# Patient Record
Sex: FEMALE | Race: BLACK OR AFRICAN AMERICAN | NOT HISPANIC OR LATINO
[De-identification: names, ages, dates, MRNs, and addresses within clinical notes are randomized per-mention and may not be internally consistent; named-entity substitution may affect disease eponyms.]

---

## 2017-01-06 ENCOUNTER — APPOINTMENT (OUTPATIENT)
Dept: GASTROENTEROLOGY | Facility: CLINIC | Age: 57
End: 2017-01-06

## 2017-07-10 ENCOUNTER — APPOINTMENT (OUTPATIENT)
Dept: VASCULAR SURGERY | Facility: CLINIC | Age: 57
End: 2017-07-10

## 2017-07-10 VITALS
HEIGHT: 64 IN | WEIGHT: 281 LBS | DIASTOLIC BLOOD PRESSURE: 87 MMHG | HEART RATE: 75 BPM | TEMPERATURE: 98 F | BODY MASS INDEX: 47.97 KG/M2 | SYSTOLIC BLOOD PRESSURE: 140 MMHG

## 2017-07-10 DIAGNOSIS — I10 ESSENTIAL (PRIMARY) HYPERTENSION: ICD-10-CM

## 2017-07-10 DIAGNOSIS — Z87.891 PERSONAL HISTORY OF NICOTINE DEPENDENCE: ICD-10-CM

## 2017-07-10 DIAGNOSIS — Z87.09 PERSONAL HISTORY OF OTHER DISEASES OF THE RESPIRATORY SYSTEM: ICD-10-CM

## 2017-07-10 DIAGNOSIS — Z86.39 PERSONAL HISTORY OF OTHER ENDOCRINE, NUTRITIONAL AND METABOLIC DISEASE: ICD-10-CM

## 2017-07-10 DIAGNOSIS — I83.93 ASYMPTOMATIC VARICOSE VEINS OF BILATERAL LOWER EXTREMITIES: ICD-10-CM

## 2017-07-10 DIAGNOSIS — Z78.9 OTHER SPECIFIED HEALTH STATUS: ICD-10-CM

## 2017-07-10 RX ORDER — SIMVASTATIN 40 MG/1
40 TABLET, FILM COATED ORAL
Refills: 0 | Status: ACTIVE | COMMUNITY

## 2017-07-10 RX ORDER — MONTELUKAST 10 MG/1
10 TABLET, FILM COATED ORAL
Refills: 0 | Status: ACTIVE | COMMUNITY

## 2017-07-10 RX ORDER — TELMISARTAN AND HYDROCHLOROTHIAZIDE 40; 12.5 MG/1; MG/1
40-12.5 TABLET ORAL
Refills: 0 | Status: ACTIVE | COMMUNITY

## 2017-10-23 ENCOUNTER — APPOINTMENT (OUTPATIENT)
Dept: VASCULAR SURGERY | Facility: CLINIC | Age: 57
End: 2017-10-23
Payer: COMMERCIAL

## 2017-10-23 VITALS
TEMPERATURE: 98 F | DIASTOLIC BLOOD PRESSURE: 84 MMHG | SYSTOLIC BLOOD PRESSURE: 137 MMHG | HEIGHT: 64 IN | BODY MASS INDEX: 47.46 KG/M2 | WEIGHT: 278 LBS

## 2017-10-23 PROCEDURE — 99214 OFFICE O/P EST MOD 30 MIN: CPT

## 2017-11-01 ENCOUNTER — APPOINTMENT (OUTPATIENT)
Dept: VASCULAR SURGERY | Facility: CLINIC | Age: 57
End: 2017-11-01
Payer: COMMERCIAL

## 2017-11-01 PROCEDURE — 99211 OFF/OP EST MAY X REQ PHY/QHP: CPT

## 2017-11-01 PROCEDURE — 93924 LWR XTR VASC STDY BILAT: CPT

## 2018-02-05 ENCOUNTER — APPOINTMENT (OUTPATIENT)
Dept: VASCULAR SURGERY | Facility: CLINIC | Age: 58
End: 2018-02-05

## 2018-03-15 ENCOUNTER — RX RENEWAL (OUTPATIENT)
Age: 58
End: 2018-03-15

## 2018-06-28 ENCOUNTER — EMERGENCY (EMERGENCY)
Facility: HOSPITAL | Age: 58
LOS: 1 days | Discharge: HOME | End: 2018-06-28
Attending: EMERGENCY MEDICINE | Admitting: EMERGENCY MEDICINE
Payer: COMMERCIAL

## 2018-06-28 VITALS
TEMPERATURE: 98 F | SYSTOLIC BLOOD PRESSURE: 144 MMHG | OXYGEN SATURATION: 100 % | HEART RATE: 87 BPM | DIASTOLIC BLOOD PRESSURE: 90 MMHG | RESPIRATION RATE: 16 BRPM

## 2018-06-28 LAB
APPEARANCE UR: CLEAR — SIGNIFICANT CHANGE UP
BILIRUB UR-MCNC: NEGATIVE — SIGNIFICANT CHANGE UP
BLOOD UR QL VISUAL: NEGATIVE — SIGNIFICANT CHANGE UP
COLOR SPEC: YELLOW — SIGNIFICANT CHANGE UP
GLUCOSE UR-MCNC: NEGATIVE — SIGNIFICANT CHANGE UP
KETONES UR-MCNC: NEGATIVE — SIGNIFICANT CHANGE UP
LEUKOCYTE ESTERASE UR-ACNC: HIGH
NITRITE UR-MCNC: NEGATIVE — SIGNIFICANT CHANGE UP
NON-SQ EPI CELLS # UR AUTO: 1 — SIGNIFICANT CHANGE UP
PH UR: 6 — SIGNIFICANT CHANGE UP (ref 4.6–8)
PROT UR-MCNC: NEGATIVE MG/DL — SIGNIFICANT CHANGE UP
RBC CASTS # UR COMP ASSIST: SIGNIFICANT CHANGE UP (ref 0–?)
SP GR SPEC: 1.02 — SIGNIFICANT CHANGE UP (ref 1–1.04)
SQUAMOUS # UR AUTO: SIGNIFICANT CHANGE UP
UROBILINOGEN FLD QL: NORMAL MG/DL — SIGNIFICANT CHANGE UP
WBC UR QL: SIGNIFICANT CHANGE UP (ref 0–?)

## 2018-06-28 PROCEDURE — 99284 EMERGENCY DEPT VISIT MOD MDM: CPT

## 2018-06-28 PROCEDURE — 76830 TRANSVAGINAL US NON-OB: CPT | Mod: 26

## 2018-06-28 RX ORDER — DICLOFENAC SODIUM 75 MG/1
1 TABLET, DELAYED RELEASE ORAL
Qty: 30 | Refills: 0
Start: 2018-06-28 | End: 2018-07-07

## 2018-06-28 RX ORDER — IBUPROFEN 200 MG
600 TABLET ORAL ONCE
Qty: 0 | Refills: 0 | Status: COMPLETED | OUTPATIENT
Start: 2018-06-28 | End: 2018-06-28

## 2018-06-28 RX ADMIN — Medication 600 MILLIGRAM(S): at 16:14

## 2018-06-28 NOTE — ED PROVIDER NOTE - PLAN OF CARE
- Follow up with your doctor/OBGYN within 3-5 days.   - Return to the ED for any new or worsening symptoms.   - Take Tylenol (Acetaminophen) 650mg or Motrin (Ibuprofen/Advil) 600mg every 6 hours as needed for pain.   - Return to the ED for any new or worsening symptoms.

## 2018-06-28 NOTE — ED PROVIDER NOTE - ATTENDING CONTRIBUTION TO CARE
Seen and examined, mild distress, c/o chronic low abd/pelvic pain with recent inc. pain and inc. urinary c/o, +dysuria, no fever/chills, no N/V, no freq. Pt. states has GYN but not helping pelvic pains, denies recent trauma or injury, denies recent illness or inciting event. Clear lungs, heart reg, abd soft, obese, mild low abd tenderness, no ba/guarding, no CVAT.

## 2018-06-28 NOTE — ED PROVIDER NOTE - OBJECTIVE STATEMENT
57 yo morbidly obese female h/o HTN, HLD, DM p/w suprapubic and L pelvic pain unchanged x 2 months, endorses occasional vaginal burning. Increased urination. Went to OB last week, had normal exam and US. Had enlarged ovarian cyst requiring surgery in the past. Denies STDs, vaginal discharge, vaginal bleeding, dysuria, hematuria, nausea, vomiting, flank pain.

## 2018-06-28 NOTE — ED PROVIDER NOTE - CARE PLAN
Principal Discharge DX:	Fibroids  Assessment and plan of treatment:	- Follow up with your doctor/OBGYN within 3-5 days.   - Return to the ED for any new or worsening symptoms.   - Take Tylenol (Acetaminophen) 650mg or Motrin (Ibuprofen/Advil) 600mg every 6 hours as needed for pain.   - Return to the ED for any new or worsening symptoms.

## 2018-06-28 NOTE — ED ADULT TRIAGE NOTE - CHIEF COMPLAINT QUOTE
pt c/o pelvic pain and burning x few months but states pain is getting worse. c/o frequent urination. denies dysuria/fever/chills. pt was seen by OBGYN with negative work up. denies abnormal vaginal discharge/odor.

## 2018-06-30 LAB
BACTERIA UR CULT: SIGNIFICANT CHANGE UP
SPECIMEN SOURCE: SIGNIFICANT CHANGE UP

## 2019-11-04 ENCOUNTER — APPOINTMENT (OUTPATIENT)
Dept: VASCULAR SURGERY | Facility: CLINIC | Age: 59
End: 2019-11-04
Payer: COMMERCIAL

## 2019-11-04 VITALS
WEIGHT: 278 LBS | DIASTOLIC BLOOD PRESSURE: 69 MMHG | BODY MASS INDEX: 47.46 KG/M2 | HEART RATE: 71 BPM | HEIGHT: 64 IN | SYSTOLIC BLOOD PRESSURE: 120 MMHG

## 2019-11-04 PROCEDURE — 99213 OFFICE O/P EST LOW 20 MIN: CPT

## 2019-11-04 PROCEDURE — 93970 EXTREMITY STUDY: CPT

## 2019-11-04 NOTE — HISTORY OF PRESENT ILLNESS
[FreeTextEntry1] : 58 yo female with history of dm, htn and venous insufficency with edema and pain bilaterally presents for follow up with recurrent lower extremity tenderness.  pt states that the pain is mainly at night.  pt denies any history of claudications.  pt states that she occasionally wears the compression stockings but sees no difference in her symptoms.  pt unaware of any provoking symptoms.  \par pt states that the lower extremities burn and are tender to touch

## 2019-11-04 NOTE — ASSESSMENT
[FreeTextEntry1] : 58 yo female with history of dm, htn and venous insufficency with edema and pain bilaterally presents for follow up with recurrent lower extremity tenderness\par venous duplex shows no evidence of dvt or venous insufficency \par at this time no intervention at this time \par pt to follow up as needed

## 2019-11-04 NOTE — PHYSICAL EXAM
[2+] : left 2+ [No Rash or Lesion] : No rash or lesion [Calm] : calm [JVD] : no jugular venous distention  [Normal Breath Sounds] : Normal breath sounds [Normal Heart Sounds] : normal heart sounds [Ankle Swelling (On Exam)] : not present [Varicose Veins Of Lower Extremities] : not present [] : not present [Abdomen Masses] : No abdominal masses [Skin Ulcer] : no ulcer [Oriented to Person] : oriented to person [Oriented to Place] : oriented to place [de-identified] : appears well

## 2020-02-18 ENCOUNTER — APPOINTMENT (OUTPATIENT)
Dept: GASTROENTEROLOGY | Facility: CLINIC | Age: 60
End: 2020-02-18
Payer: COMMERCIAL

## 2020-02-18 VITALS
HEIGHT: 64 IN | SYSTOLIC BLOOD PRESSURE: 111 MMHG | HEART RATE: 60 BPM | DIASTOLIC BLOOD PRESSURE: 66 MMHG | WEIGHT: 274 LBS | BODY MASS INDEX: 46.78 KG/M2

## 2020-02-18 DIAGNOSIS — Z80.0 FAMILY HISTORY OF MALIGNANT NEOPLASM OF DIGESTIVE ORGANS: ICD-10-CM

## 2020-02-18 DIAGNOSIS — E78.5 HYPERLIPIDEMIA, UNSPECIFIED: ICD-10-CM

## 2020-02-18 DIAGNOSIS — R19.4 CHANGE IN BOWEL HABIT: ICD-10-CM

## 2020-02-18 DIAGNOSIS — K57.30 DIVERTICULOSIS OF LARGE INTESTINE W/OUT PERFORATION OR ABSCESS W/OUT BLEEDING: ICD-10-CM

## 2020-02-18 DIAGNOSIS — Z83.79 FAMILY HISTORY OF OTHER DISEASES OF THE DIGESTIVE SYSTEM: ICD-10-CM

## 2020-02-18 DIAGNOSIS — R19.7 DIARRHEA, UNSPECIFIED: ICD-10-CM

## 2020-02-18 DIAGNOSIS — E66.01 MORBID (SEVERE) OBESITY DUE TO EXCESS CALORIES: ICD-10-CM

## 2020-02-18 DIAGNOSIS — K44.9 DIAPHRAGMATIC HERNIA W/OUT OBSTRUCTION OR GANGRENE: ICD-10-CM

## 2020-02-18 DIAGNOSIS — Z80.42 FAMILY HISTORY OF MALIGNANT NEOPLASM OF PROSTATE: ICD-10-CM

## 2020-02-18 PROCEDURE — 82274 ASSAY TEST FOR BLOOD FECAL: CPT | Mod: QW

## 2020-02-18 PROCEDURE — 99243 OFF/OP CNSLTJ NEW/EST LOW 30: CPT

## 2020-02-18 RX ORDER — ALBUTEROL SULFATE 90 UG/1
108 (90 BASE) AEROSOL, METERED RESPIRATORY (INHALATION)
Qty: 17 | Refills: 0 | Status: ACTIVE | COMMUNITY
Start: 2019-11-11

## 2020-02-18 RX ORDER — OMEPRAZOLE 40 MG/1
40 CAPSULE, DELAYED RELEASE ORAL
Qty: 30 | Refills: 0 | Status: DISCONTINUED | COMMUNITY
Start: 2018-03-15 | End: 2020-02-18

## 2020-02-18 RX ORDER — DICLOFENAC SODIUM 75 MG/1
75 TABLET, DELAYED RELEASE ORAL
Qty: 120 | Refills: 0 | Status: ACTIVE | COMMUNITY
Start: 2019-12-09

## 2020-02-18 NOTE — CONSULT LETTER
[Dear  ___] : Dear  [unfilled], [Consult Letter:] : I had the pleasure of evaluating your patient, [unfilled]. [Please see my note below.] : Please see my note below. [Consult Closing:] : Thank you very much for allowing me to participate in the care of this patient.  If you have any questions, please do not hesitate to contact me. [Sincerely,] : Sincerely, [FreeTextEntry3] : Aguila Grimaldo M.D.\par

## 2020-02-18 NOTE — PHYSICAL EXAM
[General Appearance - Alert] : alert [General Appearance - In No Acute Distress] : in no acute distress [General Appearance - Well Nourished] : well nourished [General Appearance - Well Developed] : well developed [Outer Ear] : the ears and nose were normal in appearance [Sclera] : the sclera and conjunctiva were normal [Oropharynx] : the oropharynx was normal [Neck Appearance] : the appearance of the neck was normal [Neck Cervical Mass (___cm)] : no neck mass was observed [Jugular Venous Distention Increased] : there was no jugular-venous distention [Thyroid Nodule] : there were no palpable thyroid nodules [Thyroid Diffuse Enlargement] : the thyroid was not enlarged [Heart Sounds] : normal S1 and S2 [Auscultation Breath Sounds / Voice Sounds] : lungs were clear to auscultation bilaterally [Heart Rate And Rhythm] : heart rate was normal and rhythm regular [Heart Sounds Gallop] : no gallops [Murmurs] : no murmurs [Heart Sounds Pericardial Friction Rub] : no pericardial rub [Full Pulse] : the pedal pulses are present [Edema] : there was no peripheral edema [Bowel Sounds] : normal bowel sounds [Abdomen Soft] : soft [Abdomen Tenderness] : non-tender [Normal Sphincter Tone] : normal sphincter tone [Abdomen Mass (___ Cm)] : no abdominal mass palpated [No Rectal Mass] : no rectal mass [Internal Hemorrhoid] : internal hemorrhoids [Cervical Lymph Nodes Enlarged Posterior Bilaterally] : posterior cervical [Cervical Lymph Nodes Enlarged Anterior Bilaterally] : anterior cervical [Supraclavicular Lymph Nodes Enlarged Bilaterally] : supraclavicular [Axillary Lymph Nodes Enlarged Bilaterally] : axillary [Femoral Lymph Nodes Enlarged Bilaterally] : femoral [Inguinal Lymph Nodes Enlarged Bilaterally] : inguinal [No CVA Tenderness] : no ~M costovertebral angle tenderness [No Spinal Tenderness] : no spinal tenderness [Nail Clubbing] : no clubbing  or cyanosis of the fingernails [Musculoskeletal - Swelling] : no joint swelling seen [Skin Turgor] : normal skin turgor [Skin Color & Pigmentation] : normal skin color and pigmentation [Impaired Insight] : insight and judgment were intact [Oriented To Time, Place, And Person] : oriented to person, place, and time [] : no rash [Affect] : the affect was normal [External Hemorrhoid] : no external hemorrhoids [FreeTextEntry1] : FIT negative [Occult Blood Positive] : stool was negative for occult blood

## 2020-02-18 NOTE — HISTORY OF PRESENT ILLNESS
[FreeTextEntry1] : Over the past few months, Lavonne has noted intermittent change in bowels, with intermittent loose stools, fecal urgency, gassiness and lower abdominal discomfort. She has been taking metformin daily for several years. She also reports occasional heartburn and sensation of "ball in the chest", off omeprazole. She tried Pepto-Bismol, but she became constipated. Her brother  of colon cancer at age 61. Last colonoscopy 2016 revealed multiple hyperplastic polyps, left-sided diverticulosis, and hemorrhoids. Last EGD 2016 revealed mild inflammation, hiatal hernia with patulous Schatzki ring.

## 2020-02-18 NOTE — REVIEW OF SYSTEMS
[Recent Weight Loss (___ Lbs)] : recent [unfilled] ~Ulb weight loss [Leg Claudication] : intermittent leg claudication [Diarrhea] : diarrhea [Heartburn] : heartburn [Negative] : Heme/Lymph [As Noted in HPI] : as noted in HPI

## 2020-02-18 NOTE — ASSESSMENT
[FreeTextEntry1] : 1. Recent change in bowels with loose stools, lower abdominal pain, gassiness, urgency; family history of colon cancer (brother); polyps, left-sided diverticulosis, hemorrhoids at last colonoscopy December 2016--possible microscopic colitis, diverticulosis with spasm, IBD, celiac disease, bacterial overgrowth, pancreatic insufficiency, GI neoplasia.\par 2. GERD, episodic dysphagia; mild esophagitis, hiatal hernia with patulous Schatzki ring at EGD December 2016.\par 3. Morbid obesity.\par 4. Type 2 diabetes mellitus, suboptimally controlled.\par 5. Ex-smoker.\par 6. Hypertension.\par 7. Hyperlipidemia.\par 8. DJD of spine.\par 9. Status post myomectomy, ovarian cystectomy.\par \par Plan:\par 1. Extensive labs, including ESR + C-reactive protein + TFTs, + celiac panel,  to be drawn by Dr. Horn at annual physical later this week.\par 2. Dr. Horn to also forward last labs for my review.\par 3. Samples of Xifaxan 550 mg b.i.d. with meals given, for 5-7 days.\par 4. Resume omeprazole 40 mg daily.\par 5. Schedule repeat panendoscopy with biopsies--however, she is not clearable for office anesthesia, given BMI>40. As I do no hospital work, Dr. Jani Ty could perform at the hospital on my behalf. Procedures, rationale, alternatives, material risks, anesthesia plan, PEG prep instructions were reviewed and brochures given. She is aware of the need to hold DM meds on the morning of the tests. \par 6. Other recommendations to follow.

## 2020-03-16 ENCOUNTER — APPOINTMENT (OUTPATIENT)
Dept: GASTROENTEROLOGY | Facility: HOSPITAL | Age: 60
End: 2020-03-16

## 2020-07-20 ENCOUNTER — APPOINTMENT (OUTPATIENT)
Dept: GASTROENTEROLOGY | Facility: HOSPITAL | Age: 60
End: 2020-07-20

## 2020-08-03 ENCOUNTER — EMERGENCY (EMERGENCY)
Facility: HOSPITAL | Age: 60
LOS: 1 days | Discharge: HOME | End: 2020-08-03
Attending: EMERGENCY MEDICINE | Admitting: EMERGENCY MEDICINE
Payer: COMMERCIAL

## 2020-08-03 VITALS
RESPIRATION RATE: 16 BRPM | SYSTOLIC BLOOD PRESSURE: 111 MMHG | DIASTOLIC BLOOD PRESSURE: 63 MMHG | OXYGEN SATURATION: 100 % | HEART RATE: 87 BPM | WEIGHT: 279.99 LBS | HEIGHT: 64 IN | TEMPERATURE: 98 F

## 2020-08-03 DIAGNOSIS — D25.9 LEIOMYOMA OF UTERUS, UNSPECIFIED: Chronic | ICD-10-CM

## 2020-08-03 LAB
ALBUMIN SERPL ELPH-MCNC: 4.6 G/DL — SIGNIFICANT CHANGE UP (ref 3.3–5)
ALP SERPL-CCNC: 96 U/L — SIGNIFICANT CHANGE UP (ref 40–120)
ALT FLD-CCNC: 31 U/L — SIGNIFICANT CHANGE UP (ref 4–33)
ANION GAP SERPL CALC-SCNC: 16 MMO/L — HIGH (ref 7–14)
AST SERPL-CCNC: 20 U/L — SIGNIFICANT CHANGE UP (ref 4–32)
BASOPHILS # BLD AUTO: 0.04 K/UL — SIGNIFICANT CHANGE UP (ref 0–0.2)
BASOPHILS NFR BLD AUTO: 0.5 % — SIGNIFICANT CHANGE UP (ref 0–2)
BILIRUB SERPL-MCNC: 0.9 MG/DL — SIGNIFICANT CHANGE UP (ref 0.2–1.2)
BUN SERPL-MCNC: 31 MG/DL — HIGH (ref 7–23)
CALCIUM SERPL-MCNC: 10 MG/DL — SIGNIFICANT CHANGE UP (ref 8.4–10.5)
CHLORIDE SERPL-SCNC: 99 MMOL/L — SIGNIFICANT CHANGE UP (ref 98–107)
CO2 SERPL-SCNC: 22 MMOL/L — SIGNIFICANT CHANGE UP (ref 22–31)
CREAT SERPL-MCNC: 1.07 MG/DL — SIGNIFICANT CHANGE UP (ref 0.5–1.3)
EOSINOPHIL # BLD AUTO: 0.1 K/UL — SIGNIFICANT CHANGE UP (ref 0–0.5)
EOSINOPHIL NFR BLD AUTO: 1.3 % — SIGNIFICANT CHANGE UP (ref 0–6)
GLUCOSE SERPL-MCNC: 104 MG/DL — HIGH (ref 70–99)
HCT VFR BLD CALC: 39.4 % — SIGNIFICANT CHANGE UP (ref 34.5–45)
HGB BLD-MCNC: 12.1 G/DL — SIGNIFICANT CHANGE UP (ref 11.5–15.5)
IMM GRANULOCYTES NFR BLD AUTO: 0.3 % — SIGNIFICANT CHANGE UP (ref 0–1.5)
LYMPHOCYTES # BLD AUTO: 2.5 K/UL — SIGNIFICANT CHANGE UP (ref 1–3.3)
LYMPHOCYTES # BLD AUTO: 33.4 % — SIGNIFICANT CHANGE UP (ref 13–44)
MCHC RBC-ENTMCNC: 27.1 PG — SIGNIFICANT CHANGE UP (ref 27–34)
MCHC RBC-ENTMCNC: 30.7 % — LOW (ref 32–36)
MCV RBC AUTO: 88.3 FL — SIGNIFICANT CHANGE UP (ref 80–100)
MONOCYTES # BLD AUTO: 0.57 K/UL — SIGNIFICANT CHANGE UP (ref 0–0.9)
MONOCYTES NFR BLD AUTO: 7.6 % — SIGNIFICANT CHANGE UP (ref 2–14)
NEUTROPHILS # BLD AUTO: 4.26 K/UL — SIGNIFICANT CHANGE UP (ref 1.8–7.4)
NEUTROPHILS NFR BLD AUTO: 56.9 % — SIGNIFICANT CHANGE UP (ref 43–77)
NRBC # FLD: 0 K/UL — SIGNIFICANT CHANGE UP (ref 0–0)
PLATELET # BLD AUTO: 254 K/UL — SIGNIFICANT CHANGE UP (ref 150–400)
PMV BLD: 11.1 FL — SIGNIFICANT CHANGE UP (ref 7–13)
POTASSIUM SERPL-MCNC: 4.4 MMOL/L — SIGNIFICANT CHANGE UP (ref 3.5–5.3)
POTASSIUM SERPL-SCNC: 4.4 MMOL/L — SIGNIFICANT CHANGE UP (ref 3.5–5.3)
PROT SERPL-MCNC: 8.1 G/DL — SIGNIFICANT CHANGE UP (ref 6–8.3)
RBC # BLD: 4.46 M/UL — SIGNIFICANT CHANGE UP (ref 3.8–5.2)
RBC # FLD: 14.5 % — SIGNIFICANT CHANGE UP (ref 10.3–14.5)
SODIUM SERPL-SCNC: 137 MMOL/L — SIGNIFICANT CHANGE UP (ref 135–145)
WBC # BLD: 7.49 K/UL — SIGNIFICANT CHANGE UP (ref 3.8–10.5)
WBC # FLD AUTO: 7.49 K/UL — SIGNIFICANT CHANGE UP (ref 3.8–10.5)

## 2020-08-03 PROCEDURE — 99283 EMERGENCY DEPT VISIT LOW MDM: CPT

## 2020-08-03 RX ORDER — KETOROLAC TROMETHAMINE 30 MG/ML
30 SYRINGE (ML) INJECTION ONCE
Refills: 0 | Status: DISCONTINUED | OUTPATIENT
Start: 2020-08-03 | End: 2020-08-03

## 2020-08-03 RX ORDER — IBUPROFEN 200 MG
600 TABLET ORAL ONCE
Refills: 0 | Status: COMPLETED | OUTPATIENT
Start: 2020-08-03 | End: 2020-08-03

## 2020-08-03 RX ADMIN — Medication 600 MILLIGRAM(S): at 15:40

## 2020-08-03 NOTE — ED PROVIDER NOTE - CARE PLAN
Principal Discharge DX:	Vaginal bleeding  Secondary Diagnosis:	Uterine leiomyoma, unspecified location  Secondary Diagnosis:	Pelvic pain in female

## 2020-08-03 NOTE — ED PROVIDER NOTE - ATTENDING CONTRIBUTION TO CARE
I have seen and examined the patient on the patient´s visit date. I have reviewed the note written by [facility], on that visit day. I have supervised and participated as necessary in the performance of procedures indicated for patient management and was available at all phases of the patient´s visit when needed. We discussed the history, physical exam findings, mnagement plan, and  medical decision making. I have made my additons, exceptions, and revisions within the chart and I agree with H and P as documented in its entirety. The data and my interpretation of any data collected from labs, interventions and imaging appear below as well as my independent medical decision making and considerations    The patient is a 60y Female who has a past medical and surgery history of Diabetes HLD (hyperlipidemia)  HTN (hypertension) Asthma Fibroid uterus PTED with vag spotting and crampy pelvic pain as described  Vital Signs Last 24 Hrs  T(C): 36.8 (03 Aug 2020 12:58), Max: 36.8 (03 Aug 2020 12:58)  T(F): 98.3 (03 Aug 2020 12:58), Max: 98.3 (03 Aug 2020 12:58)  HR: 87 (03 Aug 2020 12:58) (87 - 87)  BP: 111/63 (03 Aug 2020 12:58) (111/63 - 111/63)  BP(mean): --  RR: 16 (03 Aug 2020 12:58) (16 - 16)  SpO2: 100% (03 Aug 2020 12:58) (100% - 100%)  PE: as described; my additions and exceptions are noted in the chart  DATA:   Lab Results:                        12.1   7.49  )-----------( 254      ( 03 Aug 2020 14:00 )             39.4   Mean Cell Volume: 88.3 fL (08-03-20 @ 14:00) Auto Neutrophil %: 56.9 % (08-03-20 @ 14:00) Auto Eosinophil %: 1.3 % (08-03-20 @ 14:00)    137  |  99  |  31<H>  ----------------------------<  104<H>  4.4   |  22  |  1.07    Ca    10.0      03 Aug 2020 14:00    TPro  8.1  /  Alb  4.6  /  TBili  0.9  /  DBili  x   /  AST  20  /  ALT  31  /  AlkPhos  96  08-03    MDM: Vaginal bleeding stable H/H probably from fibroids but last endometrial biopsy 2014; needs new one  IMP (IRISK): Low risk for massive hemorrhage > risk is for occult malignancy    Management (Plan):  Need for outpt followup emphasized with patient  toradol given for pain can substitute other NSAID for outpt  F/u with Gyn  RTED PRN

## 2020-08-03 NOTE — ED PROVIDER NOTE - PATIENT PORTAL LINK FT
You can access the FollowMyHealth Patient Portal offered by [facility] by registering at the following website: [email or web address]. By joining [employer]’s FollowMyHealth portal, you will also be able to view your health information using other applications (apps) compatible with our system.

## 2020-08-03 NOTE — ED PROVIDER NOTE - CLINICAL SUMMARY MEDICAL DECISION MAKING FREE TEXT BOX
61 y/o female with pmhx of obesity, DM, HTN, HLD, presents to ED c/o vaginal spotting x few days. LMP 2/2012. States had a negative biopsy in 2014 due to an episode of spotting. States its very light, using about 1-2 panty liners per day. Associated with mild pelvic cramping. pt hemodynamically stable. post menopausal bleeding- plan to check labs, no active bleeding on exam. discussed close OBGYN follow up for imaging and likely biopsy. pt amenable w/ plan.

## 2020-08-03 NOTE — ED PROVIDER NOTE - OBJECTIVE STATEMENT
61 y/o female with pmhx of obesity, DM, HTN, HLD, presents to ED c/o vaginal spotting x few days. LMP 2/2012. States had a negative biopsy in 2014 due to an episode of spotting. States its very light, using about 1-2 panty liners per day. Associated with mild pelvic cramping. No fever, chills, cp, sob, n/v/d, vaginal discharge, dysuria, hematuria, back pain. Not sexually active.

## 2020-08-03 NOTE — ED PROVIDER NOTE - NSFOLLOWUPINSTRUCTIONS_ED_ALL_ED_FT
Follow up with your OBGYN within 48 hours  Take copy of results with you    Worsening, continued or new concerning symptoms return to the emergency department.

## 2020-08-03 NOTE — ED ADULT NURSE REASSESSMENT NOTE - NS ED NURSE REASSESS COMMENT FT1
pt resting in results waiting. Pt alert and oriented x4. c.o  9/10 abdominal pain, escalated to md for pain medication. pt waiting for lab results

## 2020-09-17 ENCOUNTER — TRANSCRIPTION ENCOUNTER (OUTPATIENT)
Age: 60
End: 2020-09-17

## 2020-10-22 ENCOUNTER — TRANSCRIPTION ENCOUNTER (OUTPATIENT)
Age: 60
End: 2020-10-22

## 2020-12-14 ENCOUNTER — TRANSCRIPTION ENCOUNTER (OUTPATIENT)
Age: 60
End: 2020-12-14

## 2021-04-19 ENCOUNTER — EMERGENCY (EMERGENCY)
Facility: HOSPITAL | Age: 61
LOS: 1 days | Discharge: HOME | End: 2021-04-19
Attending: EMERGENCY MEDICINE | Admitting: EMERGENCY MEDICINE
Payer: COMMERCIAL

## 2021-04-19 VITALS
HEART RATE: 116 BPM | DIASTOLIC BLOOD PRESSURE: 95 MMHG | TEMPERATURE: 99 F | HEIGHT: 64 IN | OXYGEN SATURATION: 100 % | SYSTOLIC BLOOD PRESSURE: 138 MMHG | RESPIRATION RATE: 22 BRPM

## 2021-04-19 DIAGNOSIS — D25.9 LEIOMYOMA OF UTERUS, UNSPECIFIED: Chronic | ICD-10-CM

## 2021-04-19 PROCEDURE — 73630 X-RAY EXAM OF FOOT: CPT | Mod: 26,LT

## 2021-04-19 RX ORDER — INDOMETHACIN 50 MG
50 CAPSULE ORAL ONCE
Refills: 0 | Status: COMPLETED | OUTPATIENT
Start: 2021-04-19 | End: 2021-04-19

## 2021-04-19 RX ORDER — COLCHICINE 0.6 MG
1.2 TABLET ORAL ONCE
Refills: 0 | Status: COMPLETED | OUTPATIENT
Start: 2021-04-19 | End: 2021-04-19

## 2021-04-19 RX ORDER — OXYCODONE AND ACETAMINOPHEN 5; 325 MG/1; MG/1
1 TABLET ORAL ONCE
Refills: 0 | Status: DISCONTINUED | OUTPATIENT
Start: 2021-04-19 | End: 2021-04-19

## 2021-04-19 RX ORDER — MORPHINE SULFATE 50 MG/1
4 CAPSULE, EXTENDED RELEASE ORAL ONCE
Refills: 0 | Status: DISCONTINUED | OUTPATIENT
Start: 2021-04-19 | End: 2021-04-19

## 2021-04-19 RX ADMIN — Medication 1.2 MILLIGRAM(S): at 22:00

## 2021-04-19 RX ADMIN — Medication 50 MILLIGRAM(S): at 22:00

## 2021-04-19 RX ADMIN — MORPHINE SULFATE 4 MILLIGRAM(S): 50 CAPSULE, EXTENDED RELEASE ORAL at 23:09

## 2021-04-19 RX ADMIN — OXYCODONE AND ACETAMINOPHEN 1 TABLET(S): 5; 325 TABLET ORAL at 22:00

## 2021-04-19 NOTE — ED ADULT TRIAGE NOTE - CHIEF COMPLAINT QUOTE
pt c/o increased throbbing left great toe pain since yesterday with no relief with otc meds. pt teary in triage grimacing when walking, sob.  PMH: DM, HTN, asthma, high cholesterol, acid reflux pt c/o increased throbbing left great toe pain since yesterday with no relief with otc meds. pt teary in triage grimacing & difficulty walking, sob.  PMH: DM, HTN, asthma, high cholesterol, acid reflux fs- 121

## 2021-04-19 NOTE — ED ADULT NURSE NOTE - OBJECTIVE STATEMENT
patient aaox3. ambulatory. came in with left foot pain. patient appears in acute distress. pain 10/10. pain started yesterday spontaneously. no known hx of gout. hx of asthma, hbp, dm, hld. tried to take pain meds without any relief. denies numbness or tingling to feet. skin color intact. skin intact. warm to touch. medicated as ordered. Will continue to monitor

## 2021-04-19 NOTE — ED ADULT NURSE NOTE - CHIEF COMPLAINT QUOTE
pt c/o increased throbbing left great toe pain since yesterday with no relief with otc meds. pt teary in triage grimacing & difficulty walking, sob.  PMH: DM, HTN, asthma, high cholesterol, acid reflux fs- 121

## 2021-04-20 VITALS
DIASTOLIC BLOOD PRESSURE: 66 MMHG | TEMPERATURE: 98 F | OXYGEN SATURATION: 100 % | RESPIRATION RATE: 18 BRPM | SYSTOLIC BLOOD PRESSURE: 130 MMHG | HEART RATE: 71 BPM

## 2021-04-20 LAB
ALBUMIN SERPL ELPH-MCNC: 4.6 G/DL — SIGNIFICANT CHANGE UP (ref 3.3–5)
ALP SERPL-CCNC: 118 U/L — SIGNIFICANT CHANGE UP (ref 40–120)
ALT FLD-CCNC: 14 U/L — SIGNIFICANT CHANGE UP (ref 4–33)
ANION GAP SERPL CALC-SCNC: 10 MMOL/L — SIGNIFICANT CHANGE UP (ref 7–14)
AST SERPL-CCNC: 15 U/L — SIGNIFICANT CHANGE UP (ref 4–32)
BASOPHILS # BLD AUTO: 0.02 K/UL — SIGNIFICANT CHANGE UP (ref 0–0.2)
BASOPHILS NFR BLD AUTO: 0.2 % — SIGNIFICANT CHANGE UP (ref 0–2)
BILIRUB SERPL-MCNC: 0.5 MG/DL — SIGNIFICANT CHANGE UP (ref 0.2–1.2)
BUN SERPL-MCNC: 20 MG/DL — SIGNIFICANT CHANGE UP (ref 7–23)
CALCIUM SERPL-MCNC: 10 MG/DL — SIGNIFICANT CHANGE UP (ref 8.4–10.5)
CHLORIDE SERPL-SCNC: 102 MMOL/L — SIGNIFICANT CHANGE UP (ref 98–107)
CO2 SERPL-SCNC: 25 MMOL/L — SIGNIFICANT CHANGE UP (ref 22–31)
CREAT SERPL-MCNC: 1.18 MG/DL — SIGNIFICANT CHANGE UP (ref 0.5–1.3)
EOSINOPHIL # BLD AUTO: 0.04 K/UL — SIGNIFICANT CHANGE UP (ref 0–0.5)
EOSINOPHIL NFR BLD AUTO: 0.5 % — SIGNIFICANT CHANGE UP (ref 0–6)
GLUCOSE SERPL-MCNC: 164 MG/DL — HIGH (ref 70–99)
HCT VFR BLD CALC: 37.4 % — SIGNIFICANT CHANGE UP (ref 34.5–45)
HGB BLD-MCNC: 11.5 G/DL — SIGNIFICANT CHANGE UP (ref 11.5–15.5)
IANC: 6.03 K/UL — SIGNIFICANT CHANGE UP (ref 1.5–8.5)
IMM GRANULOCYTES NFR BLD AUTO: 0.1 % — SIGNIFICANT CHANGE UP (ref 0–1.5)
LYMPHOCYTES # BLD AUTO: 1.79 K/UL — SIGNIFICANT CHANGE UP (ref 1–3.3)
LYMPHOCYTES # BLD AUTO: 21.1 % — SIGNIFICANT CHANGE UP (ref 13–44)
MCHC RBC-ENTMCNC: 26.8 PG — LOW (ref 27–34)
MCHC RBC-ENTMCNC: 30.7 GM/DL — LOW (ref 32–36)
MCV RBC AUTO: 87.2 FL — SIGNIFICANT CHANGE UP (ref 80–100)
MONOCYTES # BLD AUTO: 0.59 K/UL — SIGNIFICANT CHANGE UP (ref 0–0.9)
MONOCYTES NFR BLD AUTO: 7 % — SIGNIFICANT CHANGE UP (ref 2–14)
NEUTROPHILS # BLD AUTO: 6.03 K/UL — SIGNIFICANT CHANGE UP (ref 1.8–7.4)
NEUTROPHILS NFR BLD AUTO: 71.1 % — SIGNIFICANT CHANGE UP (ref 43–77)
NRBC # BLD: 0 /100 WBCS — SIGNIFICANT CHANGE UP
NRBC # FLD: 0 K/UL — SIGNIFICANT CHANGE UP
PLATELET # BLD AUTO: 210 K/UL — SIGNIFICANT CHANGE UP (ref 150–400)
POTASSIUM SERPL-MCNC: 4.4 MMOL/L — SIGNIFICANT CHANGE UP (ref 3.5–5.3)
POTASSIUM SERPL-SCNC: 4.4 MMOL/L — SIGNIFICANT CHANGE UP (ref 3.5–5.3)
PROT SERPL-MCNC: 8.2 G/DL — SIGNIFICANT CHANGE UP (ref 6–8.3)
RBC # BLD: 4.29 M/UL — SIGNIFICANT CHANGE UP (ref 3.8–5.2)
RBC # FLD: 13.3 % — SIGNIFICANT CHANGE UP (ref 10.3–14.5)
SARS-COV-2 RNA SPEC QL NAA+PROBE: SIGNIFICANT CHANGE UP
SODIUM SERPL-SCNC: 137 MMOL/L — SIGNIFICANT CHANGE UP (ref 135–145)
URATE SERPL-MCNC: 9.3 MG/DL — HIGH (ref 2.5–7)
WBC # BLD: 8.48 K/UL — SIGNIFICANT CHANGE UP (ref 3.8–10.5)
WBC # FLD AUTO: 8.48 K/UL — SIGNIFICANT CHANGE UP (ref 3.8–10.5)

## 2021-04-20 PROCEDURE — 99236 HOSP IP/OBS SAME DATE HI 85: CPT

## 2021-04-20 RX ORDER — SODIUM CHLORIDE 9 MG/ML
1000 INJECTION, SOLUTION INTRAVENOUS
Refills: 0 | Status: DISCONTINUED | OUTPATIENT
Start: 2021-04-20 | End: 2021-04-23

## 2021-04-20 RX ORDER — OXYCODONE AND ACETAMINOPHEN 5; 325 MG/1; MG/1
1 TABLET ORAL EVERY 4 HOURS
Refills: 0 | Status: DISCONTINUED | OUTPATIENT
Start: 2021-04-20 | End: 2021-04-20

## 2021-04-20 RX ORDER — INSULIN LISPRO 100/ML
VIAL (ML) SUBCUTANEOUS AT BEDTIME
Refills: 0 | Status: DISCONTINUED | OUTPATIENT
Start: 2021-04-20 | End: 2021-04-23

## 2021-04-20 RX ORDER — DEXTROSE 50 % IN WATER 50 %
25 SYRINGE (ML) INTRAVENOUS ONCE
Refills: 0 | Status: DISCONTINUED | OUTPATIENT
Start: 2021-04-20 | End: 2021-04-23

## 2021-04-20 RX ORDER — COLCHICINE 0.6 MG
1 TABLET ORAL
Qty: 6 | Refills: 0
Start: 2021-04-20 | End: 2021-04-22

## 2021-04-20 RX ORDER — LOSARTAN POTASSIUM 100 MG/1
50 TABLET, FILM COATED ORAL DAILY
Refills: 0 | Status: DISCONTINUED | OUTPATIENT
Start: 2021-04-20 | End: 2021-04-23

## 2021-04-20 RX ORDER — DEXTROSE 50 % IN WATER 50 %
12.5 SYRINGE (ML) INTRAVENOUS ONCE
Refills: 0 | Status: DISCONTINUED | OUTPATIENT
Start: 2021-04-20 | End: 2021-04-23

## 2021-04-20 RX ORDER — PANTOPRAZOLE SODIUM 20 MG/1
40 TABLET, DELAYED RELEASE ORAL
Refills: 0 | Status: DISCONTINUED | OUTPATIENT
Start: 2021-04-20 | End: 2021-04-23

## 2021-04-20 RX ORDER — TELMISARTAN 20 MG/1
1 TABLET ORAL
Qty: 15 | Refills: 0
Start: 2021-04-20 | End: 2021-05-04

## 2021-04-20 RX ORDER — OXYCODONE AND ACETAMINOPHEN 5; 325 MG/1; MG/1
1 TABLET ORAL
Qty: 4 | Refills: 0
Start: 2021-04-20 | End: 2021-04-20

## 2021-04-20 RX ORDER — DEXTROSE 50 % IN WATER 50 %
15 SYRINGE (ML) INTRAVENOUS ONCE
Refills: 0 | Status: DISCONTINUED | OUTPATIENT
Start: 2021-04-20 | End: 2021-04-23

## 2021-04-20 RX ORDER — SIMVASTATIN 20 MG/1
40 TABLET, FILM COATED ORAL AT BEDTIME
Refills: 0 | Status: DISCONTINUED | OUTPATIENT
Start: 2021-04-20 | End: 2021-04-23

## 2021-04-20 RX ORDER — MORPHINE SULFATE 50 MG/1
4 CAPSULE, EXTENDED RELEASE ORAL ONCE
Refills: 0 | Status: DISCONTINUED | OUTPATIENT
Start: 2021-04-20 | End: 2021-04-20

## 2021-04-20 RX ORDER — KETOROLAC TROMETHAMINE 30 MG/ML
30 SYRINGE (ML) INJECTION EVERY 6 HOURS
Refills: 0 | Status: COMPLETED | OUTPATIENT
Start: 2021-04-20 | End: 2021-04-25

## 2021-04-20 RX ORDER — GLUCAGON INJECTION, SOLUTION 0.5 MG/.1ML
1 INJECTION, SOLUTION SUBCUTANEOUS ONCE
Refills: 0 | Status: DISCONTINUED | OUTPATIENT
Start: 2021-04-20 | End: 2021-04-23

## 2021-04-20 RX ORDER — INSULIN LISPRO 100/ML
VIAL (ML) SUBCUTANEOUS
Refills: 0 | Status: DISCONTINUED | OUTPATIENT
Start: 2021-04-20 | End: 2021-04-23

## 2021-04-20 RX ORDER — COLCHICINE 0.6 MG
0.6 TABLET ORAL ONCE
Refills: 0 | Status: COMPLETED | OUTPATIENT
Start: 2021-04-20 | End: 2021-04-20

## 2021-04-20 RX ADMIN — PANTOPRAZOLE SODIUM 40 MILLIGRAM(S): 20 TABLET, DELAYED RELEASE ORAL at 08:17

## 2021-04-20 RX ADMIN — MORPHINE SULFATE 4 MILLIGRAM(S): 50 CAPSULE, EXTENDED RELEASE ORAL at 01:40

## 2021-04-20 RX ADMIN — Medication 0.6 MILLIGRAM(S): at 10:14

## 2021-04-20 RX ADMIN — LOSARTAN POTASSIUM 50 MILLIGRAM(S): 100 TABLET, FILM COATED ORAL at 05:37

## 2021-04-20 RX ADMIN — Medication 30 MILLIGRAM(S): at 05:37

## 2021-04-20 NOTE — ED PROVIDER NOTE - ATTENDING CONTRIBUTION TO CARE
ED Attending (Dr Peterson): I have personally performed a face to face bedside history and physical examination of this patient.  I have discussed the case with the PA and  I have personally authored the following components: HPI, ROS, Physical Exam and MDM in addition to reviewing and revising the rest of the Provider Note. L great toe pain likely gout vs. pseudogout. No concern for cellulitis at this time. Atraumatic.  Will eval for fx.

## 2021-04-20 NOTE — ED CDU PROVIDER INITIAL DAY NOTE - ATTENDING CONTRIBUTION TO CARE
Atraumatic toe pain and tenderness, minimal swelling.  No fx on xr.  Gout vs. Pseudogout. Possible Thiazide induced gout. Provide sx tx and reassess.

## 2021-04-20 NOTE — ED PROVIDER NOTE - MUSCULOSKELETAL, MLM
L great toe TTP with mild edema. Minimal warmth. No erythema. L great toe TTP with mild edema. Minimal warmth. No erythema. Very tender.

## 2021-04-20 NOTE — ED CDU PROVIDER DISPOSITION NOTE - CLINICAL COURSE
59 y/o htn, dm, hld p/w atraumatic L great toe pain x today. pt states pain has been gradually worsening and now is a 10/10. Pain worse with palpation. Reports swelling to toe. No h/o gout. recently changed her diet.  While in ED pt received multiple rounds of analgesia with minimal relief of pain. uric acid noted to be elevated in serum. ddx includes gout vs pseudogout. Possible thiazide induced gout, given pt taking HCTZ. Will hold HCTZ at this time and plan to f/u with pcp for HTN med adjustment. Pain better controlled this morning. 61 y/o htn, dm, hld p/w atraumatic L great toe pain x today. pt states pain has been gradually worsening and now is a 10/10. Pain worse with palpation. Reports swelling to toe. No h/o gout. recently changed her diet.  While in ED pt received multiple rounds of analgesia with minimal relief of pain. uric acid noted to be elevated in serum. ddx includes gout vs pseudogout. Possible thiazide induced gout, given pt taking HCTZ. Will hold HCTZ at this time and plan to f/u with pcp for HTN med adjustment. Pain better controlled this morning. ambulating well in supportive walking shoe, with cane for alleviation of pressure on foot. Patient requests to have telmisartan 40mg sent to pharmacy without HCTZ until she follows with PCP.

## 2021-04-20 NOTE — ED CDU PROVIDER DISPOSITION NOTE - NSFOLLOWUPINSTRUCTIONS_ED_ALL_ED_FT
Follow up with your Primary Medical Doctor within 2-3days, discussion of adjustment of high blood pressure medications.     If results or reports were given to you, show copies of your reports given to you. Take all of your medications as previously prescribed. Follow up with your Primary Medical Doctor within 2-3days, discussion of adjustment of high blood pressure medications.     If results or reports were given to you, show copies of your reports given to you. Take all of your medications as previously prescribed.    Take Colchicine 0.6mg ( 1 tablet) every 12 hours for 3 days.    Percocet 1 tablets every 6 hrs as needed for breakthrough discomfort- caution drowsiness while taking this medication- do not drive or operate heavy machinery.    If any worsening severe pain or swelling, fevers, redness to the skin, or any other new or concerning symptoms return to the ER.

## 2021-04-20 NOTE — ED CDU PROVIDER INITIAL DAY NOTE - MUSCULOSKELETAL, MLM
Spine appears normal, range of motion is not limited, no muscle or joint tenderness Spine appears normal, range of motion is not limited, no muscle or joint tenderness except for great toe which is very tender

## 2021-04-20 NOTE — ED PROVIDER NOTE - PROGRESS NOTE DETAILS
Pt still in pain despite multiple rounds of analgesia. Refusing to walk 2/2 pain. Will keep in CDU for pain control.

## 2021-04-20 NOTE — ED PROVIDER NOTE - CLINICAL SUMMARY MEDICAL DECISION MAKING FREE TEXT BOX
L great toe pain likely gout vs. pseudogout. No concern for cellulitis at this time  plan  - labs  - pain control  - reassess L great toe pain likely gout vs. pseudogout. No concern for cellulitis at this time. Atraumatic.  Will eval for fx.   plan  - labs  - pain control  - reassess

## 2021-04-20 NOTE — ED CDU PROVIDER DISPOSITION NOTE - PATIENT PORTAL LINK FT
You can access the FollowMyHealth Patient Portal offered by [facility] by registering at the following website: [email or web address]. By joining [employer]’s FollowMyHealth portal, you will also be able to view your health information using other applications (apps) compatible with our system.

## 2021-04-20 NOTE — ED CDU PROVIDER INITIAL DAY NOTE - MEDICAL DECISION MAKING DETAILS
Gout vs. Pseudogout. Possible Thiazide induced gout  plan  - Hold HCTZ   - Pain control  - continue home meds  - Insulin SS Atraumatic toe pain and tenderness, minimal swelling.  No fx on xr.  Gout vs. Pseudogout. Possible Thiazide induced gout  plan  - Hold HCTZ   - Pain control  - continue home meds  - Insulin SS

## 2021-04-20 NOTE — ED CDU PROVIDER INITIAL DAY NOTE - PROGRESS NOTE DETAILS
This morning patient feeling much improved. pain is controlled. swelling improved and is able to move her toes with less pain.

## 2021-04-20 NOTE — ED CDU PROVIDER INITIAL DAY NOTE - OBJECTIVE STATEMENT
59 y/o htn, dm, hld p/w atraumatic L great toe pain x today. pt states pain has been gradually worsening and now is a 10/10. Pain worse with palpation. Reports swelling to toe. No h/o gout. recently changed her diet. Denies alcohol use. denies fever, chills, chest pain, sob, cough, abd pain, n/v/d, headache, dizziness.  While in ED pt received multiple rounds of analgesia with minimal relief of pain. uric acid noted to be elevated in serum. ddx includes gout vs pseudogout. Possible thiazide induced gout, given pt taking HCTZ. Will hold HCTZ at this time and plan to f/u with pcp for HTN med adjustment.

## 2021-04-20 NOTE — ED PROVIDER NOTE - OBJECTIVE STATEMENT
59 y/o htn, dm, hld p/w atraumatic L great toe pain x today. pt states pain has been gradually worsening and now is a 10/10. Pain worse with palpation. Reports swelling to toe. No h/o gout. recently changed her diet. Denies alcohol use. denies fever, chills, chest pain, sob, cough, abd pain, n/v/d, headache, dizziness. 59 y/o htn, dm, hld p/w atraumatic L great toe pain x today. pt states pain has been gradually worsening and now is a 10/10. Pain worse with palpation. Reports swelling to toe. No h/o gout. recently changed her diet. Denies alcohol use. denies fever, chills, chest pain, sob, cough, abd pain, n/v/d, headache, dizziness.  Has never had this befroe.

## 2021-05-15 ENCOUNTER — EMERGENCY (EMERGENCY)
Facility: HOSPITAL | Age: 61
LOS: 1 days | Discharge: HOME | End: 2021-05-15
Attending: EMERGENCY MEDICINE | Admitting: EMERGENCY MEDICINE
Payer: COMMERCIAL

## 2021-05-15 VITALS
OXYGEN SATURATION: 100 % | RESPIRATION RATE: 16 BRPM | DIASTOLIC BLOOD PRESSURE: 75 MMHG | SYSTOLIC BLOOD PRESSURE: 127 MMHG | HEART RATE: 71 BPM | TEMPERATURE: 98 F

## 2021-05-15 VITALS
OXYGEN SATURATION: 100 % | HEIGHT: 64 IN | HEART RATE: 89 BPM | RESPIRATION RATE: 18 BRPM | SYSTOLIC BLOOD PRESSURE: 122 MMHG | DIASTOLIC BLOOD PRESSURE: 75 MMHG | TEMPERATURE: 98 F

## 2021-05-15 DIAGNOSIS — D25.9 LEIOMYOMA OF UTERUS, UNSPECIFIED: Chronic | ICD-10-CM

## 2021-05-15 LAB
ANION GAP SERPL CALC-SCNC: 11 MMOL/L — SIGNIFICANT CHANGE UP (ref 7–14)
BASOPHILS # BLD AUTO: 0.11 K/UL — SIGNIFICANT CHANGE UP (ref 0–0.2)
BASOPHILS NFR BLD AUTO: 2.8 % — HIGH (ref 0–2)
BUN SERPL-MCNC: 20 MG/DL — SIGNIFICANT CHANGE UP (ref 7–23)
CALCIUM SERPL-MCNC: 10 MG/DL — SIGNIFICANT CHANGE UP (ref 8.4–10.5)
CHLORIDE SERPL-SCNC: 104 MMOL/L — SIGNIFICANT CHANGE UP (ref 98–107)
CO2 SERPL-SCNC: 24 MMOL/L — SIGNIFICANT CHANGE UP (ref 22–31)
CREAT SERPL-MCNC: 1.06 MG/DL — SIGNIFICANT CHANGE UP (ref 0.5–1.3)
CRP SERPL-MCNC: <4 MG/L — SIGNIFICANT CHANGE UP
EOSINOPHIL # BLD AUTO: 0.11 K/UL — SIGNIFICANT CHANGE UP (ref 0–0.5)
EOSINOPHIL NFR BLD AUTO: 2.8 % — SIGNIFICANT CHANGE UP (ref 0–6)
ERYTHROCYTE [SEDIMENTATION RATE] IN BLOOD: 23 MM/HR — SIGNIFICANT CHANGE UP (ref 4–25)
GLUCOSE SERPL-MCNC: 109 MG/DL — HIGH (ref 70–99)
HCT VFR BLD CALC: 39.8 % — SIGNIFICANT CHANGE UP (ref 34.5–45)
HGB BLD-MCNC: 12.4 G/DL — SIGNIFICANT CHANGE UP (ref 11.5–15.5)
IANC: 1.3 K/UL — LOW (ref 1.5–8.5)
LYMPHOCYTES # BLD AUTO: 1.26 K/UL — SIGNIFICANT CHANGE UP (ref 1–3.3)
LYMPHOCYTES # BLD AUTO: 31.8 % — SIGNIFICANT CHANGE UP (ref 13–44)
MCHC RBC-ENTMCNC: 27 PG — SIGNIFICANT CHANGE UP (ref 27–34)
MCHC RBC-ENTMCNC: 31.2 GM/DL — LOW (ref 32–36)
MCV RBC AUTO: 86.7 FL — SIGNIFICANT CHANGE UP (ref 80–100)
MONOCYTES # BLD AUTO: 0.26 K/UL — SIGNIFICANT CHANGE UP (ref 0–0.9)
MONOCYTES NFR BLD AUTO: 6.5 % — SIGNIFICANT CHANGE UP (ref 2–14)
NEUTROPHILS # BLD AUTO: 1.26 K/UL — LOW (ref 1.8–7.4)
NEUTROPHILS NFR BLD AUTO: 31.8 % — LOW (ref 43–77)
PLATELET # BLD AUTO: 203 K/UL — SIGNIFICANT CHANGE UP (ref 150–400)
POTASSIUM SERPL-MCNC: 4.3 MMOL/L — SIGNIFICANT CHANGE UP (ref 3.5–5.3)
POTASSIUM SERPL-SCNC: 4.3 MMOL/L — SIGNIFICANT CHANGE UP (ref 3.5–5.3)
RBC # BLD: 4.59 M/UL — SIGNIFICANT CHANGE UP (ref 3.8–5.2)
RBC # FLD: 13.9 % — SIGNIFICANT CHANGE UP (ref 10.3–14.5)
SODIUM SERPL-SCNC: 139 MMOL/L — SIGNIFICANT CHANGE UP (ref 135–145)
URATE SERPL-MCNC: 9.9 MG/DL — HIGH (ref 2.5–7)
WBC # BLD: 3.97 K/UL — SIGNIFICANT CHANGE UP (ref 3.8–10.5)
WBC # FLD AUTO: 3.97 K/UL — SIGNIFICANT CHANGE UP (ref 3.8–10.5)

## 2021-05-15 PROCEDURE — 73630 X-RAY EXAM OF FOOT: CPT | Mod: 26,LT

## 2021-05-15 PROCEDURE — 99284 EMERGENCY DEPT VISIT MOD MDM: CPT

## 2021-05-15 RX ORDER — INDOMETHACIN 50 MG
1 CAPSULE ORAL
Qty: 15 | Refills: 0
Start: 2021-05-15 | End: 2021-05-19

## 2021-05-15 RX ORDER — INDOMETHACIN 50 MG
1 CAPSULE ORAL
Qty: 9 | Refills: 0
Start: 2021-05-15 | End: 2021-05-17

## 2021-05-15 RX ORDER — INDOMETHACIN 50 MG
50 CAPSULE ORAL ONCE
Refills: 0 | Status: COMPLETED | OUTPATIENT
Start: 2021-05-15 | End: 2021-05-15

## 2021-05-15 RX ADMIN — Medication 50 MILLIGRAM(S): at 11:50

## 2021-05-15 NOTE — ED PROVIDER NOTE - NSFOLLOWUPINSTRUCTIONS_ED_ALL_ED_FT
Gout    You were seen in the Emergency Department today for Left big toe pain and swelling. You were diagnosed with suspected Gout. Here you were given Indomethacin and a steroid injection by the Podiatry team    A prescription for Indomethacin was sent to your pharmacy, please take as prescribed.     Please make a follow up appointment with the podiatrist, Dr. Evans ([phone]).     Please return to ED if you develop increased redness or swelling to toe, fevers, or any other concerning symptoms.

## 2021-05-15 NOTE — ED PROVIDER NOTE - MUSCULOSKELETAL, MLM
Spine appears normal, range of motion is not limited, no muscle or joint tenderness. Left great toe ttp and mild edema without erythema. No signs or symptoms of infection.

## 2021-05-15 NOTE — ED PROVIDER NOTE - CLINICAL SUMMARY MEDICAL DECISION MAKING FREE TEXT BOX
59 y/o female with PMHx of HTN ,HLD, DM, presents to the ED complaining of left great toe pain and swelling x1 week. Was seen 1 month ago for similar symptoms and d/c w/ suspected dx of gout and given colchicine and percocet w/o relief of symptoms.  VSS. Physical exam remarkable for L great toe w/ mild edema and ttp w/o erythema or s/s of infection/masses.  Dx pseudogout vs. gout. Will give indomethacin for pain relief. Discuss d/c home w/ allopurinol rx if improved. Reassess. 59 y/o female with PMHx of HTN ,HLD, DM, presents to the ED complaining of left great toe pain and swelling x1 week. Was seen 1 month ago for similar symptoms and d/c w/ suspected dx of gout and given colchicine and percocet w/o relief of symptoms.  VSS. Physical exam remarkable for L great toe w/ mild edema and ttp w/o erythema or s/s of infection/masses.  Dx pseudogout vs. gout. Will give indomethacin for pain relief. Reassess. Ivy att: 59 y/o female with PMHx of HTN ,HLD, DM, presents to the ED complaining of left great toe pain and swelling x1 week. Was seen 1 month ago for similar symptoms and d/c w/ suspected dx of gout and given colchicine and percocet w/o relief of symptoms.  VSS. Physical exam remarkable for L great toe w/ mild edema and ttp w/o erythema or s/s of infection/masses.  Dx pseudogout vs. gout. Will give indomethacin for pain relief. Reassess.

## 2021-05-15 NOTE — ED PROVIDER NOTE - CARE PROVIDERS DIRECT ADDRESSES
[email or web address]

## 2021-05-15 NOTE — ED PROVIDER NOTE - OBJECTIVE STATEMENT
61 y/o female with PMHx of HTN ,HLD, DM, presents to the ED complaining of left great toe pain and swelling x1 week. States trevor martinez has had similar presentation to the ED 1 month ago for same symptoms. Diagnosed with gout Colchicine and Percocet and was told to stop Hydrochlorothiazide. She said at the time, symptoms improved, but now as of one week ago, they have returned. Due to her pain, having difficulty ambulating. Went to see cardiologist, who prescribed her more Colchicine and percocet, but her symptoms are not going away. She denies fevers, chills, CP, SOB, abd pain, N/V/D. 61 y/o female with PMHx of HTN ,HLD, DM, presents to the ED complaining of left great toe pain and swelling x1 week. States that she has had similar presentation to the ED 1 month ago for same symptoms. Diagnosed with gout Colchicine and Percocet and was told to stop Hydrochlorothiazide. She said at the time, symptoms improved, but now as of one week ago, they have returned. Due to her pain, having difficulty ambulating. Went to see cardiologist, who prescribed her more Colchicine and percocet, but her symptoms are not going away. She denies fevers, chills, CP, SOB, abd pain, N/V/D.

## 2021-05-15 NOTE — ED ADULT TRIAGE NOTE - CHIEF COMPLAINT QUOTE
pt with gout to left foot was seen in ER x one  week ago. Pt states taking medication for pain but not working.

## 2021-05-15 NOTE — ED PROVIDER NOTE - PROGRESS NOTE DETAILS
Soumya Avila PGY1: Upon reassessment, pt pain not improved. Will consult podiatry for further management including possible steroid infection.

## 2021-05-15 NOTE — ED ADULT NURSE NOTE - NSIMPLEMENTINTERV_GEN_ALL_ED
Implemented All Universal Safety Interventions:  [place] to call system. Call bell, personal items and telephone within reach. Instruct patient to call for assistance. Room bathroom lighting operational. Non-slip footwear when patient is off stretcher. Physically safe environment: no spills, clutter or unnecessary equipment. Stretcher in lowest position, wheels locked, appropriate side rails in place.

## 2021-05-15 NOTE — CONSULT NOTE ADULT - SUBJECTIVE AND OBJECTIVE BOX
Podiatry pager #: [phone]/ [phone]    Patient is a 60y old  Female who presents with a chief complaint of left foot gout.    HPI:    59 y/o female with PMHx of HTN ,HLD, DM, presents to the ED complaining of left great toe pain and swelling x1 week. States that she has had similar presentation to the ED 1 month ago for same symptoms. Diagnosed with gout Colchicine and Percocet and was told to stop Hydrochlorothiazide. She said at the time, symptoms improved, but now as of one week ago, they have returned. Due to her pain, having difficulty ambulating. Went to see cardiologist, who prescribed her more Colchicine and percocet, but her symptoms are not going away. She denies fevers, chills, CP, SOB, abd pain, N/V/D.    PAST MEDICAL & SURGICAL HISTORY:  Asthma    HTN (hypertension)    HLD (hyperlipidemia)    Diabetes    Fibroid uterus        MEDICATIONS  (STANDING):    MEDICATIONS  (PRN):      Allergies    No Known Allergies    Intolerances        VITALS:    Vital Signs Last 24 Hrs  T(C): 36.8 (15 May 2021 11:08), Max: 36.8 (15 May 2021 11:08)  T(F): 98.2 (15 May 2021 11:08), Max: 98.2 (15 May 2021 11:08)  HR: 89 (15 May 2021 11:08) (89 - 89)  BP: 122/75 (15 May 2021 11:08) (122/75 - 122/75)  BP(mean): --  RR: 18 (15 May 2021 11:08) (18 - 18)  SpO2: 100% (15 May 2021 11:08) (100% - 100%)    LABS:                          12.4   3.97  )-----------( 203      ( 15 May 2021 14:21 )             39.8               CAPILLARY BLOOD GLUCOSE              LOWER EXTREMITY PHYSICAL EXAM:    Vasular: DP/PT _2/4, B/L, CFT <3 seconds B/L, Temperature gradient WNL, B/L.   Neuro: Epicritic sensation intact to the level of digits, B/L.  Musculoskeletal/Ortho: Pain w/ L 1st MTPJ ROM    Left first MTPJ mild swelling, no erythema, and tenderness noted at the medial MTPJ. No open lesions, no palpable abscess.     RADIOLOGY & ADDITIONAL STUDIES:

## 2021-05-15 NOTE — ED PROVIDER NOTE - PATIENT PORTAL LINK FT
You can access the FollowMyHealth Patient Portal offered by [facility] by registering at the following website: [email or web address]. By joining [employer]’s FollowMyHealth portal, you will also be able to view your health information using other applications (apps) compatible with our system.

## 2021-05-15 NOTE — CONSULT NOTE ADULT - ASSESSMENT
Pt is 59yo who presents w/ LF gout  -pt seen and examined  -afebrile, no leukocytosis, Uric Acid 9.3  -LFXR: unremarkable, official read still pending  -Left first MTPJ mild swelling, no erythema, and tenderness noted at the medial MTPJ. No open lesions, no palpable abscess.   -LF 1st MTPJ injection performed using 2cc of 0.5% Marcaine plain and 1 cc of Dexamethasone phosphate  -Pt to stay hydrated  -Recommend indomethacin for pain, and to follow up w/ Dr. Evans at [phone]  -Discussed w/ attending

## 2021-05-15 NOTE — ED ADULT NURSE NOTE - OBJECTIVE STATEMENT
pt c/o pain to left great toe x 1.5 weeks. Pt states was here last month and dx with gout. Pt eval by Dr. Avila and Dr. viveros. Indocin given as ordered. No obvious swelling , redness or deformity noted. Positive pedal pulses.

## 2021-05-15 NOTE — ED PROVIDER NOTE - CARE PROVIDER_API CALL
Keon Evans (DPM)  Podiatric Medicine and Surgery  [address] [place] [address]  Phone: [phone]  Fax: [phone]  Follow Up Time:

## 2021-07-05 ENCOUNTER — TRANSCRIPTION ENCOUNTER (OUTPATIENT)
Age: 61
End: 2021-07-05

## 2021-08-23 ENCOUNTER — TRANSCRIPTION ENCOUNTER (OUTPATIENT)
Age: 61
End: 2021-08-23

## 2021-09-13 ENCOUNTER — TRANSCRIPTION ENCOUNTER (OUTPATIENT)
Age: 61
End: 2021-09-13

## 2022-01-25 ENCOUNTER — EMERGENCY (EMERGENCY)
Facility: HOSPITAL | Age: 62
LOS: 1 days | Discharge: HOME | End: 2022-01-25
Attending: PERSONAL EMERGENCY RESPONSE ATTENDANT | Admitting: EMERGENCY MEDICINE
Payer: COMMERCIAL

## 2022-01-25 VITALS
TEMPERATURE: 98 F | HEART RATE: 72 BPM | SYSTOLIC BLOOD PRESSURE: 128 MMHG | RESPIRATION RATE: 20 BRPM | DIASTOLIC BLOOD PRESSURE: 61 MMHG | HEIGHT: 64 IN | OXYGEN SATURATION: 100 %

## 2022-01-25 DIAGNOSIS — D25.9 LEIOMYOMA OF UTERUS, UNSPECIFIED: Chronic | ICD-10-CM

## 2022-01-25 LAB
A1C WITH ESTIMATED AVERAGE GLUCOSE RESULT: 7.6 % — HIGH (ref 4–5.6)
ALBUMIN SERPL ELPH-MCNC: 4.1 G/DL — SIGNIFICANT CHANGE UP (ref 3.3–5)
ALP SERPL-CCNC: 102 U/L — SIGNIFICANT CHANGE UP (ref 40–120)
ALT FLD-CCNC: 15 U/L — SIGNIFICANT CHANGE UP (ref 4–33)
ANION GAP SERPL CALC-SCNC: 11 MMOL/L — SIGNIFICANT CHANGE UP (ref 7–14)
APPEARANCE UR: CLEAR — SIGNIFICANT CHANGE UP
AST SERPL-CCNC: 16 U/L — SIGNIFICANT CHANGE UP (ref 4–32)
BASOPHILS # BLD AUTO: 0.01 K/UL — SIGNIFICANT CHANGE UP (ref 0–0.2)
BASOPHILS NFR BLD AUTO: 0.1 % — SIGNIFICANT CHANGE UP (ref 0–2)
BILIRUB SERPL-MCNC: 0.5 MG/DL — SIGNIFICANT CHANGE UP (ref 0.2–1.2)
BILIRUB UR-MCNC: NEGATIVE — SIGNIFICANT CHANGE UP
BLOOD GAS VENOUS COMPREHENSIVE RESULT: SIGNIFICANT CHANGE UP
BUN SERPL-MCNC: 16 MG/DL — SIGNIFICANT CHANGE UP (ref 7–23)
CALCIUM SERPL-MCNC: 9.6 MG/DL — SIGNIFICANT CHANGE UP (ref 8.4–10.5)
CHLORIDE SERPL-SCNC: 106 MMOL/L — SIGNIFICANT CHANGE UP (ref 98–107)
CO2 SERPL-SCNC: 24 MMOL/L — SIGNIFICANT CHANGE UP (ref 22–31)
COLOR SPEC: YELLOW — SIGNIFICANT CHANGE UP
CREAT SERPL-MCNC: 0.75 MG/DL — SIGNIFICANT CHANGE UP (ref 0.5–1.3)
DIFF PNL FLD: NEGATIVE — SIGNIFICANT CHANGE UP
EOSINOPHIL # BLD AUTO: 0.18 K/UL — SIGNIFICANT CHANGE UP (ref 0–0.5)
EOSINOPHIL NFR BLD AUTO: 2 % — SIGNIFICANT CHANGE UP (ref 0–6)
ESTIMATED AVERAGE GLUCOSE: 171 — SIGNIFICANT CHANGE UP
FLU A RESULT: SIGNIFICANT CHANGE UP
FLU A RESULT: SIGNIFICANT CHANGE UP
FLUAV AG NPH QL: SIGNIFICANT CHANGE UP
FLUBV AG NPH QL: SIGNIFICANT CHANGE UP
GLUCOSE SERPL-MCNC: 185 MG/DL — HIGH (ref 70–99)
GLUCOSE UR QL: NEGATIVE — SIGNIFICANT CHANGE UP
HCT VFR BLD CALC: 36.9 % — SIGNIFICANT CHANGE UP (ref 34.5–45)
HGB BLD-MCNC: 11.3 G/DL — LOW (ref 11.5–15.5)
IANC: 6.26 K/UL — SIGNIFICANT CHANGE UP (ref 1.5–8.5)
IMM GRANULOCYTES NFR BLD AUTO: 0.3 % — SIGNIFICANT CHANGE UP (ref 0–1.5)
KETONES UR-MCNC: ABNORMAL
LEUKOCYTE ESTERASE UR-ACNC: ABNORMAL
LYMPHOCYTES # BLD AUTO: 1.79 K/UL — SIGNIFICANT CHANGE UP (ref 1–3.3)
LYMPHOCYTES # BLD AUTO: 20.3 % — SIGNIFICANT CHANGE UP (ref 13–44)
MCHC RBC-ENTMCNC: 28.8 PG — SIGNIFICANT CHANGE UP (ref 27–34)
MCHC RBC-ENTMCNC: 30.6 GM/DL — LOW (ref 32–36)
MCV RBC AUTO: 93.9 FL — SIGNIFICANT CHANGE UP (ref 80–100)
MONOCYTES # BLD AUTO: 0.55 K/UL — SIGNIFICANT CHANGE UP (ref 0–0.9)
MONOCYTES NFR BLD AUTO: 6.2 % — SIGNIFICANT CHANGE UP (ref 2–14)
NEUTROPHILS # BLD AUTO: 6.26 K/UL — SIGNIFICANT CHANGE UP (ref 1.8–7.4)
NEUTROPHILS NFR BLD AUTO: 71.1 % — SIGNIFICANT CHANGE UP (ref 43–77)
NITRITE UR-MCNC: NEGATIVE — SIGNIFICANT CHANGE UP
NRBC # BLD: 0 /100 WBCS — SIGNIFICANT CHANGE UP
NRBC # FLD: 0 K/UL — SIGNIFICANT CHANGE UP
PH UR: 6 — SIGNIFICANT CHANGE UP (ref 5–8)
PLATELET # BLD AUTO: 200 K/UL — SIGNIFICANT CHANGE UP (ref 150–400)
POTASSIUM SERPL-MCNC: 4 MMOL/L — SIGNIFICANT CHANGE UP (ref 3.5–5.3)
POTASSIUM SERPL-SCNC: 4 MMOL/L — SIGNIFICANT CHANGE UP (ref 3.5–5.3)
PROT SERPL-MCNC: 7.3 G/DL — SIGNIFICANT CHANGE UP (ref 6–8.3)
PROT UR-MCNC: ABNORMAL
RBC # BLD: 3.93 M/UL — SIGNIFICANT CHANGE UP (ref 3.8–5.2)
RBC # FLD: 14 % — SIGNIFICANT CHANGE UP (ref 10.3–14.5)
RSV RESULT: SIGNIFICANT CHANGE UP
RSV RNA RESP QL NAA+PROBE: SIGNIFICANT CHANGE UP
SODIUM SERPL-SCNC: 141 MMOL/L — SIGNIFICANT CHANGE UP (ref 135–145)
SP GR SPEC: 1.02 — SIGNIFICANT CHANGE UP (ref 1–1.05)
UROBILINOGEN FLD QL: SIGNIFICANT CHANGE UP
WBC # BLD: 8.82 K/UL — SIGNIFICANT CHANGE UP (ref 3.8–10.5)
WBC # FLD AUTO: 8.82 K/UL — SIGNIFICANT CHANGE UP (ref 3.8–10.5)

## 2022-01-25 PROCEDURE — 74019 RADEX ABDOMEN 2 VIEWS: CPT | Mod: 26

## 2022-01-25 PROCEDURE — 99220: CPT

## 2022-01-25 PROCEDURE — 74177 CT ABD & PELVIS W/CONTRAST: CPT | Mod: 26,MA

## 2022-01-25 RX ORDER — DEXTROSE 50 % IN WATER 50 %
12.5 SYRINGE (ML) INTRAVENOUS ONCE
Refills: 0 | Status: DISCONTINUED | OUTPATIENT
Start: 2022-01-25 | End: 2022-01-28

## 2022-01-25 RX ORDER — SODIUM CHLORIDE 9 MG/ML
1000 INJECTION, SOLUTION INTRAVENOUS
Refills: 0 | Status: DISCONTINUED | OUTPATIENT
Start: 2022-01-25 | End: 2022-01-28

## 2022-01-25 RX ORDER — MORPHINE SULFATE 50 MG/1
4 CAPSULE, EXTENDED RELEASE ORAL ONCE
Refills: 0 | Status: DISCONTINUED | OUTPATIENT
Start: 2022-01-25 | End: 2022-01-25

## 2022-01-25 RX ORDER — METRONIDAZOLE 500 MG
500 TABLET ORAL ONCE
Refills: 0 | Status: COMPLETED | OUTPATIENT
Start: 2022-01-25 | End: 2022-01-25

## 2022-01-25 RX ORDER — SODIUM CHLORIDE 9 MG/ML
1000 INJECTION INTRAMUSCULAR; INTRAVENOUS; SUBCUTANEOUS
Refills: 0 | Status: DISCONTINUED | OUTPATIENT
Start: 2022-01-25 | End: 2022-01-28

## 2022-01-25 RX ORDER — LOSARTAN POTASSIUM 100 MG/1
50 TABLET, FILM COATED ORAL DAILY
Refills: 0 | Status: DISCONTINUED | OUTPATIENT
Start: 2022-01-25 | End: 2022-01-28

## 2022-01-25 RX ORDER — CIPROFLOXACIN LACTATE 400MG/40ML
400 VIAL (ML) INTRAVENOUS ONCE
Refills: 0 | Status: COMPLETED | OUTPATIENT
Start: 2022-01-25 | End: 2022-01-25

## 2022-01-25 RX ORDER — SODIUM CHLORIDE 9 MG/ML
1000 INJECTION INTRAMUSCULAR; INTRAVENOUS; SUBCUTANEOUS ONCE
Refills: 0 | Status: COMPLETED | OUTPATIENT
Start: 2022-01-25 | End: 2022-01-25

## 2022-01-25 RX ORDER — ACETAMINOPHEN 500 MG
650 TABLET ORAL ONCE
Refills: 0 | Status: COMPLETED | OUTPATIENT
Start: 2022-01-25 | End: 2022-01-25

## 2022-01-25 RX ORDER — CIPROFLOXACIN LACTATE 400MG/40ML
400 VIAL (ML) INTRAVENOUS EVERY 12 HOURS
Refills: 0 | Status: DISCONTINUED | OUTPATIENT
Start: 2022-01-26 | End: 2022-01-28

## 2022-01-25 RX ORDER — GABAPENTIN 400 MG/1
300 CAPSULE ORAL AT BEDTIME
Refills: 0 | Status: DISCONTINUED | OUTPATIENT
Start: 2022-01-25 | End: 2022-01-28

## 2022-01-25 RX ORDER — DEXTROSE 50 % IN WATER 50 %
25 SYRINGE (ML) INTRAVENOUS ONCE
Refills: 0 | Status: DISCONTINUED | OUTPATIENT
Start: 2022-01-25 | End: 2022-01-28

## 2022-01-25 RX ORDER — INSULIN LISPRO 100/ML
VIAL (ML) SUBCUTANEOUS
Refills: 0 | Status: DISCONTINUED | OUTPATIENT
Start: 2022-01-25 | End: 2022-01-28

## 2022-01-25 RX ORDER — DEXTROSE 50 % IN WATER 50 %
15 SYRINGE (ML) INTRAVENOUS ONCE
Refills: 0 | Status: DISCONTINUED | OUTPATIENT
Start: 2022-01-25 | End: 2022-01-28

## 2022-01-25 RX ORDER — METRONIDAZOLE 500 MG
500 TABLET ORAL EVERY 8 HOURS
Refills: 0 | Status: DISCONTINUED | OUTPATIENT
Start: 2022-01-25 | End: 2022-01-28

## 2022-01-25 RX ORDER — GLUCAGON INJECTION, SOLUTION 0.5 MG/.1ML
1 INJECTION, SOLUTION SUBCUTANEOUS ONCE
Refills: 0 | Status: DISCONTINUED | OUTPATIENT
Start: 2022-01-25 | End: 2022-01-28

## 2022-01-25 RX ORDER — INSULIN LISPRO 100/ML
VIAL (ML) SUBCUTANEOUS AT BEDTIME
Refills: 0 | Status: DISCONTINUED | OUTPATIENT
Start: 2022-01-25 | End: 2022-01-28

## 2022-01-25 RX ORDER — MONTELUKAST 4 MG/1
10 TABLET, CHEWABLE ORAL DAILY
Refills: 0 | Status: DISCONTINUED | OUTPATIENT
Start: 2022-01-25 | End: 2022-01-28

## 2022-01-25 RX ORDER — ATORVASTATIN CALCIUM 80 MG/1
40 TABLET, FILM COATED ORAL AT BEDTIME
Refills: 0 | Status: DISCONTINUED | OUTPATIENT
Start: 2022-01-25 | End: 2022-01-28

## 2022-01-25 RX ADMIN — GABAPENTIN 300 MILLIGRAM(S): 400 CAPSULE ORAL at 22:44

## 2022-01-25 RX ADMIN — SODIUM CHLORIDE 100 MILLILITER(S): 9 INJECTION INTRAMUSCULAR; INTRAVENOUS; SUBCUTANEOUS at 20:22

## 2022-01-25 RX ADMIN — SODIUM CHLORIDE 1000 MILLILITER(S): 9 INJECTION INTRAMUSCULAR; INTRAVENOUS; SUBCUTANEOUS at 11:52

## 2022-01-25 RX ADMIN — MORPHINE SULFATE 4 MILLIGRAM(S): 50 CAPSULE, EXTENDED RELEASE ORAL at 11:52

## 2022-01-25 RX ADMIN — Medication 200 MILLIGRAM(S): at 17:11

## 2022-01-25 RX ADMIN — Medication 100 MILLIGRAM(S): at 16:14

## 2022-01-25 RX ADMIN — MORPHINE SULFATE 4 MILLIGRAM(S): 50 CAPSULE, EXTENDED RELEASE ORAL at 15:19

## 2022-01-25 RX ADMIN — SODIUM CHLORIDE 1000 MILLILITER(S): 9 INJECTION INTRAMUSCULAR; INTRAVENOUS; SUBCUTANEOUS at 16:14

## 2022-01-25 RX ADMIN — MORPHINE SULFATE 4 MILLIGRAM(S): 50 CAPSULE, EXTENDED RELEASE ORAL at 16:15

## 2022-01-25 RX ADMIN — ATORVASTATIN CALCIUM 40 MILLIGRAM(S): 80 TABLET, FILM COATED ORAL at 22:44

## 2022-01-25 NOTE — ED CDU PROVIDER INITIAL DAY NOTE - ATTENDING CONTRIBUTION TO CARE
Attending MD Walker.  Agree with above.  Pt is a 62 yo female with pmhx of DM, HTN, HLD who began to have abd'l pain on saturday with diarrhea and intermittent BRBPR, [facility] performed CT demonstrating sigmoid diverticulosis.  After pt's sxs improved d/c'd home on bentyl and tx for hemorrhoid.  Pain returned this morning and is more severe despite bentyl/tylenol.  Denies n/v/fevers or urinary sxs.  Pt appears uncomfortable.  Diffuse lower abdominal TTP.    PT with diverticulitis now in addition to recent bleeding diverticulosis.  Pt's pain significant.  Planned CDU stay for pain control, IV abxs and obs for improvement in sxs and poss recurrence of bleeding.  PT hemodynamically stable for transfer of care to CDU/obs unit.

## 2022-01-25 NOTE — ED ADULT TRIAGE NOTE - CHIEF COMPLAINT QUOTE
pt c/o abdominal pain associated with diarrhea with bright red blood in the stool since saturday. pt states she did not notice blood in stool last night. pt denies blood thinner use, sob, chest pain, n/v, fevers, chills. PMH of DM, HTN.

## 2022-01-25 NOTE — ED PROVIDER NOTE - ATTENDING CONTRIBUTION TO CARE
Attending MD Walker.  Agree with above.  Pt is a 60 yo female with pmhx of DM, HTN, HLD who began to have abd'l pain on saturday with diarrhea and intermittent BRBPR, [facility] performed CT demonstrating sigmoid diverticulosis.  After pt's sxs improved d/c'd home on bentyl and tx for hemorrhoid.  Pain returned this morning and is more severe despite bentyl/tylenol.  Denies n/v/fevers or urinary sxs.  Pt appears uncomfortable.  Diffuse lower abdominal TTP.

## 2022-01-25 NOTE — ED PROVIDER NOTE - OBJECTIVE STATEMENT
60 yo F with a PMHX of HTN, HLD, NIDDM, hemorrhoids here with c/o diffuse lower abdominal pain, cramping, severe in nature, a/w diarrhea and few episodes of BRBPR (last episode last night, had only brown diarrhea this am) first began 4 days ago, was evaluated at [facility] days ago with CT performed showing sigmoid Diverticulosis, fibroid uterus, was discharged home with bentyl and took Tylenol with no relief. Denies nausea, vomiting, fever, chills, dizziness, lightheadedness, cp ,sob, urinary/ vaginal complaints. No AC. Attempted to make appointment with GI however cant get appointment for one month.

## 2022-01-25 NOTE — ED ADULT NURSE REASSESSMENT NOTE - NS ED NURSE REASSESS COMMENT FT1
Pt awake and alert, respirations are even and unlabored, sating at 100%, and in no acute distress at this time. Pt denies abdominal pain and dyspepsia. Report given to CDU RN.

## 2022-01-25 NOTE — ED CDU PROVIDER INITIAL DAY NOTE - OBJECTIVE STATEMENT
60 yo F with a PMHX of HTN, HLD, NIDDM, hemorrhoids here with c/o diffuse lower abdominal pain, cramping, severe in nature, a/w diarrhea and few episodes of BRBPR (last episode last night, had only brown diarrhea this am) first began 4 days ago, was evaluated at [facility] days ago with CT performed showing sigmoid Diverticulosis, fibroid uterus, was discharged home with bentyl and took Tylenol with no relief. Denies nausea, vomiting, fever, chills, dizziness, lightheadedness, cp ,sob, urinary/ vaginal complaints. No AC. Attempted to make appointment with GI however cant get appointment for one month.    CDU EVETTE Rodriguez: Agree with above. 62 y/o F hx HTN, DM, HLD, found to have uncomplicated diverticulitis in the main ED on CT abdomen, requiring multiple doses of pain medications, sent to cdu for pain control and IV abx. Pt states abdominal pain is somewhat improving, now just feels bloated. Would like to try eating now. Diarrhea has resolved. Denies eating any unusual foods.

## 2022-01-25 NOTE — ED ADULT NURSE NOTE - OBJECTIVE STATEMENT
Receive pt. in ER room 26 alert and oriented x 4, presenting to the ER with complaints of abdominal pain. Pt. stated " I started having pain in my abdomen on Saturday I went to [facility] they did a cat-scan and told me I have diverticulitis, my pain is getting worse and I have some diarrhea". Medicated as ordered, labs sent.

## 2022-01-25 NOTE — ED CDU PROVIDER INITIAL DAY NOTE - NSICDXFAMILYHX_GEN_ALL_CORE_FT
FAMILY HISTORY:  Father  Still living? Unknown  Family history of diabetes mellitus (DM), Age at diagnosis: Age Unknown  FH: HTN (hypertension), Age at diagnosis: Age Unknown

## 2022-01-25 NOTE — ED ADULT NURSE NOTE - NSIMPLEMENTINTERV_GEN_ALL_ED
Implemented All Universal Safety Interventions:  [place] to call system. Call bell, personal items and telephone within reach. Instruct patient to call for assistance. Room bathroom lighting operational. Non-slip footwear when patient is off stretcher. Physically safe environment: no spills, clutter or unnecessary equipment. Stretcher in lowest position, wheels locked, appropriate side rails in place.

## 2022-01-26 VITALS
RESPIRATION RATE: 18 BRPM | TEMPERATURE: 98 F | OXYGEN SATURATION: 100 % | DIASTOLIC BLOOD PRESSURE: 69 MMHG | SYSTOLIC BLOOD PRESSURE: 128 MMHG | HEART RATE: 67 BPM

## 2022-01-26 LAB
CULTURE RESULTS: SIGNIFICANT CHANGE UP
SPECIMEN SOURCE: SIGNIFICANT CHANGE UP

## 2022-01-26 PROCEDURE — 99217: CPT

## 2022-01-26 RX ORDER — ACETAMINOPHEN 500 MG
650 TABLET ORAL ONCE
Refills: 0 | Status: COMPLETED | OUTPATIENT
Start: 2022-01-26 | End: 2022-01-26

## 2022-01-26 RX ORDER — METRONIDAZOLE 500 MG
1 TABLET ORAL
Qty: 28 | Refills: 0
Start: 2022-01-26 | End: 2022-02-01

## 2022-01-26 RX ORDER — MOXIFLOXACIN HYDROCHLORIDE TABLETS, 400 MG 400 MG/1
1 TABLET, FILM COATED ORAL
Qty: 14 | Refills: 0
Start: 2022-01-26 | End: 2022-02-01

## 2022-01-26 RX ORDER — FLUCONAZOLE 150 MG/1
1 TABLET ORAL
Qty: 2 | Refills: 0
Start: 2022-01-26

## 2022-01-26 RX ADMIN — Medication 100 MILLIGRAM(S): at 07:37

## 2022-01-26 RX ADMIN — Medication 200 MILLIGRAM(S): at 05:20

## 2022-01-26 RX ADMIN — Medication 400 MILLIGRAM(S): at 06:40

## 2022-01-26 RX ADMIN — Medication 100 MILLIGRAM(S): at 00:06

## 2022-01-26 RX ADMIN — Medication 500 MILLIGRAM(S): at 01:13

## 2022-01-26 RX ADMIN — Medication 650 MILLIGRAM(S): at 00:06

## 2022-01-26 RX ADMIN — LOSARTAN POTASSIUM 50 MILLIGRAM(S): 100 TABLET, FILM COATED ORAL at 05:54

## 2022-01-26 RX ADMIN — Medication 2: at 07:38

## 2022-01-26 RX ADMIN — Medication 650 MILLIGRAM(S): at 09:14

## 2022-01-26 NOTE — ED CDU PROVIDER DISPOSITION NOTE - NSFOLLOWUPINSTRUCTIONS_ED_ALL_ED_FT
Follow up with your GI doctor in 2-3 days.  Take Cipro 2x a day for 7 days.  Take metronidazole every 6 hours for 7 days.  Take diflucan 150mg x 1 dose if you develop symptoms of a yeast infection.  You can repeat this in 1 week if you still have those symptoms.    Return to the ED for worsening pain, fever, vomiting or other emergent concerns.

## 2022-01-26 NOTE — ED CDU PROVIDER DISPOSITION NOTE - PATIENT PORTAL LINK FT
You can access the FollowMyHealth Patient Portal offered by [facility] by registering at the following website: [email or web address]. By joining [employer]’s FollowMyHealth portal, you will also be able to view your health information using other applications (apps) compatible with our system.

## 2022-01-26 NOTE — ED CDU PROVIDER SUBSEQUENT DAY NOTE - PROGRESS NOTE DETAILS
Pt signed out to me by EVETTE Cotton: 61yF w/pmhx NIDDM, HTN, HLD presented to the ED with diffuse lower abdominal pain w/diarrhea and few episodes of BRBPR (now resolved). In main ED Hgb 11.3 Hct 36.9, CT showing acute uncomplicated sigmoid diverticulitis. Sent to CDU for IV abx (cipro/flagyl) and pain control. Pt is resting comfortably at this time. pt feeling better today.  tolerating po.  will discharge with po antibiotics and GI followup.

## 2022-01-26 NOTE — ED CDU PROVIDER SUBSEQUENT DAY NOTE - PHYSICAL EXAMINATION
Vital signs reviewed.   CONSTITUTIONAL: Well-appearing; well-nourished; in no apparent distress. Non-toxic appearing.   HEAD: Normocephalic, atraumatic.  EYES: Normal conjunctiva and no sclera injection noted  ENT: normal nose; no rhinorrhea  CARD: Normal S1, S2  RESP: Normal chest excursion with respiration; breath sounds clear and equal bilaterally  ABD/GI: soft, non-distended; non-tender  EXT/MS: moves all extremities; distal pulses are normal, no pedal edema.  SKIN: Normal for age and race; warm; dry; good turgor; no apparent lesions or exudate noted.  NEURO: Awake, alert, oriented x 3  PSYCH: Normal mood; appropriate affect.

## 2022-01-26 NOTE — ED CDU PROVIDER SUBSEQUENT DAY NOTE - HISTORY
60 yo F with a PMHX of HTN, HLD, NIDDM, hemorrhoids here with c/o diffuse lower abdominal pain. Found to have uncomplicated diverticulitis in the main ED on CT abdomen, requiring multiple doses of pain medications, sent to cdu for pain control and IV abx. Pt states abdominal pain is somewhat improving, now just feels bloated. Would like to try eating now. Diarrhea has resolved. Denies eating any unusual foods.

## 2022-01-26 NOTE — ED CDU PROVIDER DISPOSITION NOTE - CLINICAL COURSE
Pt presented with abdominal pain.  CT showed diverticulitis.  pt improved with IV antibiotics.  tolerating po.

## 2022-01-26 NOTE — ED CDU PROVIDER SUBSEQUENT DAY NOTE - MEDICAL DECISION MAKING DETAILS
62 yo F with a PMHX of HTN, HLD, NIDDM, hemorrhoids here with c/o diffuse lower abdominal pain. Found to have uncomplicated diverticulitis in the main ED on CT abdomen, requiring multiple doses of pain medications, sent to cdu for pain control and IV abx. Pt states abdominal pain is somewhat improving, now just feels bloated. Would like to try eating now. Diarrhea has resolved. Denies eating any unusual foods.

## 2022-02-24 ENCOUNTER — APPOINTMENT (OUTPATIENT)
Dept: GASTROENTEROLOGY | Facility: CLINIC | Age: 62
End: 2022-02-24
Payer: COMMERCIAL

## 2022-02-24 VITALS
HEIGHT: 64 IN | HEART RATE: 71 BPM | BODY MASS INDEX: 47.63 KG/M2 | SYSTOLIC BLOOD PRESSURE: 130 MMHG | DIASTOLIC BLOOD PRESSURE: 70 MMHG | WEIGHT: 279 LBS

## 2022-02-24 DIAGNOSIS — K22.2 ESOPHAGEAL OBSTRUCTION: ICD-10-CM

## 2022-02-24 DIAGNOSIS — Z86.010 PERSONAL HISTORY OF COLONIC POLYPS: ICD-10-CM

## 2022-02-24 DIAGNOSIS — K21.00 GASTRO-ESOPHAGEAL REFLUX DISEASE WITH ESOPHAGITIS, WITHOUT BLEEDING: ICD-10-CM

## 2022-02-24 DIAGNOSIS — R13.10 DYSPHAGIA, UNSPECIFIED: ICD-10-CM

## 2022-02-24 PROCEDURE — 99215 OFFICE O/P EST HI 40 MIN: CPT

## 2022-02-24 RX ORDER — NAPROXEN 500 MG/1
500 TABLET ORAL
Qty: 14 | Refills: 0 | Status: DISCONTINUED | COMMUNITY
Start: 2019-11-02 | End: 2022-02-24

## 2022-02-24 RX ORDER — GLIMEPIRIDE 2 MG/1
2 TABLET ORAL TWICE DAILY
Refills: 0 | Status: ACTIVE | COMMUNITY

## 2022-02-24 RX ORDER — TIZANIDINE 2 MG/1
2 TABLET ORAL
Qty: 60 | Refills: 0 | Status: DISCONTINUED | COMMUNITY
Start: 2019-12-09 | End: 2022-02-24

## 2022-02-24 RX ORDER — METFORMIN HYDROCHLORIDE 500 MG/1
500 TABLET, COATED ORAL
Refills: 0 | Status: DISCONTINUED | COMMUNITY
End: 2022-02-24

## 2022-02-24 RX ORDER — POLYETHYLENE GLYCOL-3350, SODIUM CHLORIDE, POTASSIUM CHLORIDE AND SODIUM BICARBONATE 420; 11.2; 5.72; 1.48 G/438.4G; G/438.4G; G/438.4G; G/438.4G
420 POWDER, FOR SOLUTION ORAL
Qty: 1 | Refills: 0 | Status: ACTIVE | COMMUNITY
Start: 2020-02-18 | End: 1900-01-01

## 2022-02-24 RX ORDER — OXYBUTYNIN CHLORIDE 5 MG/1
5 TABLET, EXTENDED RELEASE ORAL
Qty: 90 | Refills: 0 | Status: DISCONTINUED | COMMUNITY
Start: 2019-12-09 | End: 2022-02-24

## 2022-02-24 RX ORDER — SITAGLIPTIN 100 MG/1
100 TABLET, FILM COATED ORAL DAILY
Refills: 0 | Status: ACTIVE | COMMUNITY

## 2022-02-24 NOTE — HISTORY OF PRESENT ILLNESS
[Heartburn] : stable heartburn [Nausea] : denies nausea [Vomiting] : denies vomiting [Diarrhea] : resolved diarrhea [Constipation] : denies constipation [Yellow Skin Or Eyes (Jaundice)] : denies jaundice [Abdominal Pain] : resolved abdominal pain [_________] : Performed [unfilled] [de-identified] : Lavonne presents to the office today after having an uncomplicated sigmoid diverticulitis attack.  The patient was last seen in the office in February 2020 by Dr. Grimaldo.\par \par About one month ago, the patient started to have cramping lower abdominal pain followed by diarrhea for 6 hours.  The pain was not improving by the morning and she started to have hematochezia so she went to [facility] ER.  After a CT scan, she was discharged home with antispasmodics and told the bleeding was likely from hemorrhoids.  Over the next few days, she continued to feel ill with lower abdominal spasms so she went to the [facility] ER on January 25.  In the ER, she was found to have mild anemia and a CT A/P showed uncomplicated sigmoid diverticulitis.  She was discharged on Cipro/Flagyl and reports that her symptoms resolved with antibiotics.\par \par  After her last office vist, she was scheduled for an EGD and colonoscopy at [facility] but the procedures were cancelled due to the COVID outbreak.  She was due for a screening colonoscopy due to family history of colon cancer in her brohther (age 61).  She also continues to have reflux with a sensation of a "ball in her chest".  Her last colonoscopy in 2016 revealed hyperplastic polyps.  A hiatal hernia with a Schatzki ring was seen on her EGD.

## 2022-02-24 NOTE — ASSESSMENT
[FreeTextEntry1] : 1.  Episode of uncomplicated sigmoid diverticulitis in January 2022; family history of colon cancer (brother); polyps, left-sided diverticulosis, hemorrhoids at last colonoscopy December 2016.  Rule out polyps, colitis, neoplasm.\par 2.  GERD, episodic dysphagia; mild esophagitis, hiatal hernia with patulous Schatzki ring at EGD December 2016.\par 3.  Morbid obesity.\par 4.  Type 2 diabetes mellitus, suboptimally controlled.\par 5.  Ex-smoker.\par 6.  Hypertension.\par 7.  Hyperlipidemia.\par 8.  DJD of spine.\par 9.  Status post myomectomy, ovarian cystectomy.\par \par Plan:\par - ER labs and CT images reviewed.\par - Patient was counseled on GERD lifestyle modifications including head of bed elevation at night, avoiding lying down 2-3 hours after eating, weight loss, avoiding tight-fitting clothing, eating small, frequent meals, and minimizing potential food triggers (tomatoes/sauce, caffeine, alcohol, spicy foods, citrus foods, red meat, chocolate, mint, carbonated beverages).\par - Diagnosis of diverticulitis, significance, prognosis, and treatment discussed.\par - Patient was advised to undergo endoscopy and colonoscopy in the hospital setting- procedure, risks, benefits, and alternatives were explained. Patient agreeable. Brochure given. Golytely prep.

## 2022-03-23 ENCOUNTER — INPATIENT (INPATIENT)
Facility: HOSPITAL | Age: 62
LOS: 5 days | Discharge: HOME | End: 2022-03-29
Attending: INTERNAL MEDICINE | Admitting: INTERNAL MEDICINE
Payer: COMMERCIAL

## 2022-03-23 VITALS
HEIGHT: 64 IN | SYSTOLIC BLOOD PRESSURE: 125 MMHG | HEART RATE: 78 BPM | OXYGEN SATURATION: 100 % | TEMPERATURE: 98 F | DIASTOLIC BLOOD PRESSURE: 56 MMHG | RESPIRATION RATE: 16 BRPM

## 2022-03-23 DIAGNOSIS — R07.9 CHEST PAIN, UNSPECIFIED: ICD-10-CM

## 2022-03-23 DIAGNOSIS — D25.9 LEIOMYOMA OF UTERUS, UNSPECIFIED: Chronic | ICD-10-CM

## 2022-03-23 DIAGNOSIS — I10 ESSENTIAL (PRIMARY) HYPERTENSION: ICD-10-CM

## 2022-03-23 DIAGNOSIS — E78.5 HYPERLIPIDEMIA, UNSPECIFIED: ICD-10-CM

## 2022-03-23 DIAGNOSIS — J45.909 UNSPECIFIED ASTHMA, UNCOMPLICATED: ICD-10-CM

## 2022-03-23 DIAGNOSIS — N83.209 UNSPECIFIED OVARIAN CYST, UNSPECIFIED SIDE: Chronic | ICD-10-CM

## 2022-03-23 DIAGNOSIS — Z29.9 ENCOUNTER FOR PROPHYLACTIC MEASURES, UNSPECIFIED: ICD-10-CM

## 2022-03-23 DIAGNOSIS — M10.9 GOUT, UNSPECIFIED: ICD-10-CM

## 2022-03-23 DIAGNOSIS — E11.9 TYPE 2 DIABETES MELLITUS WITHOUT COMPLICATIONS: ICD-10-CM

## 2022-03-23 LAB
ALBUMIN SERPL ELPH-MCNC: 4.6 G/DL — SIGNIFICANT CHANGE UP (ref 3.3–5)
ALP SERPL-CCNC: 122 U/L — HIGH (ref 40–120)
ALT FLD-CCNC: 17 U/L — SIGNIFICANT CHANGE UP (ref 4–33)
ANION GAP SERPL CALC-SCNC: 13 MMOL/L — SIGNIFICANT CHANGE UP (ref 7–14)
AST SERPL-CCNC: 16 U/L — SIGNIFICANT CHANGE UP (ref 4–32)
BASOPHILS # BLD AUTO: 0.02 K/UL — SIGNIFICANT CHANGE UP (ref 0–0.2)
BASOPHILS NFR BLD AUTO: 0.3 % — SIGNIFICANT CHANGE UP (ref 0–2)
BILIRUB SERPL-MCNC: 0.5 MG/DL — SIGNIFICANT CHANGE UP (ref 0.2–1.2)
BUN SERPL-MCNC: 25 MG/DL — HIGH (ref 7–23)
CALCIUM SERPL-MCNC: 10.5 MG/DL — SIGNIFICANT CHANGE UP (ref 8.4–10.5)
CHLORIDE SERPL-SCNC: 104 MMOL/L — SIGNIFICANT CHANGE UP (ref 98–107)
CO2 SERPL-SCNC: 24 MMOL/L — SIGNIFICANT CHANGE UP (ref 22–31)
CREAT SERPL-MCNC: 0.88 MG/DL — SIGNIFICANT CHANGE UP (ref 0.5–1.3)
D DIMER BLD IA.RAPID-MCNC: <150 NG/ML DDU — SIGNIFICANT CHANGE UP
EGFR: 75 ML/MIN/1.73M2 — SIGNIFICANT CHANGE UP
EOSINOPHIL # BLD AUTO: 0.23 K/UL — SIGNIFICANT CHANGE UP (ref 0–0.5)
EOSINOPHIL NFR BLD AUTO: 3.6 % — SIGNIFICANT CHANGE UP (ref 0–6)
FLUAV AG NPH QL: SIGNIFICANT CHANGE UP
FLUBV AG NPH QL: SIGNIFICANT CHANGE UP
GLUCOSE BLDC GLUCOMTR-MCNC: 123 MG/DL — HIGH (ref 70–99)
GLUCOSE SERPL-MCNC: 129 MG/DL — HIGH (ref 70–99)
HCT VFR BLD CALC: 39 % — SIGNIFICANT CHANGE UP (ref 34.5–45)
HGB BLD-MCNC: 12.4 G/DL — SIGNIFICANT CHANGE UP (ref 11.5–15.5)
IANC: 3.12 K/UL — SIGNIFICANT CHANGE UP (ref 1.5–8.5)
IMM GRANULOCYTES NFR BLD AUTO: 0.3 % — SIGNIFICANT CHANGE UP (ref 0–1.5)
LYMPHOCYTES # BLD AUTO: 2.51 K/UL — SIGNIFICANT CHANGE UP (ref 1–3.3)
LYMPHOCYTES # BLD AUTO: 38.9 % — SIGNIFICANT CHANGE UP (ref 13–44)
MCHC RBC-ENTMCNC: 27.9 PG — SIGNIFICANT CHANGE UP (ref 27–34)
MCHC RBC-ENTMCNC: 31.8 GM/DL — LOW (ref 32–36)
MCV RBC AUTO: 87.8 FL — SIGNIFICANT CHANGE UP (ref 80–100)
MONOCYTES # BLD AUTO: 0.55 K/UL — SIGNIFICANT CHANGE UP (ref 0–0.9)
MONOCYTES NFR BLD AUTO: 8.5 % — SIGNIFICANT CHANGE UP (ref 2–14)
NEUTROPHILS # BLD AUTO: 3.12 K/UL — SIGNIFICANT CHANGE UP (ref 1.8–7.4)
NEUTROPHILS NFR BLD AUTO: 48.4 % — SIGNIFICANT CHANGE UP (ref 43–77)
NRBC # BLD: 0 /100 WBCS — SIGNIFICANT CHANGE UP
NRBC # FLD: 0 K/UL — SIGNIFICANT CHANGE UP
NT-PROBNP SERPL-SCNC: 36 PG/ML — SIGNIFICANT CHANGE UP
PLATELET # BLD AUTO: 235 K/UL — SIGNIFICANT CHANGE UP (ref 150–400)
POTASSIUM SERPL-MCNC: 4.5 MMOL/L — SIGNIFICANT CHANGE UP (ref 3.5–5.3)
POTASSIUM SERPL-SCNC: 4.5 MMOL/L — SIGNIFICANT CHANGE UP (ref 3.5–5.3)
PROT SERPL-MCNC: 7.9 G/DL — SIGNIFICANT CHANGE UP (ref 6–8.3)
RBC # BLD: 4.44 M/UL — SIGNIFICANT CHANGE UP (ref 3.8–5.2)
RBC # FLD: 14 % — SIGNIFICANT CHANGE UP (ref 10.3–14.5)
RSV RNA NPH QL NAA+NON-PROBE: SIGNIFICANT CHANGE UP
SARS-COV-2 RNA SPEC QL NAA+PROBE: SIGNIFICANT CHANGE UP
SODIUM SERPL-SCNC: 141 MMOL/L — SIGNIFICANT CHANGE UP (ref 135–145)
TROPONIN T, HIGH SENSITIVITY RESULT: 7 NG/L — SIGNIFICANT CHANGE UP
TROPONIN T, HIGH SENSITIVITY RESULT: 7 NG/L — SIGNIFICANT CHANGE UP
WBC # BLD: 6.45 K/UL — SIGNIFICANT CHANGE UP (ref 3.8–10.5)
WBC # FLD AUTO: 6.45 K/UL — SIGNIFICANT CHANGE UP (ref 3.8–10.5)

## 2022-03-23 PROCEDURE — 71046 X-RAY EXAM CHEST 2 VIEWS: CPT | Mod: 26

## 2022-03-23 PROCEDURE — 99223 1ST HOSP IP/OBS HIGH 75: CPT

## 2022-03-23 PROCEDURE — 99285 EMERGENCY DEPT VISIT HI MDM: CPT

## 2022-03-23 RX ORDER — LIDOCAINE 4 G/100G
10 CREAM TOPICAL ONCE
Refills: 0 | Status: COMPLETED | OUTPATIENT
Start: 2022-03-23 | End: 2022-03-23

## 2022-03-23 RX ORDER — MONTELUKAST 4 MG/1
10 TABLET, CHEWABLE ORAL DAILY
Refills: 0 | Status: DISCONTINUED | OUTPATIENT
Start: 2022-03-23 | End: 2022-03-29

## 2022-03-23 RX ORDER — FAMOTIDINE 10 MG/ML
20 INJECTION INTRAVENOUS ONCE
Refills: 0 | Status: COMPLETED | OUTPATIENT
Start: 2022-03-23 | End: 2022-03-23

## 2022-03-23 RX ORDER — SIMETHICONE 80 MG/1
80 TABLET, CHEWABLE ORAL EVERY 6 HOURS
Refills: 0 | Status: DISCONTINUED | OUTPATIENT
Start: 2022-03-23 | End: 2022-03-29

## 2022-03-23 RX ORDER — DEXTROSE 50 % IN WATER 50 %
15 SYRINGE (ML) INTRAVENOUS ONCE
Refills: 0 | Status: DISCONTINUED | OUTPATIENT
Start: 2022-03-23 | End: 2022-03-29

## 2022-03-23 RX ORDER — SODIUM CHLORIDE 9 MG/ML
1000 INJECTION, SOLUTION INTRAVENOUS
Refills: 0 | Status: DISCONTINUED | OUTPATIENT
Start: 2022-03-23 | End: 2022-03-29

## 2022-03-23 RX ORDER — DEXTROSE 50 % IN WATER 50 %
25 SYRINGE (ML) INTRAVENOUS ONCE
Refills: 0 | Status: DISCONTINUED | OUTPATIENT
Start: 2022-03-23 | End: 2022-03-29

## 2022-03-23 RX ORDER — LIDOCAINE 4 G/100G
1 CREAM TOPICAL EVERY 24 HOURS
Refills: 0 | Status: DISCONTINUED | OUTPATIENT
Start: 2022-03-23 | End: 2022-03-29

## 2022-03-23 RX ORDER — ALLOPURINOL 300 MG
300 TABLET ORAL DAILY
Refills: 0 | Status: DISCONTINUED | OUTPATIENT
Start: 2022-03-23 | End: 2022-03-29

## 2022-03-23 RX ORDER — INSULIN LISPRO 100/ML
VIAL (ML) SUBCUTANEOUS AT BEDTIME
Refills: 0 | Status: DISCONTINUED | OUTPATIENT
Start: 2022-03-23 | End: 2022-03-27

## 2022-03-23 RX ORDER — SIMVASTATIN 20 MG/1
40 TABLET, FILM COATED ORAL AT BEDTIME
Refills: 0 | Status: DISCONTINUED | OUTPATIENT
Start: 2022-03-23 | End: 2022-03-29

## 2022-03-23 RX ORDER — ALBUTEROL 90 UG/1
2 AEROSOL, METERED ORAL EVERY 6 HOURS
Refills: 0 | Status: DISCONTINUED | OUTPATIENT
Start: 2022-03-23 | End: 2022-03-29

## 2022-03-23 RX ORDER — ASPIRIN/CALCIUM CARB/MAGNESIUM 324 MG
324 TABLET ORAL ONCE
Refills: 0 | Status: COMPLETED | OUTPATIENT
Start: 2022-03-23 | End: 2022-03-23

## 2022-03-23 RX ORDER — GLUCAGON INJECTION, SOLUTION 0.5 MG/.1ML
1 INJECTION, SOLUTION SUBCUTANEOUS ONCE
Refills: 0 | Status: DISCONTINUED | OUTPATIENT
Start: 2022-03-23 | End: 2022-03-29

## 2022-03-23 RX ORDER — GABAPENTIN 400 MG/1
300 CAPSULE ORAL DAILY
Refills: 0 | Status: DISCONTINUED | OUTPATIENT
Start: 2022-03-23 | End: 2022-03-29

## 2022-03-23 RX ORDER — INSULIN LISPRO 100/ML
VIAL (ML) SUBCUTANEOUS
Refills: 0 | Status: DISCONTINUED | OUTPATIENT
Start: 2022-03-23 | End: 2022-03-27

## 2022-03-23 RX ORDER — DEXTROSE 50 % IN WATER 50 %
12.5 SYRINGE (ML) INTRAVENOUS ONCE
Refills: 0 | Status: DISCONTINUED | OUTPATIENT
Start: 2022-03-23 | End: 2022-03-29

## 2022-03-23 RX ORDER — LOSARTAN POTASSIUM 100 MG/1
50 TABLET, FILM COATED ORAL DAILY
Refills: 0 | Status: DISCONTINUED | OUTPATIENT
Start: 2022-03-23 | End: 2022-03-29

## 2022-03-23 RX ORDER — NITROGLYCERIN 6.5 MG
0.4 CAPSULE, EXTENDED RELEASE ORAL
Refills: 0 | Status: DISCONTINUED | OUTPATIENT
Start: 2022-03-23 | End: 2022-03-29

## 2022-03-23 RX ADMIN — SIMETHICONE 80 MILLIGRAM(S): 80 TABLET, CHEWABLE ORAL at 22:48

## 2022-03-23 RX ADMIN — Medication 30 MILLILITER(S): at 15:59

## 2022-03-23 RX ADMIN — LIDOCAINE 1 PATCH: 4 CREAM TOPICAL at 23:02

## 2022-03-23 RX ADMIN — SIMVASTATIN 40 MILLIGRAM(S): 20 TABLET, FILM COATED ORAL at 23:02

## 2022-03-23 RX ADMIN — Medication 0.4 MILLIGRAM(S): at 13:47

## 2022-03-23 RX ADMIN — GABAPENTIN 300 MILLIGRAM(S): 400 CAPSULE ORAL at 23:02

## 2022-03-23 RX ADMIN — Medication 324 MILLIGRAM(S): at 13:47

## 2022-03-23 RX ADMIN — Medication 0.4 MILLIGRAM(S): at 14:07

## 2022-03-23 RX ADMIN — LIDOCAINE 10 MILLILITER(S): 4 CREAM TOPICAL at 15:59

## 2022-03-23 RX ADMIN — FAMOTIDINE 20 MILLIGRAM(S): 10 INJECTION INTRAVENOUS at 16:00

## 2022-03-23 NOTE — ED PROVIDER NOTE - NS ED ROS FT
ROS:  GENERAL: No fever, no chills  EYES: no change in vision  HEENT: no trouble swallowing, no trouble speaking  CARDIAC: as per HPI  PULMONARY: no cough, no shortness of breath  GI: no abdominal pain, no nausea, no vomiting, no diarrhea, no constipation  SKIN: no rashes  NEURO: no headache, no weakness  MSK: No joint pain   All other systems reviewed as negative.

## 2022-03-23 NOTE — H&P ADULT - NSHPSOCIALHISTORY_GEN_ALL_CORE
Employed as a [employer]  Former smoker, quit in 2016 (smoked 1pack every 3-4days)  Occasional alcohol use  Denies illicit drug use

## 2022-03-23 NOTE — ED PROVIDER NOTE - PHYSICAL EXAMINATION
Vital signs reviewed, WNL.   CONSTITUTIONAL: Well-appearing obese; well-nourished; in no apparent distress. Non-toxic appearing.   NEURO: Alert & oriented. Gait steady without assistance. Sensory and motor functions are grossly intact.  PSYCH: Mood appropriate. Thought processes intact.   NECK: Supple  CARD: Regular rate and rhythm, no murmurs. (+) reproducible chest pain to sternum without overlying ecchymosis.   RESP: No accessory muscle use; breath sounds clear and equal bilaterally; no wheezes, rhonchi, or rales     ABD: Soft; non-distended; non-tender.   MUSCULOSKELETAL/EXTREMITIES: FROM in all four extremities; no extremity edema.  SKIN: Warm; dry; no apparent lesions or exudate

## 2022-03-23 NOTE — H&P ADULT - NSICDXPASTSURGICALHX_GEN_ALL_CORE_FT
PAST SURGICAL HISTORY:  Fibroid uterus      PAST SURGICAL HISTORY:  Fibroid uterus removed    Ovarian cyst removed

## 2022-03-23 NOTE — H&P ADULT - NSHPREVIEWOFSYSTEMS_GEN_ALL_CORE
REVIEW OF SYSTEMS:    CONSTITUTIONAL: No weakness, fevers or chills  EYES/ENT: No visual changes, wears corrective lenses; No dysphagia; No sore throat; No rhinorrhea; No sinus pain/pressure  NECK: No pain or stiffness  RESPIRATORY: No cough, wheezing, hemoptysis; No shortness of breath  CARDIOVASCULAR: (+) chest pain; No palpitations; No lower extremity edema  GASTROINTESTINAL: No abdominal or epigastric pain. No nausea, vomiting, or hematemesis; No diarrhea or constipation. No melena or hematochezia. Denies reflux/GERD presently.  GENITOURINARY: No dysuria, frequency or hematuria  NEUROLOGICAL: No paresthesias or weakness; reports chronic numbness 2-4th digits of L hand x months  MSK: ambulates without aid, no falls/trauma   SKIN: No itching, burning, rashes, or lesions   All other review of systems is negative unless indicated above.

## 2022-03-23 NOTE — H&P ADULT - PROBLEM SELECTOR PLAN 1
unrelieved with nitro, constant, reproducible, unclear etiology  trop negative x2, check 3rd set in AM  d-dimer negative  CXR as above  reportedly with negative stress/echo last spring  will reorder for echo  further stress testing per cards in AM  simethicone and lidocaine patch PRN ordered unrelieved with nitro, constant, reproducible, unclear etiology  s/p ASA load in ED, will defer to cards in AM regarding continuing   FLP, A1c, TSH with AM labs  trop negative x2, check 3rd set in AM  d-dimer negative  CXR as above  reportedly with negative stress/echo last spring  will reorder for echo  further stress testing per cards in AM  simethicone and lidocaine patch PRN ordered

## 2022-03-23 NOTE — ED ADULT NURSE NOTE - OBJECTIVE STATEMENT
Pt AOX4 c/o midsternal chest pain, denies SOB, states pain worse on inspiration; no Hx of blood clots; pain kept her awake last night; breathing regular and unlabored; sinus rhythm on monitor; labs drawn and sent, 20G Left AC; skin dry warm and intact; Pt has Hx of DM type 2; glu 129; pt also has Hx of heart burn.

## 2022-03-23 NOTE — H&P ADULT - NSHPLABSRESULTS_GEN_ALL_CORE
12.4   6.45  )-----------( 235      ( 23 Mar 2022 14:06 )             39.0     03-23    141  |  104  |  25<H>  ----------------------------<  129<H>  4.5   |  24  |  0.88    Ca    10.5      23 Mar 2022 14:06    TPro  7.9  /  Alb  4.6  /  TBili  0.5  /  DBili  x   /  AST  16  /  ALT  17  /  AlkPhos  122<H>  03-23    D-Dimer Assay, Quantitative: <150 ng/mL DDU (03.23.22 @ 14:06)    Troponin T, High Sensitivity Result: 7 ng/L (03.23.22 @ 16:06)  Troponin T, High Sensitivity Result: 7 ng/L (03.23.22 @ 14:06)    Serum Pro-Brain Natriuretic Peptide: 36 pg/mL (03.23.22 @ 14:06)    Flu With COVID-19 By GOYO (03.23.22 @ 14:07)    SARS-CoV-2 Result: NotDetec    Influenza A Result: NotDetec    Influenza B Result: NotDetec    Resp Syn Virus Result: NotDetec    < from: Xray Chest 2 Views PA/Lat (03.23.22 @ 14:36) >  Examination in frontal and lateral projection fails to show evidence of any active pulmonary disease.  The heart is not enlarged and there is no pleural effusion or pneumothorax.  There is no acute bone pathology.  COMPARISON: July 13, 2016  IMPRESSION: No acute cardiopulmonary pathology.  < end of copied text >    EKG personally reviewed - 69bpm NSR, PAC, QTc 407ms

## 2022-03-23 NOTE — ED ADULT NURSE REASSESSMENT NOTE - NS ED NURSE REASSESS COMMENT FT1
Report received from deanna RN. Patient A&Ox4, resting in stretcher, respirations even and unlabored. Patient remains on cardiac monitor-NS. Vitals stable. . 20G IV observed to left antecubital, intact and patent. Report given to CDU RN. Report received from deanna RN. Patient A&Ox4, resting in stretcher, respirations even and unlabored. Patient remains on cardiac monitor-NS. Pt c/o midsternal cp pain. MD made Kacey aware. Will see patient Vitals stable. . 20G IV observed to left antecubital, intact and patent. Report given to CDU RN.

## 2022-03-23 NOTE — H&P ADULT - NSICDXFAMILYHX_GEN_ALL_CORE_FT
FAMILY HISTORY:  Father  Still living? Unknown  Family history of diabetes mellitus (DM), Age at diagnosis: Age Unknown  FH: HTN (hypertension), Age at diagnosis: Age Unknown     FAMILY HISTORY:  Father  Still living? Unknown  Family history of diabetes mellitus (DM), Age at diagnosis: Age Unknown  FH: HTN (hypertension), Age at diagnosis: Age Unknown    Mother  Still living? Unknown  Family history of DVT, Age at diagnosis: Age Unknown    Sibling  Still living? Unknown  Family history of DVT, Age at diagnosis: Age Unknown

## 2022-03-23 NOTE — ED PROVIDER NOTE - NS ED ATTENDING STATEMENT MOD
This was a shared visit with the BRENDA. I reviewed and verified the documentation and independently performed the documented:

## 2022-03-23 NOTE — ED PROVIDER NOTE - ATTENDING CONTRIBUTION TO CARE
DR. NAGY, ATTENDING MD-  I personally saw the patient with the PA and performed a substantive portion of the visit including all aspects of the medical decision making.    60 y/o female h/o htn hld dm obesity p/w cp x2 days.  Last stress 1 year prior.  Has o/p cards.  Concern for acs.  Obtain ekg cbc bmp trop cxr covid swab admit for further care and evaluation.

## 2022-03-23 NOTE — H&P ADULT - HISTORY OF PRESENT ILLNESS
61-year-old female with asthma, HLD, HTN, DM2, obesity, presenting from [facility] with chest pain.    In the ED VS: 98  64-78  123-139/56-87  13-18  %RA, received ASA 324mg PO x1, aluminum hydroxide/magnesium hydroxide/simethicone 30mL PO x1, famotidine 20mg IV x1, lidocaine 2% viscous 10mL swish and spit, nitroglycerin 0.4mg SL x1 61-year-old female with asthma, HLD, HTN, DM2, gout, obesity, presenting with 2-3 days of chest pain. Patient cannot recall any inciting incident when pain started however notes it has been constant 7-8/10 in severity, non-radiating, substernal, described "as a ball." Pain was keeping patient from sleeping last night. Pain is occasionally worse with taking a deep breath. She reports no history of similar pain in past. She took acetaminophen for pain at home without relief. She reports no relief with medications received in the ED. She does not take ASA, hormones, denies any recent travel. No history of DVT/PE, no recent travel. No recent changes in diet. She reports occasional sharp pain to proximal to R shoulder posteriorly with movement.    In the ED VS: 98  64-78  123-139/56-87  13-18  %RA, received ASA 324mg PO x1, aluminum hydroxide/magnesium hydroxide/simethicone 30mL PO x1, famotidine 20mg IV x1, lidocaine 2% viscous 10mL swish and spit, nitroglycerin 0.4mg SL x1

## 2022-03-23 NOTE — ED ADULT NURSE NOTE - NSHOSCREENINGQ1_ED_ALL_ED
Physical Therapy     Referred by: Romel Delgado DO; Medical Diagnosis (from order):    Diagnosis Information      Diagnosis    V58.89, 840.8 (ICD-9-CM) - S46.112D (ICD-10-CM) - Labral tear of long head of left biceps tendon, subsequent encounter                Daily Treatment Note    Visit:  8     SUBJECTIVE                                                                                                             Has used the pulleys, CPM (maxed out on range at this time), and isometrics shoulder is feeling good again today. States that he feels that he is getting a better stretch with pulleys than CPM at this time.     4 weeks post op on 3/23    Pain / Symptoms:  Pain rating (out of 10): Current: 0     OBJECTIVE                                                                                                                     Range of Motion (ROM)   (degrees unless noted; active unless noted; norms in ( ); negative=lacking to 0, positive=beyond 0)   Shoulder:     - Flexion (180):        • Left: Passive: 145     - Abduction (180):        • Left: Passive: 120 pain     - Internal Rotation at 45°:         • Left: Passive: 72    - External Rotation at 45°:         • Left: Passive: 30 pain      TREATMENT                                                                                                                  Therapeutic Exercise:  PROM L shoulder all planes within tolerance; most pain with abduction to 120º and ER to 30º  Supine AAROM shoulder flexion with opposite arm assist x 5  *Supine AAROM shoulder flexion with dowel x10  *Seated AAROM shoulder abduction on table with dowel assist x 10  *Seated AAROM shoulder flexion on table with opposite arm assist x 10  *Seated AAROM shoulder ER on table with dowel assist x 10  *Seated scapular retractions x10  Supine and seated AROM elbow flexion x10 each     HELD TODAY:  *Submaximal isometrics, extension, abduction, IR and ER 10 x 10 sec holds - performed sitting  No with forearm resting on table to decrease activation of biceps   *Sitting pulleys into flexion (tolerated about 100º), scaption (tolerated about 100º) and abduction (max 90º) x 10 each         Manual Therapy:  Grade II glenohumeral posterior and inferior joint mobilizations and acromioclavicular posterior mobilizations for pain      Skilled input: verbal instruction/cues  education/instruction on: 4 week MD guidelines    Writer verbally educated and received verbal consent for hand placement, positioning of patient, and techniques to be performed today from patient for therapist position for techniques and hand placement and palpation for techniques as described above and how they are pertinent to the patient's plan of care.    Home Exercise Program: *above indicates provided as part of home exercise program     ASSESSMENT                                                                                                             Progressed PROM this session to patients tolerance with flexion (145º) and IR (72º) tolerated much better than abduction (120º) and ER (30º) as both motions created moderate to significant shoulder pain.  Patient did state that he had the CPM maxed out at home and does not feel like he is getting any benefit from CPM at this point without pain.  Poor tolerance to passive end range stretching in abduction and external rotation exhibited today.  Will continue to monitor progression both planes next session.   Pain/symptoms after session: 1    Patient Education:   Results of above outlined education: Verbalizes understanding and Needs reinforcement    Patient progress, plan of care and goals discussed face to face between providing therapist and assistant.           Therapy procedure time and total treatment time can be found documented on the Time Entry flowsheet

## 2022-03-23 NOTE — ED PROVIDER NOTE - OBJECTIVE STATEMENT
60 y/o female with hx of HTN, DM, HLD, and obesity presents c/o chest pain x 2-3 days that is worse with deep breaths and hurts to touch. Pt denies DRAPER, SOB, abd pain, dizziness, leg swelling, NVD. Pt notes today the pain seems to be worse so she went to [facility] who sent her here for evaluation. Denies fever, chills, cough, hx of DVT/PE. of note, pt states her calves sometimes feel sore but it resolves on its own. Pt notes she has stress and echo testing yearly - last work-up ~1 year ago. She does not take ASA daily.

## 2022-03-23 NOTE — ED PROVIDER NOTE - PROGRESS NOTE DETAILS
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 Pt understood agreeable to admission for further evaluation considering persistent CP. Rubén Landon D.O., PGY3 (Resident)  Patient signed out to me. Hemodyanmically stable. Discussed with cards. Admit/.

## 2022-03-23 NOTE — H&P ADULT - NSHPPHYSICALEXAM_GEN_ALL_CORE
Vital Signs Last 24 Hrs  T(C): 36.5 (23 Mar 2022 21:06), Max: 36.7 (23 Mar 2022 12:21)  T(F): 97.7 (23 Mar 2022 21:06), Max: 98 (23 Mar 2022 12:21)  HR: 79 (23 Mar 2022 21:06) (64 - 79)  BP: 130/62 (23 Mar 2022 21:06) (123/69 - 139/87)  BP(mean): --  RR: 18 (23 Mar 2022 21:06) (13 - 18)  SpO2: 100% (23 Mar 2022 21:06) (99% - 100%) Vital Signs Last 24 Hrs  T(C): 36.5 (23 Mar 2022 21:06), Max: 36.7 (23 Mar 2022 12:21)  T(F): 97.7 (23 Mar 2022 21:06), Max: 98 (23 Mar 2022 12:21)  HR: 79 (23 Mar 2022 21:06) (64 - 79)  BP: 130/62 (23 Mar 2022 21:06) (123/69 - 139/87)  BP(mean): --  RR: 18 (23 Mar 2022 21:06) (13 - 18)  SpO2: 100% (23 Mar 2022 21:06) (99% - 100%)    PHYSICAL EXAM:  GENERAL: NAD, well-developed, well-nourished  HEAD:  Atraumatic, Normocephalic  EYES: PERRL, conjunctiva and sclera clear  NECK: Supple, No JVD  CHEST/LUNG: Clear to auscultation bilaterally; No wheezes, rales or rhonchi; normal work of breathing, speaking in full sentences  HEART: Regular rate and rhythm; No murmurs, rubs, or gallops, (+)S1, S2; (+)reproducible TTP sternum   ABDOMEN: Obese, Soft, Nontender, Nondistended; Normal Bowel sounds   BACK: palpation medial to R shoulder reproduces pain  EXTREMITIES:  2+ Peripheral Pulses, No clubbing, cyanosis, or edema  PSYCH: normal mood and affect, A&Ox3  NEUROLOGY: no focal neuro deficits  SKIN: No rashes or lesions

## 2022-03-23 NOTE — ED PROVIDER NOTE - CLINICAL SUMMARY MEDICAL DECISION MAKING FREE TEXT BOX
62 y/o female with hx of HTN, DM, HLD, and obesity presents c/o chest pain x 2-3 days that is worse with deep breaths and hurts to touch. Will assess for acute chest with EKG, labs, CXR, Nitro/ASA. Dispo pending.

## 2022-03-23 NOTE — H&P ADULT - ASSESSMENT
61-year-old female with asthma, HLD, HTN, DM2, gout, obesity, presenting with 2-3 days of constant chest pain.

## 2022-03-24 LAB
A1C WITH ESTIMATED AVERAGE GLUCOSE RESULT: 7.8 % — HIGH (ref 4–5.6)
ANION GAP SERPL CALC-SCNC: 13 MMOL/L — SIGNIFICANT CHANGE UP (ref 7–14)
BUN SERPL-MCNC: 20 MG/DL — SIGNIFICANT CHANGE UP (ref 7–23)
CALCIUM SERPL-MCNC: 10 MG/DL — SIGNIFICANT CHANGE UP (ref 8.4–10.5)
CHLORIDE SERPL-SCNC: 104 MMOL/L — SIGNIFICANT CHANGE UP (ref 98–107)
CHOLEST SERPL-MCNC: 176 MG/DL — SIGNIFICANT CHANGE UP
CO2 SERPL-SCNC: 23 MMOL/L — SIGNIFICANT CHANGE UP (ref 22–31)
CREAT SERPL-MCNC: 0.84 MG/DL — SIGNIFICANT CHANGE UP (ref 0.5–1.3)
EGFR: 80 ML/MIN/1.73M2 — SIGNIFICANT CHANGE UP
ESTIMATED AVERAGE GLUCOSE: 177 — SIGNIFICANT CHANGE UP
GLUCOSE BLDC GLUCOMTR-MCNC: 135 MG/DL — HIGH (ref 70–99)
GLUCOSE BLDC GLUCOMTR-MCNC: 146 MG/DL — HIGH (ref 70–99)
GLUCOSE BLDC GLUCOMTR-MCNC: 158 MG/DL — HIGH (ref 70–99)
GLUCOSE BLDC GLUCOMTR-MCNC: 168 MG/DL — HIGH (ref 70–99)
GLUCOSE BLDC GLUCOMTR-MCNC: 95 MG/DL — SIGNIFICANT CHANGE UP (ref 70–99)
GLUCOSE SERPL-MCNC: 153 MG/DL — HIGH (ref 70–99)
HCT VFR BLD CALC: 37.7 % — SIGNIFICANT CHANGE UP (ref 34.5–45)
HCV AB S/CO SERPL IA: 0.12 S/CO — SIGNIFICANT CHANGE UP (ref 0–0.99)
HCV AB SERPL-IMP: SIGNIFICANT CHANGE UP
HDLC SERPL-MCNC: 34 MG/DL — LOW
HGB BLD-MCNC: 11.7 G/DL — SIGNIFICANT CHANGE UP (ref 11.5–15.5)
LIPID PNL WITH DIRECT LDL SERPL: 101 MG/DL — HIGH
MAGNESIUM SERPL-MCNC: 1.8 MG/DL — SIGNIFICANT CHANGE UP (ref 1.6–2.6)
MCHC RBC-ENTMCNC: 27.5 PG — SIGNIFICANT CHANGE UP (ref 27–34)
MCHC RBC-ENTMCNC: 31 GM/DL — LOW (ref 32–36)
MCV RBC AUTO: 88.7 FL — SIGNIFICANT CHANGE UP (ref 80–100)
NON HDL CHOLESTEROL: 142 MG/DL — HIGH
NRBC # BLD: 0 /100 WBCS — SIGNIFICANT CHANGE UP
NRBC # FLD: 0 K/UL — SIGNIFICANT CHANGE UP
PHOSPHATE SERPL-MCNC: 3.6 MG/DL — SIGNIFICANT CHANGE UP (ref 2.5–4.5)
PLATELET # BLD AUTO: 208 K/UL — SIGNIFICANT CHANGE UP (ref 150–400)
POTASSIUM SERPL-MCNC: 4.3 MMOL/L — SIGNIFICANT CHANGE UP (ref 3.5–5.3)
POTASSIUM SERPL-SCNC: 4.3 MMOL/L — SIGNIFICANT CHANGE UP (ref 3.5–5.3)
RBC # BLD: 4.25 M/UL — SIGNIFICANT CHANGE UP (ref 3.8–5.2)
RBC # FLD: 14 % — SIGNIFICANT CHANGE UP (ref 10.3–14.5)
SODIUM SERPL-SCNC: 140 MMOL/L — SIGNIFICANT CHANGE UP (ref 135–145)
TRIGL SERPL-MCNC: 204 MG/DL — HIGH
TROPONIN T, HIGH SENSITIVITY RESULT: 8 NG/L — SIGNIFICANT CHANGE UP
TSH SERPL-MCNC: 0.95 UIU/ML — SIGNIFICANT CHANGE UP (ref 0.27–4.2)
WBC # BLD: 6.23 K/UL — SIGNIFICANT CHANGE UP (ref 3.8–10.5)
WBC # FLD AUTO: 6.23 K/UL — SIGNIFICANT CHANGE UP (ref 3.8–10.5)

## 2022-03-24 PROCEDURE — 93306 TTE W/DOPPLER COMPLETE: CPT | Mod: 26

## 2022-03-24 PROCEDURE — 78452 HT MUSCLE IMAGE SPECT MULT: CPT | Mod: 26

## 2022-03-24 PROCEDURE — 93018 CV STRESS TEST I&R ONLY: CPT | Mod: GC

## 2022-03-24 PROCEDURE — 93016 CV STRESS TEST SUPVJ ONLY: CPT | Mod: GC

## 2022-03-24 RX ORDER — HEPARIN SODIUM 5000 [USP'U]/ML
5000 INJECTION INTRAVENOUS; SUBCUTANEOUS EVERY 8 HOURS
Refills: 0 | Status: DISCONTINUED | OUTPATIENT
Start: 2022-03-24 | End: 2022-03-29

## 2022-03-24 RX ADMIN — LOSARTAN POTASSIUM 50 MILLIGRAM(S): 100 TABLET, FILM COATED ORAL at 05:24

## 2022-03-24 RX ADMIN — MONTELUKAST 10 MILLIGRAM(S): 4 TABLET, CHEWABLE ORAL at 12:04

## 2022-03-24 RX ADMIN — LIDOCAINE 1 PATCH: 4 CREAM TOPICAL at 07:16

## 2022-03-24 RX ADMIN — SIMVASTATIN 40 MILLIGRAM(S): 20 TABLET, FILM COATED ORAL at 21:38

## 2022-03-24 RX ADMIN — GABAPENTIN 300 MILLIGRAM(S): 400 CAPSULE ORAL at 12:04

## 2022-03-24 RX ADMIN — LIDOCAINE 1 PATCH: 4 CREAM TOPICAL at 12:22

## 2022-03-24 RX ADMIN — Medication 300 MILLIGRAM(S): at 12:04

## 2022-03-24 NOTE — CONSULT NOTE ADULT - SUBJECTIVE AND OBJECTIVE BOX
date of service 3/24/22    HISTORY OF PRESENT ILLNESS: HPI:  61-year-old female with asthma, HLD, HTN, DM2, gout, obesity, presenting with 2-3 days of chest pain. Patient cannot recall any inciting incident when pain started however notes it has been constant 7-8/10 in severity, non-radiating, substernal, described "as a ball." Pain was keeping patient from sleeping last night. Pain is occasionally worse with taking a deep breath. She reports no history of similar pain in past. She took acetaminophen for pain at home without relief. She reports no relief with medications received in the ED. She does not take ASA, hormones, denies any recent travel. No history of DVT/PE, no recent travel. No recent changes in diet. She reports occasional sharp pain to proximal to R shoulder posteriorly with movement.    Pt reports normal stress test 1 year ago.  No hx CAD, MI, Arrhythmias.  Reports known Hiatal Hernia and has upcoming GI eval next month.    PAST MEDICAL & SURGICAL HISTORY:  Asthma    HTN (hypertension)    HLD (hyperlipidemia)    Diabetes    Fibroid uterus  removed    Ovarian cyst  removed      MEDICATIONS  (STANDING):  allopurinol 300 milliGRAM(s) Oral daily  dextrose 40% Gel 15 Gram(s) Oral once  dextrose 5%. 1000 milliLiter(s) (50 mL/Hr) IV Continuous <Continuous>  dextrose 5%. 1000 milliLiter(s) (100 mL/Hr) IV Continuous <Continuous>  dextrose 50% Injectable 25 Gram(s) IV Push once  dextrose 50% Injectable 12.5 Gram(s) IV Push once  dextrose 50% Injectable 25 Gram(s) IV Push once  gabapentin 300 milliGRAM(s) Oral daily  glucagon  Injectable 1 milliGRAM(s) IntraMuscular once  heparin   Injectable 5000 Unit(s) SubCutaneous every 8 hours  insulin lispro (ADMELOG) corrective regimen sliding scale   SubCutaneous three times a day before meals  insulin lispro (ADMELOG) corrective regimen sliding scale   SubCutaneous at bedtime  losartan 50 milliGRAM(s) Oral daily  montelukast 10 milliGRAM(s) Oral daily  simvastatin 40 milliGRAM(s) Oral at bedtime    Allergies  No Known Allergies      FAMILY HISTORY:  FH: HTN (hypertension) (Father)    Family history of diabetes mellitus (DM) (Father)    Family history of DVT (Mother, Sibling)    Non-contributary for premature coronary disease or sudden cardiac death    SOCIAL HISTORY:    [ x] Non-smoker  [ ] Smoker  [ ] Alcohol    FLU VACCINE THIS YEAR STARTS IN AUGUST:  [ ] Yes    [ ] No    IF OVER 65 HAVE YOU EVER HAD A PNA VACCINE:  [ ] Yes    [ ] No       [ ] N/A      REVIEW OF SYSTEMS:  [x ]chest pain  [  ]shortness of breath  [  ]palpitations  [  ]syncope  [ ]near syncope [ ]upper extremity weakness   [ ] lower extremity weakness  [  ]diplopia  [  ]altered mental status   [  ]fevers  [ ]chills [ ]nausea  [ ]vomiting  [  ]dysphagia    [ ]abdominal pain  [ ]melena  [ ]BRBPR    [  ]epistaxis  [  ]rash    [ ]lower extremity edema        [x ] All others negative	  [ ] Unable to obtain      LABS:	 	    CARDIAC MARKERS:  TROP T 7, 7, 8                        11.7   6.23  )-----------( 208      ( 24 Mar 2022 06:53 )             37.7     Hb Trend: 11.7<--, 12.4<--    03-24    140  |  104  |  20  ----------------------------<  153<H>  4.3   |  23  |  0.84    Ca    10.0      24 Mar 2022 06:53  Phos  3.6     03-24  Mg     1.80     03-24    TPro  7.9  /  Alb  4.6  /  TBili  0.5  /  DBili  x   /  AST  16  /  ALT  17  /  AlkPhos  122<H>  03-23    Creatinine Trend: 0.84<--, 0.88<--  proBNP: Serum Pro-Brain Natriuretic Peptide: 36 pg/mL (03-23 @ 14:06)    TSH: Thyroid Stimulating Hormone, Serum: 0.95 uIU/mL (03-24 @ 06:53)    PHYSICAL EXAM:  T(C): 36.5 (03-24-22 @ 12:12), Max: 36.7 (03-23-22 @ 19:39)  HR: 83 (03-24-22 @ 12:12) (64 - 89)  BP: 128/68 (03-24-22 @ 12:12) (108/61 - 139/87)  RR: 18 (03-24-22 @ 12:12) (13 - 19)  SpO2: 98% (03-24-22 @ 12:12) (98% - 100%)  Wt(kg): --   BMI (kg/m2): 46.4 (03-24-22 @ 00:48)  I&O's Summary      Gen: Appears well in NAD  HEENT:  (-)icterus (-)pallor  CV: N S1 S2 1/6 SATYA (+)2 Pulses B/l  Resp:  Clear to auscultation B/L, normal effort  GI: (+) BS Soft, NT, ND  Lymph:  (-)Edema, (-)obvious lymphadenopathy  Skin: Warm to touch, Normal turgor  Psych: Appropriate mood and affect    TELEMETRY: SR	      ECG:  	NSR    < from: Transthoracic Echocardiogram (03.24.22 @ 09:02) >  ------------------------------------------------------------------------  CONCLUSIONS:  1. Normal mitral valve. Minimal mitral regurgitation.  2. Normal left ventricular internal dimensions and wall  thicknesses.  3. Normal left ventricular systolic function. No segmental  wall motion abnormalities.  4. Mild diastolic dysfunction (Stage I).  5. Normal right ventricular size and function.  ------------------------------------------------------------------------  Confirmed on  3/24/2022 - 10:45:03 by Russell Reyes M.D.,  [facility], [facility]    < end of copied text >      ASSESSMENT/PLAN:  61-year-old female with asthma, HLD, HTN, DM2, gout, obesity, presenting with 2-3 days of chest pain.    --ACS ruled out with serial CE  --TTE with structurally normal heart  --await NST results  --if no ischemia/infarct, plan for DC home and close OP F/U with OP Cardio and OP GI

## 2022-03-24 NOTE — PATIENT PROFILE ADULT - FALL HARM RISK - HARM RISK INTERVENTIONS

## 2022-03-24 NOTE — CONSULT NOTE ADULT - NS ATTEND AMEND GEN_ALL_CORE FT
seen and agree w/ PA.  echo reassuring.  stress test results pending.  has hx of hiatal hernia, but CT on this admission shows no excursion of the stomach into the thorax.  outpatient followup- she has her own outside cardiologist and GI physician.

## 2022-03-25 LAB
ANION GAP SERPL CALC-SCNC: 15 MMOL/L — HIGH (ref 7–14)
BUN SERPL-MCNC: 20 MG/DL — SIGNIFICANT CHANGE UP (ref 7–23)
CALCIUM SERPL-MCNC: 10.3 MG/DL — SIGNIFICANT CHANGE UP (ref 8.4–10.5)
CHLORIDE SERPL-SCNC: 105 MMOL/L — SIGNIFICANT CHANGE UP (ref 98–107)
CO2 SERPL-SCNC: 22 MMOL/L — SIGNIFICANT CHANGE UP (ref 22–31)
CREAT SERPL-MCNC: 0.84 MG/DL — SIGNIFICANT CHANGE UP (ref 0.5–1.3)
EGFR: 80 ML/MIN/1.73M2 — SIGNIFICANT CHANGE UP
GLUCOSE BLDC GLUCOMTR-MCNC: 107 MG/DL — HIGH (ref 70–99)
GLUCOSE BLDC GLUCOMTR-MCNC: 126 MG/DL — HIGH (ref 70–99)
GLUCOSE BLDC GLUCOMTR-MCNC: 153 MG/DL — HIGH (ref 70–99)
GLUCOSE BLDC GLUCOMTR-MCNC: 199 MG/DL — HIGH (ref 70–99)
GLUCOSE SERPL-MCNC: 159 MG/DL — HIGH (ref 70–99)
HCT VFR BLD CALC: 38.2 % — SIGNIFICANT CHANGE UP (ref 34.5–45)
HGB BLD-MCNC: 12.3 G/DL — SIGNIFICANT CHANGE UP (ref 11.5–15.5)
MAGNESIUM SERPL-MCNC: 2 MG/DL — SIGNIFICANT CHANGE UP (ref 1.6–2.6)
MCHC RBC-ENTMCNC: 28.7 PG — SIGNIFICANT CHANGE UP (ref 27–34)
MCHC RBC-ENTMCNC: 32.2 GM/DL — SIGNIFICANT CHANGE UP (ref 32–36)
MCV RBC AUTO: 89 FL — SIGNIFICANT CHANGE UP (ref 80–100)
NRBC # BLD: 0 /100 WBCS — SIGNIFICANT CHANGE UP
NRBC # FLD: 0 K/UL — SIGNIFICANT CHANGE UP
PHOSPHATE SERPL-MCNC: 3.4 MG/DL — SIGNIFICANT CHANGE UP (ref 2.5–4.5)
PLATELET # BLD AUTO: 219 K/UL — SIGNIFICANT CHANGE UP (ref 150–400)
POTASSIUM SERPL-MCNC: 4.7 MMOL/L — SIGNIFICANT CHANGE UP (ref 3.5–5.3)
POTASSIUM SERPL-SCNC: 4.7 MMOL/L — SIGNIFICANT CHANGE UP (ref 3.5–5.3)
RBC # BLD: 4.29 M/UL — SIGNIFICANT CHANGE UP (ref 3.8–5.2)
RBC # FLD: 14 % — SIGNIFICANT CHANGE UP (ref 10.3–14.5)
SODIUM SERPL-SCNC: 142 MMOL/L — SIGNIFICANT CHANGE UP (ref 135–145)
WBC # BLD: 6.18 K/UL — SIGNIFICANT CHANGE UP (ref 3.8–10.5)
WBC # FLD AUTO: 6.18 K/UL — SIGNIFICANT CHANGE UP (ref 3.8–10.5)

## 2022-03-25 RX ORDER — CALCIUM CARBONATE 500(1250)
2 TABLET ORAL EVERY 6 HOURS
Refills: 0 | Status: DISCONTINUED | OUTPATIENT
Start: 2022-03-25 | End: 2022-03-29

## 2022-03-25 RX ADMIN — Medication 300 MILLIGRAM(S): at 12:13

## 2022-03-25 RX ADMIN — SIMVASTATIN 40 MILLIGRAM(S): 20 TABLET, FILM COATED ORAL at 21:33

## 2022-03-25 RX ADMIN — Medication 2 TABLET(S): at 21:34

## 2022-03-25 RX ADMIN — GABAPENTIN 300 MILLIGRAM(S): 400 CAPSULE ORAL at 12:13

## 2022-03-25 RX ADMIN — LOSARTAN POTASSIUM 50 MILLIGRAM(S): 100 TABLET, FILM COATED ORAL at 05:53

## 2022-03-25 RX ADMIN — Medication 1: at 12:13

## 2022-03-25 RX ADMIN — MONTELUKAST 10 MILLIGRAM(S): 4 TABLET, CHEWABLE ORAL at 12:13

## 2022-03-25 RX ADMIN — Medication 1: at 09:10

## 2022-03-25 NOTE — PROGRESS NOTE ADULT - SUBJECTIVE AND OBJECTIVE BOX
date of service 3/25/22    HISTORY OF PRESENT ILLNESS: HPI:  61-year-old female with asthma, HLD, HTN, DM2, gout, obesity, presenting with 2-3 days of chest pain. Patient cannot recall any inciting incident when pain started however notes it has been constant 7-8/10 in severity, non-radiating, substernal, described "as a ball." Pain was keeping patient from sleeping last night. Pain is occasionally worse with taking a deep breath. She reports no history of similar pain in past. She took acetaminophen for pain at home without relief. She reports no relief with medications received in the ED. She does not take ASA, hormones, denies any recent travel. No history of DVT/PE, no recent travel. No recent changes in diet. She reports occasional sharp pain to proximal to R shoulder posteriorly with movement.    Pt reports normal stress test 1 year ago.  No hx CAD, MI, Arrhythmias.  Reports known Hiatal Hernia and has upcoming GI eval next month.    Getting stress test today, awaiting results.  no new complaints.    ALBUTerol    90 MICROgram(s) HFA Inhaler 2 Puff(s) Inhalation every 6 hours PRN  allopurinol 300 milliGRAM(s) Oral daily  dextrose 40% Gel 15 Gram(s) Oral once  dextrose 5%. 1000 milliLiter(s) IV Continuous <Continuous>  dextrose 5%. 1000 milliLiter(s) IV Continuous <Continuous>  dextrose 50% Injectable 25 Gram(s) IV Push once  dextrose 50% Injectable 12.5 Gram(s) IV Push once  dextrose 50% Injectable 25 Gram(s) IV Push once  gabapentin 300 milliGRAM(s) Oral daily  glucagon  Injectable 1 milliGRAM(s) IntraMuscular once  heparin   Injectable 5000 Unit(s) SubCutaneous every 8 hours  insulin lispro (ADMELOG) corrective regimen sliding scale   SubCutaneous three times a day before meals  insulin lispro (ADMELOG) corrective regimen sliding scale   SubCutaneous at bedtime  lidocaine   4% Patch 1 Patch Transdermal every 24 hours PRN  losartan 50 milliGRAM(s) Oral daily  montelukast 10 milliGRAM(s) Oral daily  nitroglycerin     SubLingual 0.4 milliGRAM(s) SubLingual every 5 minutes PRN  simethicone 80 milliGRAM(s) Chew every 6 hours PRN  simvastatin 40 milliGRAM(s) Oral at bedtime                          12.3   6.18  )-----------( 219      ( 25 Mar 2022 07:23 )             38.2       03-25    142  |  105  |  20  ----------------------------<  159<H>  4.7   |  22  |  0.84    Ca    10.3      25 Mar 2022 07:23  Phos  3.4     03-25  Mg     2.00     03-25    TPro  7.9  /  Alb  4.6  /  TBili  0.5  /  DBili  x   /  AST  16  /  ALT  17  /  AlkPhos  122<H>  03-23    T(C): 36.8 (03-25-22 @ 05:50), Max: 36.8 (03-25-22 @ 05:50)  HR: 65 (03-25-22 @ 05:50) (65 - 83)  BP: 107/64 (03-25-22 @ 05:50) (105/60 - 128/68)  RR: 17 (03-25-22 @ 05:50) (17 - 18)  SpO2: 99% (03-25-22 @ 05:50) (98% - 99%)  Wt(kg): --    I&O's Summary      Gen: Appears well in NAD  HEENT:  (-)icterus (-)pallor  CV: N S1 S2 1/6 SATYA (+)2 Pulses B/l  Resp:  Clear to auscultation B/L, normal effort  GI: (+) BS Soft, NT, ND  Lymph:  (-)Edema, (-)obvious lymphadenopathy  Skin: Warm to touch, Normal turgor  Psych: Appropriate mood and affect    TELEMETRY: SR	      ECG:  	NSR    < from: Transthoracic Echocardiogram (03.24.22 @ 09:02) >  ------------------------------------------------------------------------  CONCLUSIONS:  1. Normal mitral valve. Minimal mitral regurgitation.  2. Normal left ventricular internal dimensions and wall  thicknesses.  3. Normal left ventricular systolic function. No segmental  wall motion abnormalities.  4. Mild diastolic dysfunction (Stage I).  5. Normal right ventricular size and function.  ------------------------------------------------------------------------  Confirmed on  3/24/2022 - 10:45:03 by Russell Reyes M.D.,  [facility], [facility]    < end of copied text >      ASSESSMENT/PLAN:  61-year-old female with asthma, HLD, HTN, DM2, gout, obesity, presenting with 2-3 days of chest pain.    --ACS ruled out with serial CE  --TTE with structurally normal heart  --await NST results  --if no ischemia/infarct, plan for DC home and close OP F/U with OP Cardio and OP GI    Freedom Fontenot M.D.  Cardiac Electrophysiology  [phone]           date of service 3/25/22    HISTORY OF PRESENT ILLNESS: HPI:  61-year-old female with asthma, HLD, HTN, DM2, gout, obesity, presenting with 2-3 days of chest pain. Patient cannot recall any inciting incident when pain started however notes it has been constant 7-8/10 in severity, non-radiating, substernal, described "as a ball." Pain was keeping patient from sleeping last night. Pain is occasionally worse with taking a deep breath. She reports no history of similar pain in past. She took acetaminophen for pain at home without relief. She reports no relief with medications received in the ED. She does not take ASA, hormones, denies any recent travel. No history of DVT/PE, no recent travel. No recent changes in diet. She reports occasional sharp pain to proximal to R shoulder posteriorly with movement.    Pt reports normal stress test 1 year ago.  No hx CAD, MI, Arrhythmias.  Reports known Hiatal Hernia and has upcoming GI eval next month.    Getting stress test today, awaiting results.  no new complaints.    ALBUTerol    90 MICROgram(s) HFA Inhaler 2 Puff(s) Inhalation every 6 hours PRN  allopurinol 300 milliGRAM(s) Oral daily  dextrose 40% Gel 15 Gram(s) Oral once  dextrose 5%. 1000 milliLiter(s) IV Continuous <Continuous>  dextrose 5%. 1000 milliLiter(s) IV Continuous <Continuous>  dextrose 50% Injectable 25 Gram(s) IV Push once  dextrose 50% Injectable 12.5 Gram(s) IV Push once  dextrose 50% Injectable 25 Gram(s) IV Push once  gabapentin 300 milliGRAM(s) Oral daily  glucagon  Injectable 1 milliGRAM(s) IntraMuscular once  heparin   Injectable 5000 Unit(s) SubCutaneous every 8 hours  insulin lispro (ADMELOG) corrective regimen sliding scale   SubCutaneous three times a day before meals  insulin lispro (ADMELOG) corrective regimen sliding scale   SubCutaneous at bedtime  lidocaine   4% Patch 1 Patch Transdermal every 24 hours PRN  losartan 50 milliGRAM(s) Oral daily  montelukast 10 milliGRAM(s) Oral daily  nitroglycerin     SubLingual 0.4 milliGRAM(s) SubLingual every 5 minutes PRN  simethicone 80 milliGRAM(s) Chew every 6 hours PRN  simvastatin 40 milliGRAM(s) Oral at bedtime                          12.3   6.18  )-----------( 219      ( 25 Mar 2022 07:23 )             38.2       03-25    142  |  105  |  20  ----------------------------<  159<H>  4.7   |  22  |  0.84    Ca    10.3      25 Mar 2022 07:23  Phos  3.4     03-25  Mg     2.00     03-25    TPro  7.9  /  Alb  4.6  /  TBili  0.5  /  DBili  x   /  AST  16  /  ALT  17  /  AlkPhos  122<H>  03-23    T(C): 36.8 (03-25-22 @ 05:50), Max: 36.8 (03-25-22 @ 05:50)  HR: 65 (03-25-22 @ 05:50) (65 - 83)  BP: 107/64 (03-25-22 @ 05:50) (105/60 - 128/68)  RR: 17 (03-25-22 @ 05:50) (17 - 18)  SpO2: 99% (03-25-22 @ 05:50) (98% - 99%)  Wt(kg): --    I&O's Summary      Gen: Appears well in NAD  HEENT:  (-)icterus (-)pallor  CV: N S1 S2 1/6 SATYA (+)2 Pulses B/l  Resp:  Clear to auscultation B/L, normal effort  GI: (+) BS Soft, NT, ND  Lymph:  (-)Edema, (-)obvious lymphadenopathy  Skin: Warm to touch, Normal turgor  Psych: Appropriate mood and affect    TELEMETRY: SR	      ECG:  	NSR    < from: Transthoracic Echocardiogram (03.24.22 @ 09:02) >  ------------------------------------------------------------------------  CONCLUSIONS:  1. Normal mitral valve. Minimal mitral regurgitation.  2. Normal left ventricular internal dimensions and wall  thicknesses.  3. Normal left ventricular systolic function. No segmental  wall motion abnormalities.  4. Mild diastolic dysfunction (Stage I).  5. Normal right ventricular size and function.  ------------------------------------------------------------------------  Confirmed on  3/24/2022 - 10:45:03 by Russell Reyes M.D.,  [facility], [facility]    < end of copied text >      ASSESSMENT/PLAN:  61-year-old female with asthma, HLD, HTN, DM2, gout, obesity, presenting with 2-3 days of chest pain.    --ACS ruled out with serial CE  --TTE with structurally normal heart  --await NST results  --if no ischemia/infarct, plan for DC home and close OP F/U with OP Cardio and OP GI    Freedom Fontenot M.D.  Cardiac Electrophysiology  [phone]      ADDENDUM:  Abnormal stress results reviewed w/ patient.  I've requested that she stay over the weekend for coronary angiogram on Monday.  Has a full stomach, having just finished lunch.  She came in with chest pain, and has an ischemic looking MPI, would consider her symptoms to be unstable angina, and a delayed workup (elective in a few weeks) is not appropriate.    Repeat covid swab over the weekend.

## 2022-03-26 LAB
ANION GAP SERPL CALC-SCNC: 14 MMOL/L — SIGNIFICANT CHANGE UP (ref 7–14)
BUN SERPL-MCNC: 22 MG/DL — SIGNIFICANT CHANGE UP (ref 7–23)
CALCIUM SERPL-MCNC: 10.2 MG/DL — SIGNIFICANT CHANGE UP (ref 8.4–10.5)
CHLORIDE SERPL-SCNC: 103 MMOL/L — SIGNIFICANT CHANGE UP (ref 98–107)
CO2 SERPL-SCNC: 21 MMOL/L — LOW (ref 22–31)
CREAT SERPL-MCNC: 0.77 MG/DL — SIGNIFICANT CHANGE UP (ref 0.5–1.3)
EGFR: 88 ML/MIN/1.73M2 — SIGNIFICANT CHANGE UP
GLUCOSE BLDC GLUCOMTR-MCNC: 147 MG/DL — HIGH (ref 70–99)
GLUCOSE BLDC GLUCOMTR-MCNC: 165 MG/DL — HIGH (ref 70–99)
GLUCOSE BLDC GLUCOMTR-MCNC: 174 MG/DL — HIGH (ref 70–99)
GLUCOSE BLDC GLUCOMTR-MCNC: 186 MG/DL — HIGH (ref 70–99)
GLUCOSE SERPL-MCNC: 166 MG/DL — HIGH (ref 70–99)
HCT VFR BLD CALC: 37.4 % — SIGNIFICANT CHANGE UP (ref 34.5–45)
HGB BLD-MCNC: 11.7 G/DL — SIGNIFICANT CHANGE UP (ref 11.5–15.5)
MAGNESIUM SERPL-MCNC: 2 MG/DL — SIGNIFICANT CHANGE UP (ref 1.6–2.6)
MCHC RBC-ENTMCNC: 27.9 PG — SIGNIFICANT CHANGE UP (ref 27–34)
MCHC RBC-ENTMCNC: 31.3 GM/DL — LOW (ref 32–36)
MCV RBC AUTO: 89 FL — SIGNIFICANT CHANGE UP (ref 80–100)
NRBC # BLD: 0 /100 WBCS — SIGNIFICANT CHANGE UP
NRBC # FLD: 0 K/UL — SIGNIFICANT CHANGE UP
PHOSPHATE SERPL-MCNC: 3.6 MG/DL — SIGNIFICANT CHANGE UP (ref 2.5–4.5)
PLATELET # BLD AUTO: 211 K/UL — SIGNIFICANT CHANGE UP (ref 150–400)
POTASSIUM SERPL-MCNC: 4.4 MMOL/L — SIGNIFICANT CHANGE UP (ref 3.5–5.3)
POTASSIUM SERPL-SCNC: 4.4 MMOL/L — SIGNIFICANT CHANGE UP (ref 3.5–5.3)
RBC # BLD: 4.2 M/UL — SIGNIFICANT CHANGE UP (ref 3.8–5.2)
RBC # FLD: 13.9 % — SIGNIFICANT CHANGE UP (ref 10.3–14.5)
SODIUM SERPL-SCNC: 138 MMOL/L — SIGNIFICANT CHANGE UP (ref 135–145)
WBC # BLD: 6.11 K/UL — SIGNIFICANT CHANGE UP (ref 3.8–10.5)
WBC # FLD AUTO: 6.11 K/UL — SIGNIFICANT CHANGE UP (ref 3.8–10.5)

## 2022-03-26 RX ORDER — POLYETHYLENE GLYCOL 3350 17 G/17G
17 POWDER, FOR SOLUTION ORAL DAILY
Refills: 0 | Status: DISCONTINUED | OUTPATIENT
Start: 2022-03-26 | End: 2022-03-29

## 2022-03-26 RX ORDER — SENNA PLUS 8.6 MG/1
2 TABLET ORAL AT BEDTIME
Refills: 0 | Status: DISCONTINUED | OUTPATIENT
Start: 2022-03-26 | End: 2022-03-29

## 2022-03-26 RX ADMIN — MONTELUKAST 10 MILLIGRAM(S): 4 TABLET, CHEWABLE ORAL at 08:50

## 2022-03-26 RX ADMIN — Medication 1: at 13:00

## 2022-03-26 RX ADMIN — Medication 1: at 08:51

## 2022-03-26 RX ADMIN — GABAPENTIN 300 MILLIGRAM(S): 400 CAPSULE ORAL at 08:50

## 2022-03-26 RX ADMIN — Medication 300 MILLIGRAM(S): at 08:50

## 2022-03-26 RX ADMIN — Medication 2 TABLET(S): at 22:07

## 2022-03-26 RX ADMIN — POLYETHYLENE GLYCOL 3350 17 GRAM(S): 17 POWDER, FOR SOLUTION ORAL at 08:50

## 2022-03-26 RX ADMIN — LOSARTAN POTASSIUM 50 MILLIGRAM(S): 100 TABLET, FILM COATED ORAL at 06:41

## 2022-03-26 RX ADMIN — HEPARIN SODIUM 5000 UNIT(S): 5000 INJECTION INTRAVENOUS; SUBCUTANEOUS at 22:08

## 2022-03-26 RX ADMIN — SIMVASTATIN 40 MILLIGRAM(S): 20 TABLET, FILM COATED ORAL at 22:07

## 2022-03-26 NOTE — PROGRESS NOTE ADULT - SUBJECTIVE AND OBJECTIVE BOX
date of service 3/26/22    HISTORY OF PRESENT ILLNESS: HPI:  61-year-old female with asthma, HLD, HTN, DM2, gout, obesity, presenting with 2-3 days of chest pain. Patient cannot recall any inciting incident when pain started however notes it has been constant 7-8/10 in severity, non-radiating, substernal, described "as a ball." Pain was keeping patient from sleeping last night. Pain is occasionally worse with taking a deep breath. She reports no history of similar pain in past. She took acetaminophen for pain at home without relief. She reports no relief with medications received in the ED. She does not take ASA, hormones, denies any recent travel. No history of DVT/PE, no recent travel. No recent changes in diet. She reports occasional sharp pain to proximal to R shoulder posteriorly with movement.    Pt reports normal stress test 1 year ago.  No hx CAD, MI, Arrhythmias.  Reports known Hiatal Hernia and has upcoming GI eval next month.    Intermittent chest pain, no SOB    Date of service mm/dd/year    chief complaint:    extended hpi:     Review of Systems:   Constitutional: [ ] fevers, [ ] chills.   Skin: [ ] dry skin. [ ] rashes.  Psychiatric: [ ] depression, [ ] anxiety.   Gastrointestinal: [ ] BRBPR, [ ] melena.   Neurological: [ ] confusion. [ ] seizures. [ ] shuffling gait.   Ears,Nose,Mouth and Throat: [ ] ear pain [ ] sore throat.   Eyes: [ ] diplopia.   Respiratory: [ ] hemoptysis. [ ] shortness of breath  Cardiovascular: See HPI above  Hematologic/Lymphatic: [ ] anemia. [ ] painful nodes. [ ] prolonged bleeding.   Genitourinary: [ ] hematuria. [ ] flank pain.   Endocrine: [ ] significant change in weight. [ ] intolerance to heat and cold.     Review of systems [ ] otherwise negative, [ ] otherwise unable to obtain    FH: no family history of sudden cardiac death in first degree relatives    SH: [ ] tobacco, [ ] alcohol, [ ] drugs    ALBUTerol    90 MICROgram(s) HFA Inhaler 2 Puff(s) Inhalation every 6 hours PRN  allopurinol 300 milliGRAM(s) Oral daily  calcium carbonate    500 mG (Tums) Chewable 2 Tablet(s) Chew every 6 hours PRN  dextrose 40% Gel 15 Gram(s) Oral once  dextrose 5%. 1000 milliLiter(s) IV Continuous <Continuous>  dextrose 5%. 1000 milliLiter(s) IV Continuous <Continuous>  dextrose 50% Injectable 25 Gram(s) IV Push once  dextrose 50% Injectable 12.5 Gram(s) IV Push once  dextrose 50% Injectable 25 Gram(s) IV Push once  gabapentin 300 milliGRAM(s) Oral daily  glucagon  Injectable 1 milliGRAM(s) IntraMuscular once  heparin   Injectable 5000 Unit(s) SubCutaneous every 8 hours  insulin lispro (ADMELOG) corrective regimen sliding scale   SubCutaneous three times a day before meals  insulin lispro (ADMELOG) corrective regimen sliding scale   SubCutaneous at bedtime  lidocaine   4% Patch 1 Patch Transdermal every 24 hours PRN  losartan 50 milliGRAM(s) Oral daily  montelukast 10 milliGRAM(s) Oral daily  nitroglycerin     SubLingual 0.4 milliGRAM(s) SubLingual every 5 minutes PRN  polyethylene glycol 3350 17 Gram(s) Oral daily  senna 2 Tablet(s) Oral at bedtime  simethicone 80 milliGRAM(s) Chew every 6 hours PRN  simvastatin 40 milliGRAM(s) Oral at bedtime                            11.7   6.11  )-----------( 211      ( 26 Mar 2022 07:29 )             37.4       03-26    138  |  103  |  22  ----------------------------<  166<H>  4.4   |  21<L>  |  0.77    Ca    10.2      26 Mar 2022 07:29  Phos  3.6     03-26  Mg     2.00     03-26      T(C): 36.2 (03-26-22 @ 14:01), Max: 36.6 (03-26-22 @ 06:35)  HR: 98 (03-26-22 @ 14:01) (74 - 98)  BP: 117/72 (03-26-22 @ 14:01) (110/62 - 117/72)  RR: 18 (03-26-22 @ 14:01) (17 - 18)  SpO2: 99% (03-26-22 @ 14:01) (99% - 100%)      General: Well nourished in no acute distress. Alert and Oriented * 3.   Head: Normocephalic and atraumatic.   Neck: No JVD. No bruits. Supple. Does not appear to be enlarged.   Cardiovascular: + S1,S2 ; RRR Soft systolic murmur at the left lower sternal border. No rubs noted.    Lungs: CTA b/l. No rhonchi, rales or wheezes.   Abdomen: + BS, soft. Non tender. Non distended. No rebound. No guarding.   Extremities: No clubbing/cyanosis/edema.   Neurologic: Moves all four extremities. Full range of motion.   Skin: Warm and moist. The patient's skin has normal elasticity and good skin turgor.   Psychiatric: Appropriate mood and affect.  Musculoskeletal: Normal range of motion, normal strength      TELEMETRY: SR	      ECG:  	NSR    < from: Transthoracic Echocardiogram (03.24.22 @ 09:02) >  ------------------------------------------------------------------------  CONCLUSIONS:  1. Normal mitral valve. Minimal mitral regurgitation.  2. Normal left ventricular internal dimensions and wall  thicknesses.  3. Normal left ventricular systolic function. No segmental  wall motion abnormalities.  4. Mild diastolic dysfunction (Stage I).  5. Normal right ventricular size and function.  ------------------------------------------------------------------------  Confirmed on  3/24/2022 - 10:45:03 by Russell Reyes M.D.,  [facility], [place]    < end of copied text >    < from: Nuclear Stress Test-Exercise (Nuclear Stress Test-Exercise .) (03.24.22 @ 10:56) >  GATED ANALYSIS:  Post-stress gated wall motion analysis was performed (LVEF  = 56 %;LVEDV = 87 ml.), revealing normal LV function with  no segmental wall motion abnoramlities. The RV function  appeared normal.  ------------------------------------------------------------------------  IMPRESSIONS:Abnormal Study  * Myocardial Perfusion SPECT results are abnormal.  * Review of raw data shows: The study is of adequate  technical quality  * The left ventricle was normal in size. There is a medium  sized, moderate defect in apical, inferior and  inferolateral wall that is reversible suggestive of  ischemia. There is a medium sized, mild defect in  anterolateral wall that is reversible, suggestive of  ischemia.  * Post-stress gated wall motion analysis was performed  (LVEF = 56 %;LVEDV = 87 ml.), revealing normal LV function  with no segmental wall motion abnoramlities. The RV  function appeared normal.  ------------------------------------------------------------------------  Confirmed on  3/25/2022 - 11:15:56 by Félix Henley M.D.    < end of copied text >        ASSESSMENT/PLAN:  61-year-old female with asthma, HLD, HTN, DM2, gout, obesity, presenting with 2-3 days of chest pain.    --ACS ruled out with serial CE  --TTE with structurally normal heart  --NST with ischemia as noted above  --[facility] Monday  --send covid PCR today please

## 2022-03-27 LAB
ANION GAP SERPL CALC-SCNC: 13 MMOL/L — SIGNIFICANT CHANGE UP (ref 7–14)
BUN SERPL-MCNC: 22 MG/DL — SIGNIFICANT CHANGE UP (ref 7–23)
CALCIUM SERPL-MCNC: 10 MG/DL — SIGNIFICANT CHANGE UP (ref 8.4–10.5)
CHLORIDE SERPL-SCNC: 102 MMOL/L — SIGNIFICANT CHANGE UP (ref 98–107)
CO2 SERPL-SCNC: 20 MMOL/L — LOW (ref 22–31)
CREAT SERPL-MCNC: 0.78 MG/DL — SIGNIFICANT CHANGE UP (ref 0.5–1.3)
EGFR: 87 ML/MIN/1.73M2 — SIGNIFICANT CHANGE UP
GLUCOSE BLDC GLUCOMTR-MCNC: 121 MG/DL — HIGH (ref 70–99)
GLUCOSE BLDC GLUCOMTR-MCNC: 160 MG/DL — HIGH (ref 70–99)
GLUCOSE BLDC GLUCOMTR-MCNC: 195 MG/DL — HIGH (ref 70–99)
GLUCOSE BLDC GLUCOMTR-MCNC: 211 MG/DL — HIGH (ref 70–99)
GLUCOSE SERPL-MCNC: 188 MG/DL — HIGH (ref 70–99)
HCT VFR BLD CALC: 37.7 % — SIGNIFICANT CHANGE UP (ref 34.5–45)
HGB BLD-MCNC: 11.8 G/DL — SIGNIFICANT CHANGE UP (ref 11.5–15.5)
MAGNESIUM SERPL-MCNC: 2 MG/DL — SIGNIFICANT CHANGE UP (ref 1.6–2.6)
MCHC RBC-ENTMCNC: 27.9 PG — SIGNIFICANT CHANGE UP (ref 27–34)
MCHC RBC-ENTMCNC: 31.3 GM/DL — LOW (ref 32–36)
MCV RBC AUTO: 89.1 FL — SIGNIFICANT CHANGE UP (ref 80–100)
NRBC # BLD: 0 /100 WBCS — SIGNIFICANT CHANGE UP
NRBC # FLD: 0 K/UL — SIGNIFICANT CHANGE UP
PHOSPHATE SERPL-MCNC: 3.6 MG/DL — SIGNIFICANT CHANGE UP (ref 2.5–4.5)
PLATELET # BLD AUTO: 195 K/UL — SIGNIFICANT CHANGE UP (ref 150–400)
POTASSIUM SERPL-MCNC: 4.6 MMOL/L — SIGNIFICANT CHANGE UP (ref 3.5–5.3)
POTASSIUM SERPL-SCNC: 4.6 MMOL/L — SIGNIFICANT CHANGE UP (ref 3.5–5.3)
RBC # BLD: 4.23 M/UL — SIGNIFICANT CHANGE UP (ref 3.8–5.2)
RBC # FLD: 13.7 % — SIGNIFICANT CHANGE UP (ref 10.3–14.5)
SARS-COV-2 RNA SPEC QL NAA+PROBE: SIGNIFICANT CHANGE UP
SODIUM SERPL-SCNC: 135 MMOL/L — SIGNIFICANT CHANGE UP (ref 135–145)
WBC # BLD: 6.26 K/UL — SIGNIFICANT CHANGE UP (ref 3.8–10.5)
WBC # FLD AUTO: 6.26 K/UL — SIGNIFICANT CHANGE UP (ref 3.8–10.5)

## 2022-03-27 RX ORDER — INSULIN LISPRO 100/ML
VIAL (ML) SUBCUTANEOUS EVERY 6 HOURS
Refills: 0 | Status: DISCONTINUED | OUTPATIENT
Start: 2022-03-27 | End: 2022-03-28

## 2022-03-27 RX ADMIN — SIMVASTATIN 40 MILLIGRAM(S): 20 TABLET, FILM COATED ORAL at 21:38

## 2022-03-27 RX ADMIN — Medication 1: at 08:55

## 2022-03-27 RX ADMIN — LOSARTAN POTASSIUM 50 MILLIGRAM(S): 100 TABLET, FILM COATED ORAL at 05:38

## 2022-03-27 RX ADMIN — Medication 2: at 12:39

## 2022-03-27 RX ADMIN — POLYETHYLENE GLYCOL 3350 17 GRAM(S): 17 POWDER, FOR SOLUTION ORAL at 08:59

## 2022-03-27 RX ADMIN — Medication 300 MILLIGRAM(S): at 08:56

## 2022-03-27 RX ADMIN — GABAPENTIN 300 MILLIGRAM(S): 400 CAPSULE ORAL at 08:56

## 2022-03-27 RX ADMIN — MONTELUKAST 10 MILLIGRAM(S): 4 TABLET, CHEWABLE ORAL at 08:56

## 2022-03-27 NOTE — PROGRESS NOTE ADULT - ASSESSMENT
Patient seen and examined, agree with above assessment and plan as transcribed above.    - for cath tomorrow     Jesse Stover MD, [facility]  [place] [phone]

## 2022-03-27 NOTE — PROGRESS NOTE ADULT - SUBJECTIVE AND OBJECTIVE BOX
date of service 3/27/22    HISTORY OF PRESENT ILLNESS: HPI:  61-year-old female with asthma, HLD, HTN, DM2, gout, obesity, presenting with 2-3 days of chest pain. Patient cannot recall any inciting incident when pain started however notes it has been constant 7-8/10 in severity, non-radiating, substernal, described "as a ball." Pain was keeping patient from sleeping last night. Pain is occasionally worse with taking a deep breath. She reports no history of similar pain in past. She took acetaminophen for pain at home without relief. She reports no relief with medications received in the ED. She does not take ASA, hormones, denies any recent travel. No history of DVT/PE, no recent travel. No recent changes in diet. She reports occasional sharp pain to proximal to R shoulder posteriorly with movement.    Pt reports normal stress test 1 year ago.  No hx CAD, MI, Arrhythmias.  Reports known Hiatal Hernia and has upcoming GI eval next month.      3/27  no events overnight, no chest pain / sob         Review of Systems:   Constitutional: [ ] fevers, [ ] chills.   Skin: [ ] dry skin. [ ] rashes.  Psychiatric: [ ] depression, [ ] anxiety.   Gastrointestinal: [ ] BRBPR, [ ] melena.   Neurological: [ ] confusion. [ ] seizures. [ ] shuffling gait.   Ears,Nose,Mouth and Throat: [ ] ear pain [ ] sore throat.   Eyes: [ ] diplopia.   Respiratory: [ ] hemoptysis. [ ] shortness of breath  Cardiovascular: See HPI above  Hematologic/Lymphatic: [ ] anemia. [ ] painful nodes. [ ] prolonged bleeding.   Genitourinary: [ ] hematuria. [ ] flank pain.   Endocrine: [ ] significant change in weight. [ ] intolerance to heat and cold.     Review of systems [ ] otherwise negative, [ ] otherwise unable to obtain    FH: no family history of sudden cardiac death in first degree relatives    SH: [ ] tobacco, [ ] alcohol, [ ] drugs          ALBUTerol    90 MICROgram(s) HFA Inhaler 2 Puff(s) Inhalation every 6 hours PRN  allopurinol 300 milliGRAM(s) Oral daily  calcium carbonate    500 mG (Tums) Chewable 2 Tablet(s) Chew every 6 hours PRN  dextrose 40% Gel 15 Gram(s) Oral once  dextrose 5%. 1000 milliLiter(s) IV Continuous <Continuous>  dextrose 5%. 1000 milliLiter(s) IV Continuous <Continuous>  dextrose 50% Injectable 25 Gram(s) IV Push once  dextrose 50% Injectable 12.5 Gram(s) IV Push once  dextrose 50% Injectable 25 Gram(s) IV Push once  gabapentin 300 milliGRAM(s) Oral daily  glucagon  Injectable 1 milliGRAM(s) IntraMuscular once  heparin   Injectable 5000 Unit(s) SubCutaneous every 8 hours  insulin lispro (ADMELOG) corrective regimen sliding scale   SubCutaneous three times a day before meals  insulin lispro (ADMELOG) corrective regimen sliding scale   SubCutaneous at bedtime  lidocaine   4% Patch 1 Patch Transdermal every 24 hours PRN  losartan 50 milliGRAM(s) Oral daily  montelukast 10 milliGRAM(s) Oral daily  nitroglycerin     SubLingual 0.4 milliGRAM(s) SubLingual every 5 minutes PRN  polyethylene glycol 3350 17 Gram(s) Oral daily  senna 2 Tablet(s) Oral at bedtime  simethicone 80 milliGRAM(s) Chew every 6 hours PRN  simvastatin 40 milliGRAM(s) Oral at bedtime                            11.7   6.11  )-----------( 211      ( 26 Mar 2022 07:29 )             37.4       Hemoglobin: 11.7 g/dL (03-26 @ 07:29)  Hemoglobin: 12.3 g/dL (03-25 @ 07:23)  Hemoglobin: 11.7 g/dL (03-24 @ 06:53)  Hemoglobin: 12.4 g/dL (03-23 @ 14:06)      03-26    138  |  103  |  22  ----------------------------<  166<H>  4.4   |  21<L>  |  0.77    Ca    10.2      26 Mar 2022 07:29  Phos  3.6     03-26  Mg     2.00     03-26      Creatinine Trend: 0.77<--, 0.84<--, 0.84<--, 0.88<--    COAGS:           T(C): 36.6 (03-27-22 @ 05:30), Max: 36.6 (03-26-22 @ 21:45)  HR: 82 (03-27-22 @ 05:30) (82 - 98)  BP: 139/68 (03-27-22 @ 05:30) (117/72 - 147/62)  RR: 18 (03-27-22 @ 05:30) (18 - 18)  SpO2: 98% (03-27-22 @ 05:30) (98% - 99%)  Wt(kg): --    I&O's Summary    General: Well nourished in no acute distress. Alert and Oriented * 3.   Head: Normocephalic and atraumatic.   Neck: No JVD. No bruits. Supple. Does not appear to be enlarged.   Cardiovascular: + S1,S2 ; RRR Soft systolic murmur at the left lower sternal border. No rubs noted.    Lungs: CTA b/l. No rhonchi, rales or wheezes.   Abdomen: + BS, soft. Non tender. Non distended. No rebound. No guarding.   Extremities: No clubbing/cyanosis/edema.   Neurologic: Moves all four extremities. Full range of motion.   Skin: Warm and moist. The patient's skin has normal elasticity and good skin turgor.   Psychiatric: Appropriate mood and affect.  Musculoskeletal: Normal range of motion, normal strength      TELEMETRY: SR	      ECG:  	NSR    < from: Transthoracic Echocardiogram (03.24.22 @ 09:02) >  ------------------------------------------------------------------------  CONCLUSIONS:  1. Normal mitral valve. Minimal mitral regurgitation.  2. Normal left ventricular internal dimensions and wall  thicknesses.  3. Normal left ventricular systolic function. No segmental  wall motion abnormalities.  4. Mild diastolic dysfunction (Stage I).  5. Normal right ventricular size and function.  ------------------------------------------------------------------------  Confirmed on  3/24/2022 - 10:45:03 by Russell Reyes M.D.,  [facility], [facility]    < end of copied text >    < from: Nuclear Stress Test-Exercise (Nuclear Stress Test-Exercise .) (03.24.22 @ 10:56) >  GATED ANALYSIS:  Post-stress gated wall motion analysis was performed (LVEF  = 56 %;LVEDV = 87 ml.), revealing normal LV function with  no segmental wall motion abnoramlities. The RV function  appeared normal.  ------------------------------------------------------------------------  IMPRESSIONS:Abnormal Study  * Myocardial Perfusion SPECT results are abnormal.  * Review of raw data shows: The study is of adequate  technical quality  * The left ventricle was normal in size. There is a medium  sized, moderate defect in apical, inferior and  inferolateral wall that is reversible suggestive of  ischemia. There is a medium sized, mild defect in  anterolateral wall that is reversible, suggestive of  ischemia.  * Post-stress gated wall motion analysis was performed  (LVEF = 56 %;LVEDV = 87 ml.), revealing normal LV function  with no segmental wall motion abnoramlities. The RV  function appeared normal.  ------------------------------------------------------------------------  Confirmed on  3/25/2022 - 11:15:56 by Félix Henley M.D.    < end of copied text >        ASSESSMENT/PLAN:  61-year-old female with asthma, HLD, HTN, DM2, gout, obesity, presenting with 2-3 days of chest pain.    --ACS ruled out with serial CE  --TTE with structurally normal heart  --NST with ischemia as noted above  --[facility] Monday   --send covid PCR today please

## 2022-03-28 ENCOUNTER — TRANSCRIPTION ENCOUNTER (OUTPATIENT)
Age: 62
End: 2022-03-28

## 2022-03-28 LAB
ANION GAP SERPL CALC-SCNC: 14 MMOL/L — SIGNIFICANT CHANGE UP (ref 7–14)
BUN SERPL-MCNC: 21 MG/DL — SIGNIFICANT CHANGE UP (ref 7–23)
CALCIUM SERPL-MCNC: 10 MG/DL — SIGNIFICANT CHANGE UP (ref 8.4–10.5)
CHLORIDE SERPL-SCNC: 99 MMOL/L — SIGNIFICANT CHANGE UP (ref 98–107)
CO2 SERPL-SCNC: 23 MMOL/L — SIGNIFICANT CHANGE UP (ref 22–31)
CREAT SERPL-MCNC: 0.85 MG/DL — SIGNIFICANT CHANGE UP (ref 0.5–1.3)
EGFR: 78 ML/MIN/1.73M2 — SIGNIFICANT CHANGE UP
GLUCOSE BLDC GLUCOMTR-MCNC: 134 MG/DL — HIGH (ref 70–99)
GLUCOSE BLDC GLUCOMTR-MCNC: 166 MG/DL — HIGH (ref 70–99)
GLUCOSE BLDC GLUCOMTR-MCNC: 192 MG/DL — HIGH (ref 70–99)
GLUCOSE BLDC GLUCOMTR-MCNC: 250 MG/DL — HIGH (ref 70–99)
GLUCOSE BLDC GLUCOMTR-MCNC: 88 MG/DL — SIGNIFICANT CHANGE UP (ref 70–99)
GLUCOSE SERPL-MCNC: 196 MG/DL — HIGH (ref 70–99)
HCT VFR BLD CALC: 39.3 % — SIGNIFICANT CHANGE UP (ref 34.5–45)
HGB BLD-MCNC: 12.4 G/DL — SIGNIFICANT CHANGE UP (ref 11.5–15.5)
MAGNESIUM SERPL-MCNC: 1.9 MG/DL — SIGNIFICANT CHANGE UP (ref 1.6–2.6)
MCHC RBC-ENTMCNC: 28.2 PG — SIGNIFICANT CHANGE UP (ref 27–34)
MCHC RBC-ENTMCNC: 31.6 GM/DL — LOW (ref 32–36)
MCV RBC AUTO: 89.3 FL — SIGNIFICANT CHANGE UP (ref 80–100)
NRBC # BLD: 0 /100 WBCS — SIGNIFICANT CHANGE UP
NRBC # FLD: 0 K/UL — SIGNIFICANT CHANGE UP
PHOSPHATE SERPL-MCNC: 3 MG/DL — SIGNIFICANT CHANGE UP (ref 2.5–4.5)
PLATELET # BLD AUTO: 209 K/UL — SIGNIFICANT CHANGE UP (ref 150–400)
POTASSIUM SERPL-MCNC: 4.3 MMOL/L — SIGNIFICANT CHANGE UP (ref 3.5–5.3)
POTASSIUM SERPL-SCNC: 4.3 MMOL/L — SIGNIFICANT CHANGE UP (ref 3.5–5.3)
RBC # BLD: 4.4 M/UL — SIGNIFICANT CHANGE UP (ref 3.8–5.2)
RBC # FLD: 13.7 % — SIGNIFICANT CHANGE UP (ref 10.3–14.5)
SODIUM SERPL-SCNC: 136 MMOL/L — SIGNIFICANT CHANGE UP (ref 135–145)
WBC # BLD: 6.33 K/UL — SIGNIFICANT CHANGE UP (ref 3.8–10.5)
WBC # FLD AUTO: 6.33 K/UL — SIGNIFICANT CHANGE UP (ref 3.8–10.5)

## 2022-03-28 RX ORDER — SODIUM CHLORIDE 9 MG/ML
500 INJECTION INTRAMUSCULAR; INTRAVENOUS; SUBCUTANEOUS
Refills: 0 | Status: DISCONTINUED | OUTPATIENT
Start: 2022-03-28 | End: 2022-03-29

## 2022-03-28 RX ORDER — INSULIN LISPRO 100/ML
VIAL (ML) SUBCUTANEOUS
Refills: 0 | Status: DISCONTINUED | OUTPATIENT
Start: 2022-03-28 | End: 2022-03-29

## 2022-03-28 RX ORDER — ACETAMINOPHEN 500 MG
1000 TABLET ORAL ONCE
Refills: 0 | Status: COMPLETED | OUTPATIENT
Start: 2022-03-28 | End: 2022-03-28

## 2022-03-28 RX ORDER — MAGNESIUM SULFATE 500 MG/ML
2 VIAL (ML) INJECTION ONCE
Refills: 0 | Status: COMPLETED | OUTPATIENT
Start: 2022-03-28 | End: 2022-03-28

## 2022-03-28 RX ADMIN — SODIUM CHLORIDE 75 MILLILITER(S): 9 INJECTION INTRAMUSCULAR; INTRAVENOUS; SUBCUTANEOUS at 15:35

## 2022-03-28 RX ADMIN — Medication 1: at 06:17

## 2022-03-28 RX ADMIN — Medication 25 GRAM(S): at 10:30

## 2022-03-28 RX ADMIN — LOSARTAN POTASSIUM 50 MILLIGRAM(S): 100 TABLET, FILM COATED ORAL at 05:04

## 2022-03-28 RX ADMIN — GABAPENTIN 300 MILLIGRAM(S): 400 CAPSULE ORAL at 18:26

## 2022-03-28 RX ADMIN — Medication 300 MILLIGRAM(S): at 18:26

## 2022-03-28 RX ADMIN — LIDOCAINE 1 PATCH: 4 CREAM TOPICAL at 23:32

## 2022-03-28 RX ADMIN — Medication 1000 MILLIGRAM(S): at 23:32

## 2022-03-28 RX ADMIN — SIMVASTATIN 40 MILLIGRAM(S): 20 TABLET, FILM COATED ORAL at 21:12

## 2022-03-28 RX ADMIN — MONTELUKAST 10 MILLIGRAM(S): 4 TABLET, CHEWABLE ORAL at 18:26

## 2022-03-28 NOTE — DISCHARGE NOTE PROVIDER - CARE PROVIDER_API CALL
Russell Horn)  Internal Medicine; Nephrology  [address] [place] [address]  Phone: [phone]  Fax: [phone]  Follow Up Time:     Sloan Naidu)  Cardiovascular Disease; Internal Medicine; Interventional Cardiology  [address] [place] [address]  Phone: [phone]  Fax: [phone]  Follow Up Time:

## 2022-03-28 NOTE — DISCHARGE NOTE PROVIDER - HOSPITAL COURSE
61-year-old female with asthma, HLD, HTN, DM2, gout, obesity, presenting with 2-3 days of constant chest pain.      Chest pain.   - Unrelieved with nitro, constant, reproducible, unclear etiology  - NST medium sized, moderate defect in apical, inferior and  inferolateral wall that is reversible suggestive of ischemia. There is a medium sized, mild defect in anterolateral wall that is reversible,  suggestive of  ischemia.  - D-dimer negative  - Simethicone and lidocaine patch PRN   - LHC: mild diffuse dz, LVEDP 13, RRA accessed    Hyperlipidemia.   - Statin     Type 2 diabetes mellitus.   A1c 7.8     Asthma.   - Montelukast  - KRISTOFER PRN.    Gout.   - Allopurinol.           61-year-old female with asthma, HLD, HTN, DM2, gout, obesity, presenting with 2-3 days of constant chest pain.      Chest pain.   - Unrelieved with nitro, constant, reproducible, unclear etiology  - NST medium sized, moderate defect in apical, inferior and  inferolateral wall that is reversible suggestive of ischemia. There is a medium sized, mild defect in anterolateral wall that is reversible,  suggestive of  ischemia.  - D-dimer negative  - Simethicone and lidocaine patch PRN   - LHC: mild diffuse dz, LVEDP 13, RRA accessed    Hyperlipidemia.   - Statin     Type 2 diabetes mellitus.   A1c 7.8     Asthma.   - Montelukast  - KRISTOFER PRN.    Gout.   - Allopurinol.          Patient seen and evaluated. Reviewed discharge medications with patient and attending. All new medications requiring new prescriptions were sent to the pharmacy of patient's choice. Reviewed need for prescription for previous home medications and new prescriptions sent if requested. Medically cleared/stable for discharge as per Dr. Naidu on 3/29/2022 with appropriate follow up. Patient understands and agrees with plan of care. 61-year-old female with asthma, HLD, HTN, DM2, gout, obesity, presenting with 2-3 days of constant chest pain.      Chest pain.   - Unrelieved with nitro, constant, reproducible, unclear etiology  - NST medium sized, moderate defect in apical, inferior and  inferolateral wall that is reversible suggestive of ischemia. There is a medium sized, mild defect in anterolateral wall that is reversible,  suggestive of  ischemia.  - D-dimer negative  - Simethicone and lidocaine patch PRN   - LHC: mild diffuse dz, LVEDP 13, RRA accessed  - discharge home on aspirin 81mg po daily as per cardiology     Hyperlipidemia.   - Statin     Type 2 diabetes mellitus.   A1c 7.8     Asthma.   - Montelukast  - KRISTOFER PRN.    Gout.   - Allopurinol.    Patient seen and evaluated. Reviewed discharge medications with patient and attending. All new medications requiring new prescriptions were sent to the pharmacy of patient's choice. Reviewed need for prescription for previous home medications and new prescriptions sent if requested. Medically cleared/stable for discharge as per Dr. Naidu on 3/29/2022 with appropriate follow up. Patient understands and agrees with plan of care.

## 2022-03-28 NOTE — DISCHARGE NOTE PROVIDER - NSDCCPCAREPLAN_GEN_ALL_CORE_FT
PRINCIPAL DISCHARGE DIAGNOSIS  Diagnosis: Chest pain  Assessment and Plan of Treatment: You presented to the [facility] with chest pain, you had a stress test which showed reversible ischemia (decreased oxygen) to your haeat.  You had a catheteterization of you cardiac arteries which showed no blockages. You are to follow up with you cardiologist within 1-2 weeks.      SECONDARY DISCHARGE DIAGNOSES  Diagnosis: Hyperlipidemia  Assessment and Plan of Treatment: You are to continue you normal cholesterol medication and follow up with you primary doctor with any further questions    Diagnosis: Essential hypertension  Assessment and Plan of Treatment: You are to continue you normal blood pressure medication and follow up with your primary doctor with any further questions    Diagnosis: Asthma  Assessment and Plan of Treatment: Please continue with your normal home medications and follow up with your primary doctor.    Diagnosis: Gout  Assessment and Plan of Treatment: Please continue your home medications and follow up with your primary doctor for any further questions    Diagnosis: Diabetes mellitus  Assessment and Plan of Treatment: You have diabetes, your A1C was 7.8, and your sugars while you were in the hospital were elevated.  You are to continue on your normal diabetes medications and follow up with primary doctor reguarding controlling your blood sugars.     PRINCIPAL DISCHARGE DIAGNOSIS  Diagnosis: Chest pain  Assessment and Plan of Treatment: You presented to the [facility] with chest pain, you had a stress test which showed reversible ischemia (decreased oxygen) to your haeat.  You had a catheteterization of you cardiac arteries which showed no blockages. You are being started on ASPIRIN 81 mg once a day you can purchase this over the counter at your pharmacy. You are to follow up with you cardiologist within 1-2 weeks.      SECONDARY DISCHARGE DIAGNOSES  Diagnosis: Hyperlipidemia  Assessment and Plan of Treatment: You are to continue you normal cholesterol medication and follow up with you primary doctor with any further questions    Diagnosis: Essential hypertension  Assessment and Plan of Treatment: You are to continue you normal blood pressure medication and follow up with your primary doctor with any further questions    Diagnosis: Asthma  Assessment and Plan of Treatment: Please continue with your normal home medications and follow up with your primary doctor.    Diagnosis: Gout  Assessment and Plan of Treatment: Please continue your home medications and follow up with your primary doctor for any further questions    Diagnosis: Diabetes mellitus  Assessment and Plan of Treatment: You have diabetes, your A1C was 7.8, and your sugars while you were in the hospital were elevated.  You are to continue on your normal diabetes medications and follow up with primary doctor reguarding controlling your blood sugars.

## 2022-03-28 NOTE — DISCHARGE NOTE PROVIDER - NSDCFUADDINST_GEN_ALL_CORE_FT
You are to follow up with your cardiologist and your primary doctor.  If you have any worsening symptoms please contact your doctor or come to hospital.

## 2022-03-28 NOTE — CHART NOTE - NSCHARTNOTEFT_GEN_A_CORE
Right radial dressing c/d/i, + radial pulse, full ROM, sensation intact, good capillary refill.  offers no complaints

## 2022-03-28 NOTE — PROGRESS NOTE ADULT - SUBJECTIVE AND OBJECTIVE BOX
date of service 3/28/22    HISTORY OF PRESENT ILLNESS: HPI:  61-year-old female with asthma, HLD, HTN, DM2, gout, obesity, presenting with 2-3 days of chest pain. Patient cannot recall any inciting incident when pain started however notes it has been constant 7-8/10 in severity, non-radiating, substernal, described "as a ball." Pain was keeping patient from sleeping last night. Pain is occasionally worse with taking a deep breath. She reports no history of similar pain in past. She took acetaminophen for pain at home without relief. She reports no relief with medications received in the ED. She does not take ASA, hormones, denies any recent travel. No history of DVT/PE, no recent travel. No recent changes in diet. She reports occasional sharp pain to proximal to R shoulder posteriorly with movement.    Pt reports normal stress test 1 year ago.  No hx CAD, MI, Arrhythmias.  Reports known Hiatal Hernia and has upcoming GI eval next month.      S: no chest pain or sob; ros otherwise negative.          Review of Systems:   Constitutional: [ ] fevers, [ ] chills.   Skin: [ ] dry skin. [ ] rashes.  Psychiatric: [ ] depression, [ ] anxiety.   Gastrointestinal: [ ] BRBPR, [ ] melena.   Neurological: [ ] confusion. [ ] seizures. [ ] shuffling gait.   Ears,Nose,Mouth and Throat: [ ] ear pain [ ] sore throat.   Eyes: [ ] diplopia.   Respiratory: [ ] hemoptysis. [ ] shortness of breath  Cardiovascular: See HPI above  Hematologic/Lymphatic: [ ] anemia. [ ] painful nodes. [ ] prolonged bleeding.   Genitourinary: [ ] hematuria. [ ] flank pain.   Endocrine: [ ] significant change in weight. [ ] intolerance to heat and cold.     Review of systems [x ] otherwise negative, [ ] otherwise unable to obtain    FH: no family history of sudden cardiac death in first degree relatives    SH: [ ] tobacco, [ ] alcohol, [ ] drugs       ALBUTerol    90 MICROgram(s) HFA Inhaler 2 Puff(s) Inhalation every 6 hours PRN  allopurinol 300 milliGRAM(s) Oral daily  calcium carbonate    500 mG (Tums) Chewable 2 Tablet(s) Chew every 6 hours PRN  dextrose 40% Gel 15 Gram(s) Oral once  dextrose 5%. 1000 milliLiter(s) IV Continuous <Continuous>  dextrose 5%. 1000 milliLiter(s) IV Continuous <Continuous>  dextrose 50% Injectable 25 Gram(s) IV Push once  dextrose 50% Injectable 12.5 Gram(s) IV Push once  dextrose 50% Injectable 25 Gram(s) IV Push once  gabapentin 300 milliGRAM(s) Oral daily  glucagon  Injectable 1 milliGRAM(s) IntraMuscular once  heparin   Injectable 5000 Unit(s) SubCutaneous every 8 hours  insulin lispro (ADMELOG) corrective regimen sliding scale   SubCutaneous every 6 hours  lidocaine   4% Patch 1 Patch Transdermal every 24 hours PRN  losartan 50 milliGRAM(s) Oral daily  magnesium sulfate  IVPB 2 Gram(s) IV Intermittent once  montelukast 10 milliGRAM(s) Oral daily  nitroglycerin     SubLingual 0.4 milliGRAM(s) SubLingual every 5 minutes PRN  polyethylene glycol 3350 17 Gram(s) Oral daily  senna 2 Tablet(s) Oral at bedtime  simethicone 80 milliGRAM(s) Chew every 6 hours PRN  simvastatin 40 milliGRAM(s) Oral at bedtime                            12.4   6.33  )-----------( 209      ( 28 Mar 2022 08:30 )             39.3       03-28    136  |  99  |  21  ----------------------------<  196<H>  4.3   |  23  |  0.85    Ca    10.0      28 Mar 2022 08:30  Phos  3.0     03-28  Mg     1.90     03-28              T(C): 36.7 (03-28-22 @ 05:04), Max: 36.8 (03-27-22 @ 21:30)  HR: 68 (03-28-22 @ 05:04) (66 - 80)  BP: 138/80 (03-28-22 @ 05:04) (109/68 - 138/80)  RR: 18 (03-28-22 @ 05:04) (18 - 18)  SpO2: 98% (03-28-22 @ 05:04) (98% - 99%)  Wt(kg): --    I&O's Summary      General: Well nourished in no acute distress. Alert and Oriented * 3.   Head: Normocephalic and atraumatic.   Neck: No JVD. No bruits. Supple. Does not appear to be enlarged.   Cardiovascular: + S1,S2 ; RRR Soft systolic murmur at the left lower sternal border. No rubs noted.    Lungs: CTA b/l. No rhonchi, rales or wheezes.   Abdomen: + BS, soft. Non tender. Non distended. No rebound. No guarding.   Extremities: No clubbing/cyanosis/edema.   Neurologic: Moves all four extremities. Full range of motion.   Skin: Warm and moist. The patient's skin has normal elasticity and good skin turgor.   Psychiatric: Appropriate mood and affect.  Musculoskeletal: Normal range of motion, normal strength      TELEMETRY: SR	      ECG:  	NSR    < from: Transthoracic Echocardiogram (03.24.22 @ 09:02) >  ------------------------------------------------------------------------  CONCLUSIONS:  1. Normal mitral valve. Minimal mitral regurgitation.  2. Normal left ventricular internal dimensions and wall  thicknesses.  3. Normal left ventricular systolic function. No segmental  wall motion abnormalities.  4. Mild diastolic dysfunction (Stage I).  5. Normal right ventricular size and function.  ------------------------------------------------------------------------  Confirmed on  3/24/2022 - 10:45:03 by Russell Reyes M.D.,  [facility], [place]    < end of copied text >    < from: Nuclear Stress Test-Exercise (Nuclear Stress Test-Exercise .) (03.24.22 @ 10:56) >  GATED ANALYSIS:  Post-stress gated wall motion analysis was performed (LVEF  = 56 %;LVEDV = 87 ml.), revealing normal LV function with  no segmental wall motion abnoramlities. The RV function  appeared normal.  ------------------------------------------------------------------------  IMPRESSIONS:Abnormal Study  * Myocardial Perfusion SPECT results are abnormal.  * Review of raw data shows: The study is of adequate  technical quality  * The left ventricle was normal in size. There is a medium  sized, moderate defect in apical, inferior and  inferolateral wall that is reversible suggestive of  ischemia. There is a medium sized, mild defect in  anterolateral wall that is reversible, suggestive of  ischemia.  * Post-stress gated wall motion analysis was performed  (LVEF = 56 %;LVEDV = 87 ml.), revealing normal LV function  with no segmental wall motion abnoramlities. The RV  function appeared normal.  ------------------------------------------------------------------------  Confirmed on  3/25/2022 - 11:15:56 by Félix Henley M.D.    < end of copied text >        ASSESSMENT/PLAN:  61-year-old female with asthma, HLD, HTN, DM2, gout, obesity, presenting with 2-3 days of chest pain.    --ACS ruled out with serial CE  --TTE with structurally normal heart  --NST with ischemia as noted above  --[facility] today  --Further workup pending above    Sloan Naidu MD

## 2022-03-28 NOTE — DISCHARGE NOTE PROVIDER - NSDCMRMEDTOKEN_GEN_ALL_CORE_FT
albuterol 90 mcg/inh inhalation aerosol with adapter: 1 dose(s) inhaled , As Needed  allopurinol 300 mg oral tablet: 1 tab(s) orally once a day  diclofenac 1% topical gel:   diclofenac sodium 75 mg oral delayed release tablet: 1 tab(s) orally once a day, As Needed  gabapentin 300 mg oral tablet: 1 tab(s) orally once a day  glimepiride 4 mg oral tablet: 1 tab(s) orally 2 times a day  Januvia 100 mg oral tablet: 1 tab(s) orally once a day  montelukast 10 mg oral tablet: 1 tab(s) orally once a day  simvastatin 40 mg oral tablet: 1 tab(s) orally once a day (at bedtime)  telmisartan 40 mg oral tablet: 1 tab(s) orally once a day  Vitamin D3 25 mcg (1000 intl units) oral tablet: 1 tab(s) orally once a day   albuterol 90 mcg/inh inhalation aerosol with adapter: 1 dose(s) inhaled , As Needed  allopurinol 300 mg oral tablet: 1 tab(s) orally once a day  aspirin 81 mg oral tablet: orally once a day  diclofenac 1% topical gel:   diclofenac sodium 75 mg oral delayed release tablet: 1 tab(s) orally once a day, As Needed  gabapentin 300 mg oral tablet: 1 tab(s) orally once a day  glimepiride 4 mg oral tablet: 1 tab(s) orally 2 times a day  Januvia 100 mg oral tablet: 1 tab(s) orally once a day  montelukast 10 mg oral tablet: 1 tab(s) orally once a day  simvastatin 40 mg oral tablet: 1 tab(s) orally once a day (at bedtime)  telmisartan 40 mg oral tablet: 1 tab(s) orally once a day  Vitamin D3 25 mcg (1000 intl units) oral tablet: 1 tab(s) orally once a day

## 2022-03-28 NOTE — DISCHARGE NOTE PROVIDER - NSDCCAREPROVSEEN_GEN_ALL_CORE_FT
Jesse Stover Sloan Naidu Sloan Naidu  [facility] medicine

## 2022-03-29 ENCOUNTER — TRANSCRIPTION ENCOUNTER (OUTPATIENT)
Age: 62
End: 2022-03-29

## 2022-03-29 VITALS
DIASTOLIC BLOOD PRESSURE: 70 MMHG | OXYGEN SATURATION: 99 % | TEMPERATURE: 97 F | HEART RATE: 76 BPM | SYSTOLIC BLOOD PRESSURE: 134 MMHG | RESPIRATION RATE: 18 BRPM

## 2022-03-29 LAB
ANION GAP SERPL CALC-SCNC: 14 MMOL/L — SIGNIFICANT CHANGE UP (ref 7–14)
BUN SERPL-MCNC: 22 MG/DL — SIGNIFICANT CHANGE UP (ref 7–23)
CALCIUM SERPL-MCNC: 9.8 MG/DL — SIGNIFICANT CHANGE UP (ref 8.4–10.5)
CHLORIDE SERPL-SCNC: 101 MMOL/L — SIGNIFICANT CHANGE UP (ref 98–107)
CO2 SERPL-SCNC: 20 MMOL/L — LOW (ref 22–31)
CREAT SERPL-MCNC: 0.84 MG/DL — SIGNIFICANT CHANGE UP (ref 0.5–1.3)
EGFR: 80 ML/MIN/1.73M2 — SIGNIFICANT CHANGE UP
GLUCOSE BLDC GLUCOMTR-MCNC: 166 MG/DL — HIGH (ref 70–99)
GLUCOSE BLDC GLUCOMTR-MCNC: 271 MG/DL — HIGH (ref 70–99)
GLUCOSE SERPL-MCNC: 218 MG/DL — HIGH (ref 70–99)
HCT VFR BLD CALC: 39.7 % — SIGNIFICANT CHANGE UP (ref 34.5–45)
HGB BLD-MCNC: 12.4 G/DL — SIGNIFICANT CHANGE UP (ref 11.5–15.5)
MAGNESIUM SERPL-MCNC: 2.2 MG/DL — SIGNIFICANT CHANGE UP (ref 1.6–2.6)
MCHC RBC-ENTMCNC: 28 PG — SIGNIFICANT CHANGE UP (ref 27–34)
MCHC RBC-ENTMCNC: 31.2 GM/DL — LOW (ref 32–36)
MCV RBC AUTO: 89.6 FL — SIGNIFICANT CHANGE UP (ref 80–100)
NRBC # BLD: 0 /100 WBCS — SIGNIFICANT CHANGE UP
NRBC # FLD: 0 K/UL — SIGNIFICANT CHANGE UP
PHOSPHATE SERPL-MCNC: 3.6 MG/DL — SIGNIFICANT CHANGE UP (ref 2.5–4.5)
PLATELET # BLD AUTO: 218 K/UL — SIGNIFICANT CHANGE UP (ref 150–400)
POTASSIUM SERPL-MCNC: 4.7 MMOL/L — SIGNIFICANT CHANGE UP (ref 3.5–5.3)
POTASSIUM SERPL-SCNC: 4.7 MMOL/L — SIGNIFICANT CHANGE UP (ref 3.5–5.3)
RBC # BLD: 4.43 M/UL — SIGNIFICANT CHANGE UP (ref 3.8–5.2)
RBC # FLD: 13.7 % — SIGNIFICANT CHANGE UP (ref 10.3–14.5)
SODIUM SERPL-SCNC: 135 MMOL/L — SIGNIFICANT CHANGE UP (ref 135–145)
WBC # BLD: 6.58 K/UL — SIGNIFICANT CHANGE UP (ref 3.8–10.5)
WBC # FLD AUTO: 6.58 K/UL — SIGNIFICANT CHANGE UP (ref 3.8–10.5)

## 2022-03-29 RX ORDER — ACETAMINOPHEN 500 MG
975 TABLET ORAL ONCE
Refills: 0 | Status: COMPLETED | OUTPATIENT
Start: 2022-03-29 | End: 2022-03-29

## 2022-03-29 RX ADMIN — Medication 300 MILLIGRAM(S): at 12:10

## 2022-03-29 RX ADMIN — Medication 3: at 08:41

## 2022-03-29 RX ADMIN — GABAPENTIN 300 MILLIGRAM(S): 400 CAPSULE ORAL at 12:10

## 2022-03-29 RX ADMIN — LIDOCAINE 1 PATCH: 4 CREAM TOPICAL at 09:43

## 2022-03-29 RX ADMIN — Medication 975 MILLIGRAM(S): at 08:38

## 2022-03-29 RX ADMIN — Medication 975 MILLIGRAM(S): at 07:38

## 2022-03-29 RX ADMIN — Medication 1000 MILLIGRAM(S): at 00:32

## 2022-03-29 RX ADMIN — Medication 1: at 12:34

## 2022-03-29 RX ADMIN — MONTELUKAST 10 MILLIGRAM(S): 4 TABLET, CHEWABLE ORAL at 12:10

## 2022-03-29 RX ADMIN — LIDOCAINE 1 PATCH: 4 CREAM TOPICAL at 11:18

## 2022-03-29 RX ADMIN — LOSARTAN POTASSIUM 50 MILLIGRAM(S): 100 TABLET, FILM COATED ORAL at 05:21

## 2022-03-29 NOTE — PROGRESS NOTE ADULT - SUBJECTIVE AND OBJECTIVE BOX
date of service 3/29/22    HISTORY OF PRESENT ILLNESS: HPI:  61-year-old female with asthma, HLD, HTN, DM2, gout, obesity, presenting with 2-3 days of chest pain. Patient cannot recall any inciting incident when pain started however notes it has been constant 7-8/10 in severity, non-radiating, substernal, described "as a ball." Pain was keeping patient from sleeping last night. Pain is occasionally worse with taking a deep breath. She reports no history of similar pain in past. She took acetaminophen for pain at home without relief. She reports no relief with medications received in the ED. She does not take ASA, hormones, denies any recent travel. No history of DVT/PE, no recent travel. No recent changes in diet. She reports occasional sharp pain to proximal to R shoulder posteriorly with movement.    S: no chest pain or sob; ros otherwise negative.            Review of Systems:   Constitutional: [ ] fevers, [ ] chills.   Skin: [ ] dry skin. [ ] rashes.  Psychiatric: [ ] depression, [ ] anxiety.   Gastrointestinal: [ ] BRBPR, [ ] melena.   Neurological: [ ] confusion. [ ] seizures. [ ] shuffling gait.   Ears,Nose,Mouth and Throat: [ ] ear pain [ ] sore throat.   Eyes: [ ] diplopia.   Respiratory: [ ] hemoptysis. [ ] shortness of breath  Cardiovascular: See HPI above  Hematologic/Lymphatic: [ ] anemia. [ ] painful nodes. [ ] prolonged bleeding.   Genitourinary: [ ] hematuria. [ ] flank pain.   Endocrine: [ ] significant change in weight. [ ] intolerance to heat and cold.     Review of systems [x ] otherwise negative, [ ] otherwise unable to obtain    FH: no family history of sudden cardiac death in first degree relatives    SH: [ ] tobacco, [ ] alcohol, [ ] drugs         ALBUTerol    90 MICROgram(s) HFA Inhaler 2 Puff(s) Inhalation every 6 hours PRN  allopurinol 300 milliGRAM(s) Oral daily  calcium carbonate    500 mG (Tums) Chewable 2 Tablet(s) Chew every 6 hours PRN  dextrose 40% Gel 15 Gram(s) Oral once  dextrose 5%. 1000 milliLiter(s) IV Continuous <Continuous>  dextrose 5%. 1000 milliLiter(s) IV Continuous <Continuous>  dextrose 50% Injectable 25 Gram(s) IV Push once  dextrose 50% Injectable 12.5 Gram(s) IV Push once  dextrose 50% Injectable 25 Gram(s) IV Push once  gabapentin 300 milliGRAM(s) Oral daily  glucagon  Injectable 1 milliGRAM(s) IntraMuscular once  heparin   Injectable 5000 Unit(s) SubCutaneous every 8 hours  insulin lispro (ADMELOG) corrective regimen sliding scale   SubCutaneous three times a day before meals  lidocaine   4% Patch 1 Patch Transdermal every 24 hours PRN  losartan 50 milliGRAM(s) Oral daily  montelukast 10 milliGRAM(s) Oral daily  nitroglycerin     SubLingual 0.4 milliGRAM(s) SubLingual every 5 minutes PRN  polyethylene glycol 3350 17 Gram(s) Oral daily  senna 2 Tablet(s) Oral at bedtime  simethicone 80 milliGRAM(s) Chew every 6 hours PRN  simvastatin 40 milliGRAM(s) Oral at bedtime  sodium chloride 0.9%. 500 milliLiter(s) IV Continuous <Continuous>                            12.4   6.58  )-----------( 218      ( 29 Mar 2022 07:47 )             39.7       03-29    135  |  101  |  22  ----------------------------<  218<H>  4.7   |  20<L>  |  0.84    Ca    9.8      29 Mar 2022 07:47  Phos  3.6     03-29  Mg     2.20     03-29              T(C): 36 (03-29-22 @ 12:37), Max: 36.8 (03-28-22 @ 21:10)  HR: 74 (03-29-22 @ 12:37) (65 - 97)  BP: 121/58 (03-29-22 @ 12:37) (110/63 - 121/58)  RR: 18 (03-29-22 @ 12:37) (18 - 18)  SpO2: 100% (03-29-22 @ 12:37) (98% - 100%)  Wt(kg): --    I&O's Summary    29 Mar 2022 07:01  -  29 Mar 2022 13:26  --------------------------------------------------------  IN: 200 mL / OUT: 0 mL / NET: 200 mL          General: Well nourished in no acute distress. Alert and Oriented * 3.   Head: Normocephalic and atraumatic.   Neck: No JVD. No bruits. Supple. Does not appear to be enlarged.   Cardiovascular: + S1,S2 ; RRR Soft systolic murmur at the left lower sternal border. No rubs noted.    Lungs: CTA b/l. No rhonchi, rales or wheezes.   Abdomen: + BS, soft. Non tender. Non distended. No rebound. No guarding.   Extremities: No clubbing/cyanosis/edema.   Neurologic: Moves all four extremities. Full range of motion.   Skin: Warm and moist. The patient's skin has normal elasticity and good skin turgor.   Psychiatric: Appropriate mood and affect.  Musculoskeletal: Normal range of motion, normal strength  Right radial artery site: no hematoma; 2+ pulses       TELEMETRY: SR	      ECG:  	NSR    < from: Transthoracic Echocardiogram (03.24.22 @ 09:02) >  ------------------------------------------------------------------------  CONCLUSIONS:  1. Normal mitral valve. Minimal mitral regurgitation.  2. Normal left ventricular internal dimensions and wall  thicknesses.  3. Normal left ventricular systolic function. No segmental  wall motion abnormalities.  4. Mild diastolic dysfunction (Stage I).  5. Normal right ventricular size and function.  ------------------------------------------------------------------------  Confirmed on  3/24/2022 - 10:45:03 by Russell Reyes M.D.,  [facility], [place]    < end of copied text >    < from: Nuclear Stress Test-Exercise (Nuclear Stress Test-Exercise .) (03.24.22 @ 10:56) >  GATED ANALYSIS:  Post-stress gated wall motion analysis was performed (LVEF  = 56 %;LVEDV = 87 ml.), revealing normal LV function with  no segmental wall motion abnoramlities. The RV function  appeared normal.  ------------------------------------------------------------------------  IMPRESSIONS:Abnormal Study  * Myocardial Perfusion SPECT results are abnormal.  * Review of raw data shows: The study is of adequate  technical quality  * The left ventricle was normal in size. There is a medium  sized, moderate defect in apical, inferior and  inferolateral wall that is reversible suggestive of  ischemia. There is a medium sized, mild defect in  anterolateral wall that is reversible, suggestive of  ischemia.  * Post-stress gated wall motion analysis was performed  (LVEF = 56 %;LVEDV = 87 ml.), revealing normal LV function  with no segmental wall motion abnoramlities. The RV  function appeared normal.  ------------------------------------------------------------------------  Confirmed on  3/25/2022 - 11:15:56 by Félix Henley M.D.    < end of copied text >        ASSESSMENT/PLAN:  61-year-old female with asthma, HLD, HTN, DM2, gout, obesity, presenting with 2-3 days of chest pain.    --ACS ruled out with serial CE  --TTE with structurally normal heart  --NST with ischemia as noted above  --LHC performed demonstrating mild non-obstructive CAD - medical management with asa/statin recommended  --No further inpatient cardiac workup needed at this time.   --DC home - pt. to follow up with her primary cardiologist Dr. Figueroa after discharge     Sloan Naidu MD

## 2022-03-29 NOTE — DISCHARGE NOTE NURSING/CASE MANAGEMENT/SOCIAL WORK - PATIENT PORTAL LINK FT
You can access the FollowMyHealth Patient Portal offered by [facility] by registering at the following website: [email or web address]. By joining [facility]’s FollowMyHealth portal, you will also be able to view your health information using other applications (apps) compatible with our system.

## 2022-03-29 NOTE — DISCHARGE NOTE NURSING/CASE MANAGEMENT/SOCIAL WORK - NSDCPEFALRISK_GEN_ALL_CORE
For information on Fall & Injury Prevention, visit: [email or web address] OR  [email or web address] OR  https://www.cdc.gov/steadi/patient.html

## 2022-04-06 ENCOUNTER — APPOINTMENT (OUTPATIENT)
Dept: ORTHOPEDIC SURGERY | Facility: CLINIC | Age: 62
End: 2022-04-06

## 2022-05-17 ENCOUNTER — APPOINTMENT (OUTPATIENT)
Dept: ORTHOPEDIC SURGERY | Facility: CLINIC | Age: 62
End: 2022-05-17
Payer: COMMERCIAL

## 2022-05-17 DIAGNOSIS — M75.01 ADHESIVE CAPSULITIS OF RIGHT SHOULDER: ICD-10-CM

## 2022-05-17 DIAGNOSIS — M75.02 ADHESIVE CAPSULITIS OF RIGHT SHOULDER: ICD-10-CM

## 2022-05-17 PROCEDURE — J3490M: CUSTOM

## 2022-05-17 PROCEDURE — 73030 X-RAY EXAM OF SHOULDER: CPT | Mod: LT

## 2022-05-17 PROCEDURE — 99213 OFFICE O/P EST LOW 20 MIN: CPT | Mod: 25

## 2022-05-17 PROCEDURE — 20610 DRAIN/INJ JOINT/BURSA W/O US: CPT | Mod: LT

## 2022-05-17 NOTE — PHYSICAL EXAM
[Left] : left shoulder [] : positive [place] [[id number]] : active forward flexion 145 degrees

## 2022-05-17 NOTE — PROCEDURE
[Left] : of the left [Subacromial Space] : subacromial space [Alcohol] : alcohol [Betadine] : betadine [Ethyl Chloride sprayed topically] : ethyl chloride sprayed topically [___ cc    0.25%] : Bupivacaine (Marcaine) ~Vcc of 0.25%  [___ cc    40mg] : Methylprednisolone (Depomedrol) ~Vcc of 40 mg

## 2022-05-17 NOTE — HISTORY OF PRESENT ILLNESS
[9] : 9 [7] : 7 [Localized] : localized [Sharp] : sharp [Throbbing] : throbbing [Leisure] : leisure [Social interactions] : social interactions [Rest] : rest [de-identified] : Pt is a 60yo RHD F who presents today for eval of her b/l shoulders (L>R). Pt states that she has pain bilaterally for several months, where it got better then recently got worse. Seen at  and did some PT which helped. Unable to sleep at night. Throbbing. Denies recent trauma. No N/T. Last seen 3/14/22 by  where she was given a CSI with relief. Reports last A1C was 7 and DM is under control at this time. \par \par  [] : Post Surgical Visit: no [FreeTextEntry1] : B/L Shoulders [de-identified] : activity

## 2022-05-17 NOTE — ASSESSMENT
[FreeTextEntry1] : 60 y/o F with history of b/l shoulder tendinitis. Seen in the past for RC tears. Has undergone CSI in the past with good relieve. L shoulder CSI given today. Patient advised to follow up with Dr. Simmons.

## 2022-06-01 ENCOUNTER — APPOINTMENT (OUTPATIENT)
Dept: ORTHOPEDIC SURGERY | Facility: CLINIC | Age: 62
End: 2022-06-01
Payer: COMMERCIAL

## 2022-06-01 VITALS — HEIGHT: 64 IN | BODY MASS INDEX: 47.63 KG/M2 | WEIGHT: 279 LBS

## 2022-06-01 DIAGNOSIS — M75.51 BURSITIS OF RIGHT SHOULDER: ICD-10-CM

## 2022-06-01 PROCEDURE — 99214 OFFICE O/P EST MOD 30 MIN: CPT

## 2022-06-01 PROCEDURE — 73010 X-RAY EXAM OF SHOULDER BLADE: CPT | Mod: 50

## 2022-06-01 PROCEDURE — 73030 X-RAY EXAM OF SHOULDER: CPT | Mod: 50

## 2022-06-01 RX ORDER — METHYLPREDNISOLONE 4 MG/1
4 TABLET ORAL
Qty: 1 | Refills: 0 | Status: ACTIVE | COMMUNITY
Start: 2022-06-01 | End: 1900-01-01

## 2022-06-01 NOTE — HISTORY OF PRESENT ILLNESS
[5] : 5 [2] : 2 [Dull/Aching] : dull/aching [Sharp] : sharp [Intermittent] : intermittent [Rest] : rest [de-identified] : 6/1/22: 62 yo RHD female with bilateral shoulder pain. Left is worse than right. She was seen at [facility] and was given CSI in March and on 5/17/22. She tried PT but it worsened symptoms. She takes diclofenac. \par \par 7/17/19: Here for follow up. She was in PT and she did improve. She stopped because she is saving her visits. She had\par the cortisone and she did get some relief.\par \par 5/22/19: She is here for follow up, she feels slightly improved. She is in PT with improvement.\par 4/10/19: 59 y/o RHD female with right shoulder pain since 2016. She denies specific injury. She state pain increased this\par past year. The worst pain is with reaching and raising her arm overhead. She is in PT with minimal relief. There is\par discomfort at night. She last had a cortisone injection in Feb 2019 with no relief. She takes Aleve twice a day. She saw\par Dr. Babb and is here for surgical consult.\par \par PMHx: HBP, DMII a1c 8\par \par MRI R shoulder: Full-thickness rotator cuff tear as detailed above. Joint and bursal effusions. Cuff muscle atrophy as\par noted. Chronic posterior labrum tear, with a Pryor lesion. Moderate AC joint osteoarthritis, with an accompanying\par pararticular ossification. [FreeTextEntry5] : pt feels pain in bill shoulders.  pt has limited range of motion in the left shoulder. pt feels discomfort in bill shoulders.   [FreeTextEntry9] : tylenol  [de-identified] : motion

## 2022-06-01 NOTE — PHYSICAL EXAM
[5 ___] : forward flexion 5[unfilled]/5 [5___] : internal rotation 5[unfilled]/5 [] : motor and sensory intact distally [Bilateral] : shoulder bilaterally [There are no fractures, subluxations or dislocations. No significant abnormalities are seen] : There are no fractures, subluxations or dislocations. No significant abnormalities are seen [FreeTextEntry9] :  b/l\par ER R 20 L 30 [de-identified] : ER L 4 R 5

## 2022-06-01 NOTE — ASSESSMENT
[FreeTextEntry1] : Bilateral shoulder pain, h/o R RCT.\par Two injections done at urgent care, last 5/4/22 with mild relief.\par Recommend new MRIs to evaluate RCTs.\par MDP sent. \par RTO for review

## 2022-06-06 ENCOUNTER — APPOINTMENT (OUTPATIENT)
Dept: MRI IMAGING | Facility: CLINIC | Age: 62
End: 2022-06-06

## 2022-06-06 ENCOUNTER — OUTPATIENT (OUTPATIENT)
Dept: OUTPATIENT SERVICES | Facility: HOSPITAL | Age: 62
LOS: 1 days | End: 2022-06-06
Payer: COMMERCIAL

## 2022-06-06 VITALS
HEIGHT: 64 IN | SYSTOLIC BLOOD PRESSURE: 143 MMHG | HEART RATE: 75 BPM | OXYGEN SATURATION: 98 % | RESPIRATION RATE: 16 BRPM | WEIGHT: 268.08 LBS | DIASTOLIC BLOOD PRESSURE: 80 MMHG | TEMPERATURE: 97 F

## 2022-06-06 DIAGNOSIS — E11.9 TYPE 2 DIABETES MELLITUS WITHOUT COMPLICATIONS: ICD-10-CM

## 2022-06-06 DIAGNOSIS — N83.209 UNSPECIFIED OVARIAN CYST, UNSPECIFIED SIDE: Chronic | ICD-10-CM

## 2022-06-06 DIAGNOSIS — K57.32 DIVERTICULITIS OF LARGE INTESTINE WITHOUT PERFORATION OR ABSCESS WITHOUT BLEEDING: ICD-10-CM

## 2022-06-06 DIAGNOSIS — M10.9 GOUT, UNSPECIFIED: ICD-10-CM

## 2022-06-06 DIAGNOSIS — D25.9 LEIOMYOMA OF UTERUS, UNSPECIFIED: Chronic | ICD-10-CM

## 2022-06-06 DIAGNOSIS — K22.2 ESOPHAGEAL OBSTRUCTION: ICD-10-CM

## 2022-06-06 DIAGNOSIS — I10 ESSENTIAL (PRIMARY) HYPERTENSION: ICD-10-CM

## 2022-06-06 DIAGNOSIS — K21.00 GASTRO-ESOPHAGEAL REFLUX DISEASE WITH ESOPHAGITIS, WITHOUT BLEEDING: ICD-10-CM

## 2022-06-06 DIAGNOSIS — E66.9 OBESITY, UNSPECIFIED: ICD-10-CM

## 2022-06-06 DIAGNOSIS — Z86.010 PERSONAL HISTORY OF COLONIC POLYPS: ICD-10-CM

## 2022-06-06 DIAGNOSIS — R13.10 DYSPHAGIA, UNSPECIFIED: ICD-10-CM

## 2022-06-06 LAB
A1C WITH ESTIMATED AVERAGE GLUCOSE RESULT: 7.5 % — HIGH (ref 4–5.6)
ANION GAP SERPL CALC-SCNC: 15 MMOL/L — HIGH (ref 7–14)
BUN SERPL-MCNC: 32 MG/DL — HIGH (ref 7–23)
CALCIUM SERPL-MCNC: 10.4 MG/DL — SIGNIFICANT CHANGE UP (ref 8.4–10.5)
CHLORIDE SERPL-SCNC: 101 MMOL/L — SIGNIFICANT CHANGE UP (ref 98–107)
CO2 SERPL-SCNC: 23 MMOL/L — SIGNIFICANT CHANGE UP (ref 22–31)
CREAT SERPL-MCNC: 0.91 MG/DL — SIGNIFICANT CHANGE UP (ref 0.5–1.3)
EGFR: 72 ML/MIN/1.73M2 — SIGNIFICANT CHANGE UP
ESTIMATED AVERAGE GLUCOSE: 169 — SIGNIFICANT CHANGE UP
GLUCOSE SERPL-MCNC: 59 MG/DL — LOW (ref 70–99)
HCT VFR BLD CALC: 41.2 % — SIGNIFICANT CHANGE UP (ref 34.5–45)
HGB BLD-MCNC: 12.7 G/DL — SIGNIFICANT CHANGE UP (ref 11.5–15.5)
MCHC RBC-ENTMCNC: 27.7 PG — SIGNIFICANT CHANGE UP (ref 27–34)
MCHC RBC-ENTMCNC: 30.8 GM/DL — LOW (ref 32–36)
MCV RBC AUTO: 90 FL — SIGNIFICANT CHANGE UP (ref 80–100)
NRBC # BLD: 0 /100 WBCS — SIGNIFICANT CHANGE UP
NRBC # FLD: 0 K/UL — SIGNIFICANT CHANGE UP
PLATELET # BLD AUTO: 279 K/UL — SIGNIFICANT CHANGE UP (ref 150–400)
POTASSIUM SERPL-MCNC: 4.1 MMOL/L — SIGNIFICANT CHANGE UP (ref 3.5–5.3)
POTASSIUM SERPL-SCNC: 4.1 MMOL/L — SIGNIFICANT CHANGE UP (ref 3.5–5.3)
RBC # BLD: 4.58 M/UL — SIGNIFICANT CHANGE UP (ref 3.8–5.2)
RBC # FLD: 14.6 % — HIGH (ref 10.3–14.5)
SODIUM SERPL-SCNC: 139 MMOL/L — SIGNIFICANT CHANGE UP (ref 135–145)
WBC # BLD: 11.77 K/UL — HIGH (ref 3.8–10.5)
WBC # FLD AUTO: 11.77 K/UL — HIGH (ref 3.8–10.5)

## 2022-06-06 PROCEDURE — 93010 ELECTROCARDIOGRAM REPORT: CPT

## 2022-06-06 RX ORDER — DICLOFENAC SODIUM 30 MG/G
0 GEL TOPICAL
Qty: 0 | Refills: 0 | DISCHARGE

## 2022-06-06 RX ORDER — ALBUTEROL 90 UG/1
1 AEROSOL, METERED ORAL
Qty: 0 | Refills: 0 | DISCHARGE

## 2022-06-06 RX ORDER — ASPIRIN/CALCIUM CARB/MAGNESIUM 324 MG
0 TABLET ORAL
Qty: 0 | Refills: 0 | DISCHARGE

## 2022-06-06 RX ORDER — ALLOPURINOL 300 MG
1 TABLET ORAL
Qty: 0 | Refills: 0 | DISCHARGE

## 2022-06-06 RX ORDER — GABAPENTIN 400 MG/1
1 CAPSULE ORAL
Qty: 0 | Refills: 0 | DISCHARGE

## 2022-06-06 RX ORDER — MONTELUKAST 4 MG/1
1 TABLET, CHEWABLE ORAL
Qty: 0 | Refills: 0 | DISCHARGE

## 2022-06-06 RX ORDER — SIMVASTATIN 20 MG/1
1 TABLET, FILM COATED ORAL
Qty: 0 | Refills: 0 | DISCHARGE

## 2022-06-06 RX ORDER — CHOLECALCIFEROL (VITAMIN D3) 125 MCG
1 CAPSULE ORAL
Qty: 0 | Refills: 0 | DISCHARGE

## 2022-06-06 RX ORDER — SITAGLIPTIN 50 MG/1
1 TABLET, FILM COATED ORAL
Qty: 0 | Refills: 0 | DISCHARGE

## 2022-06-06 RX ORDER — DICLOFENAC SODIUM 75 MG/1
1 TABLET, DELAYED RELEASE ORAL
Qty: 0 | Refills: 0 | DISCHARGE

## 2022-06-06 RX ORDER — GLIMEPIRIDE 1 MG
1 TABLET ORAL
Qty: 0 | Refills: 0 | DISCHARGE

## 2022-06-06 RX ORDER — TELMISARTAN 20 MG/1
1 TABLET ORAL
Qty: 0 | Refills: 0 | DISCHARGE

## 2022-06-06 NOTE — H&P PST ADULT - NSICDXFAMILYHX_GEN_ALL_CORE_FT
FAMILY HISTORY:  Father  Still living? Unknown  Family history of diabetes mellitus (DM), Age at diagnosis: Age Unknown  FH: HTN (hypertension), Age at diagnosis: Age Unknown    Mother  Still living? Unknown  Family history of DVT, Age at diagnosis: Age Unknown    Sibling  Still living? No  Family history of DVT, Age at diagnosis: Age Unknown  FH: colon cancer, Age at diagnosis: Age Unknown

## 2022-06-06 NOTE — H&P PST ADULT - NSICDXPASTMEDICALHX_GEN_ALL_CORE_FT
PAST MEDICAL HISTORY:  Asthma     Diabetes type 2    GERD (gastroesophageal reflux disease)     Gout     History of diverticulitis 1/2022    HLD (hyperlipidemia)     HTN (hypertension)     Personal history of colonic polyps

## 2022-06-06 NOTE — H&P PST ADULT - PROBLEM SELECTOR PLAN 4
Pt instructed to take allopurinol AM of surgery with a sip of water, pt able to verbalize understanding.

## 2022-06-06 NOTE — H&P PST ADULT - NSANTHOSAYNRD_GEN_A_CORE
No. MEE screening performed.  STOP BANG Legend: 0-2 = LOW Risk; 3-4 = INTERMEDIATE Risk; 5-8 = HIGH Risk

## 2022-06-06 NOTE — H&P PST ADULT - PROBLEM SELECTOR PLAN 2
Pt instructed to take telmisartan AM of surgery with a sip of water, pt able to verbalize understanding.   MEE precautions.

## 2022-06-06 NOTE — H&P PST ADULT - HISTORY OF PRESENT ILLNESS
62 yo female with preop dx. personal history of colonic polyps presents to pre surgical testing.  Pt with h/o diverticulitis 1/2022 and family history of colon CA.  Pt is scheduled for gastroendoscopy colonoscopy.

## 2022-06-06 NOTE — H&P PST ADULT - RS GEN PE MLT RESP DETAILS PC
breath sounds equal/good air movement/respirations non-labored/clear to auscultation bilaterally good air movement/respirations non-labored/clear to auscultation bilaterally

## 2022-06-06 NOTE — H&P PST ADULT - PROBLEM SELECTOR PLAN 1
Pt is scheduled for gastroendoscopy colonoscopy on 6/20/22.  Verbal and written pre op instructions reviewed with patient and pt able to verbalize understanding. Pt instructed to follow surgeon's guidelines regarding COVID testing preop.  Pt instructed to take famotidine AM of surgery with a sip of water, pt able to verbalize understanding.

## 2022-06-07 ENCOUNTER — APPOINTMENT (OUTPATIENT)
Dept: MRI IMAGING | Facility: CLINIC | Age: 62
End: 2022-06-07

## 2022-06-15 ENCOUNTER — APPOINTMENT (OUTPATIENT)
Dept: ORTHOPEDIC SURGERY | Facility: CLINIC | Age: 62
End: 2022-06-15

## 2022-06-16 DIAGNOSIS — Z01.818 ENCOUNTER FOR OTHER PREPROCEDURAL EXAMINATION: ICD-10-CM

## 2022-06-20 ENCOUNTER — OUTPATIENT (OUTPATIENT)
Dept: OUTPATIENT SERVICES | Facility: HOSPITAL | Age: 62
LOS: 1 days | Discharge: HOME | End: 2022-06-20
Payer: COMMERCIAL

## 2022-06-20 ENCOUNTER — APPOINTMENT (OUTPATIENT)
Dept: GASTROENTEROLOGY | Facility: HOSPITAL | Age: 62
End: 2022-06-20

## 2022-06-20 ENCOUNTER — RESULT REVIEW (OUTPATIENT)
Age: 62
End: 2022-06-20

## 2022-06-20 VITALS
OXYGEN SATURATION: 100 % | RESPIRATION RATE: 12 BRPM | HEART RATE: 75 BPM | SYSTOLIC BLOOD PRESSURE: 119 MMHG | DIASTOLIC BLOOD PRESSURE: 67 MMHG

## 2022-06-20 VITALS
DIASTOLIC BLOOD PRESSURE: 77 MMHG | SYSTOLIC BLOOD PRESSURE: 145 MMHG | WEIGHT: 270.07 LBS | TEMPERATURE: 97 F | OXYGEN SATURATION: 100 % | RESPIRATION RATE: 18 BRPM | HEIGHT: 64 IN | HEART RATE: 76 BPM

## 2022-06-20 DIAGNOSIS — K57.32 DIVERTICULITIS OF LARGE INTESTINE W/OUT PERFORATION OR ABSCESS W/OUT BLEEDING: ICD-10-CM

## 2022-06-20 DIAGNOSIS — Z86.010 PERSONAL HISTORY OF COLONIC POLYPS: ICD-10-CM

## 2022-06-20 DIAGNOSIS — D25.9 LEIOMYOMA OF UTERUS, UNSPECIFIED: Chronic | ICD-10-CM

## 2022-06-20 DIAGNOSIS — N83.209 UNSPECIFIED OVARIAN CYST, UNSPECIFIED SIDE: Chronic | ICD-10-CM

## 2022-06-20 PROCEDURE — 44394 COLONOSCOPY W/SNARE: CPT

## 2022-06-20 PROCEDURE — 43239 EGD BIOPSY SINGLE/MULTIPLE: CPT | Mod: 59

## 2022-06-20 PROCEDURE — 88305 TISSUE EXAM BY PATHOLOGIST: CPT | Mod: 26

## 2022-06-20 NOTE — ASU PATIENT PROFILE, ADULT - PAIN SCALE PREFERRED, PROFILE
none
Alert-The patient is alert, awake and responds to voice. The patient is oriented to time, place, and person. The triage nurse is able to obtain subjective information.

## 2022-06-23 LAB — SURGICAL PATHOLOGY STUDY: SIGNIFICANT CHANGE UP

## 2022-06-26 ENCOUNTER — EMERGENCY (EMERGENCY)
Facility: HOSPITAL | Age: 62
LOS: 1 days | Discharge: HOME | End: 2022-06-26
Attending: EMERGENCY MEDICINE | Admitting: EMERGENCY MEDICINE
Payer: COMMERCIAL

## 2022-06-26 VITALS
OXYGEN SATURATION: 100 % | RESPIRATION RATE: 18 BRPM | HEART RATE: 100 BPM | TEMPERATURE: 98 F | HEIGHT: 64 IN | SYSTOLIC BLOOD PRESSURE: 110 MMHG | DIASTOLIC BLOOD PRESSURE: 95 MMHG

## 2022-06-26 DIAGNOSIS — N83.209 UNSPECIFIED OVARIAN CYST, UNSPECIFIED SIDE: Chronic | ICD-10-CM

## 2022-06-26 DIAGNOSIS — D25.9 LEIOMYOMA OF UTERUS, UNSPECIFIED: Chronic | ICD-10-CM

## 2022-06-26 PROBLEM — Z86.010 PERSONAL HISTORY OF COLONIC POLYPS: Chronic | Status: ACTIVE | Noted: 2022-06-06

## 2022-06-26 PROBLEM — K21.9 GASTRO-ESOPHAGEAL REFLUX DISEASE WITHOUT ESOPHAGITIS: Chronic | Status: ACTIVE | Noted: 2022-06-06

## 2022-06-26 PROBLEM — M10.9 GOUT, UNSPECIFIED: Chronic | Status: ACTIVE | Noted: 2022-06-06

## 2022-06-26 PROBLEM — Z87.19 PERSONAL HISTORY OF OTHER DISEASES OF THE DIGESTIVE SYSTEM: Chronic | Status: ACTIVE | Noted: 2022-06-06

## 2022-06-26 PROBLEM — E11.9 TYPE 2 DIABETES MELLITUS WITHOUT COMPLICATIONS: Chronic | Status: ACTIVE | Noted: 2020-08-03

## 2022-06-26 LAB
ALBUMIN SERPL ELPH-MCNC: 4.3 G/DL — SIGNIFICANT CHANGE UP (ref 3.3–5)
ALP SERPL-CCNC: 111 U/L — SIGNIFICANT CHANGE UP (ref 40–120)
ALT FLD-CCNC: 20 U/L — SIGNIFICANT CHANGE UP (ref 4–33)
ANION GAP SERPL CALC-SCNC: 14 MMOL/L — SIGNIFICANT CHANGE UP (ref 7–14)
AST SERPL-CCNC: 14 U/L — SIGNIFICANT CHANGE UP (ref 4–32)
BASOPHILS # BLD AUTO: 0.02 K/UL — SIGNIFICANT CHANGE UP (ref 0–0.2)
BASOPHILS NFR BLD AUTO: 0.4 % — SIGNIFICANT CHANGE UP (ref 0–2)
BILIRUB SERPL-MCNC: 0.4 MG/DL — SIGNIFICANT CHANGE UP (ref 0.2–1.2)
BUN SERPL-MCNC: 19 MG/DL — SIGNIFICANT CHANGE UP (ref 7–23)
CALCIUM SERPL-MCNC: 10 MG/DL — SIGNIFICANT CHANGE UP (ref 8.4–10.5)
CHLORIDE SERPL-SCNC: 104 MMOL/L — SIGNIFICANT CHANGE UP (ref 98–107)
CO2 SERPL-SCNC: 21 MMOL/L — LOW (ref 22–31)
CREAT SERPL-MCNC: 0.96 MG/DL — SIGNIFICANT CHANGE UP (ref 0.5–1.3)
EGFR: 67 ML/MIN/1.73M2 — SIGNIFICANT CHANGE UP
EOSINOPHIL # BLD AUTO: 0.03 K/UL — SIGNIFICANT CHANGE UP (ref 0–0.5)
EOSINOPHIL NFR BLD AUTO: 0.6 % — SIGNIFICANT CHANGE UP (ref 0–6)
GLUCOSE SERPL-MCNC: 124 MG/DL — HIGH (ref 70–99)
HCT VFR BLD CALC: 37.3 % — SIGNIFICANT CHANGE UP (ref 34.5–45)
HGB BLD-MCNC: 11.7 G/DL — SIGNIFICANT CHANGE UP (ref 11.5–15.5)
IANC: 2.25 K/UL — SIGNIFICANT CHANGE UP (ref 1.8–7.4)
IMM GRANULOCYTES NFR BLD AUTO: 0.2 % — SIGNIFICANT CHANGE UP (ref 0–1.5)
LIDOCAIN IGE QN: 44 U/L — SIGNIFICANT CHANGE UP (ref 7–60)
LYMPHOCYTES # BLD AUTO: 1.8 K/UL — SIGNIFICANT CHANGE UP (ref 1–3.3)
LYMPHOCYTES # BLD AUTO: 38.8 % — SIGNIFICANT CHANGE UP (ref 13–44)
MCHC RBC-ENTMCNC: 28.3 PG — SIGNIFICANT CHANGE UP (ref 27–34)
MCHC RBC-ENTMCNC: 31.4 GM/DL — LOW (ref 32–36)
MCV RBC AUTO: 90.1 FL — SIGNIFICANT CHANGE UP (ref 80–100)
MONOCYTES # BLD AUTO: 0.53 K/UL — SIGNIFICANT CHANGE UP (ref 0–0.9)
MONOCYTES NFR BLD AUTO: 11.4 % — SIGNIFICANT CHANGE UP (ref 2–14)
NEUTROPHILS # BLD AUTO: 2.25 K/UL — SIGNIFICANT CHANGE UP (ref 1.8–7.4)
NEUTROPHILS NFR BLD AUTO: 48.6 % — SIGNIFICANT CHANGE UP (ref 43–77)
NRBC # BLD: 0 /100 WBCS — SIGNIFICANT CHANGE UP
NRBC # FLD: 0 K/UL — SIGNIFICANT CHANGE UP
PLATELET # BLD AUTO: 217 K/UL — SIGNIFICANT CHANGE UP (ref 150–400)
POTASSIUM SERPL-MCNC: 4.1 MMOL/L — SIGNIFICANT CHANGE UP (ref 3.5–5.3)
POTASSIUM SERPL-SCNC: 4.1 MMOL/L — SIGNIFICANT CHANGE UP (ref 3.5–5.3)
PROT SERPL-MCNC: 7.6 G/DL — SIGNIFICANT CHANGE UP (ref 6–8.3)
RBC # BLD: 4.14 M/UL — SIGNIFICANT CHANGE UP (ref 3.8–5.2)
RBC # FLD: 14.9 % — HIGH (ref 10.3–14.5)
SODIUM SERPL-SCNC: 139 MMOL/L — SIGNIFICANT CHANGE UP (ref 135–145)
WBC # BLD: 4.64 K/UL — SIGNIFICANT CHANGE UP (ref 3.8–10.5)
WBC # FLD AUTO: 4.64 K/UL — SIGNIFICANT CHANGE UP (ref 3.8–10.5)

## 2022-06-26 PROCEDURE — 74177 CT ABD & PELVIS W/CONTRAST: CPT | Mod: 26,MA

## 2022-06-26 PROCEDURE — 99285 EMERGENCY DEPT VISIT HI MDM: CPT

## 2022-06-26 RX ORDER — SODIUM CHLORIDE 9 MG/ML
1000 INJECTION INTRAMUSCULAR; INTRAVENOUS; SUBCUTANEOUS ONCE
Refills: 0 | Status: COMPLETED | OUTPATIENT
Start: 2022-06-26 | End: 2022-06-26

## 2022-06-26 RX ORDER — MORPHINE SULFATE 50 MG/1
4 CAPSULE, EXTENDED RELEASE ORAL ONCE
Refills: 0 | Status: DISCONTINUED | OUTPATIENT
Start: 2022-06-26 | End: 2022-06-26

## 2022-06-26 RX ADMIN — MORPHINE SULFATE 4 MILLIGRAM(S): 50 CAPSULE, EXTENDED RELEASE ORAL at 11:28

## 2022-06-26 RX ADMIN — SODIUM CHLORIDE 1000 MILLILITER(S): 9 INJECTION INTRAMUSCULAR; INTRAVENOUS; SUBCUTANEOUS at 11:28

## 2022-06-26 NOTE — ED ADULT TRIAGE NOTE - CHIEF COMPLAINT QUOTE
AB pain with diarrhea since Thursday, PT denies any N/V or fever. PMH diverticulitis, HTN, DMII, HLD, asthma

## 2022-06-26 NOTE — ED PROVIDER NOTE - NSFOLLOWUPINSTRUCTIONS_ED_ALL_ED_FT
take augmentin 1 tab twice a day for 10 days for diverticulitis  if you feel you are developing vaginal itching apply VAGISIL cream    Return to ER for high grade fevers, significant worsening of pain or any other symptoms where you feel you need immediate attention      Diverticulitis       Diverticulitis is when small pouches in your colon (large intestine) get infected or swollen. This causes pain in the belly (abdomen) and watery poop (diarrhea).    These pouches are called diverticula. The pouches form in people who have a condition called diverticulosis.      What are the causes?    This condition may be caused by poop (stool) that gets trapped in the pouches in your colon. The poop lets germs (bacteria) grow in the pouches. This causes the infection.      What increases the risk?    You are more likely to get this condition if you have small pouches in your colon. The risk is higher if:  •You are overweight or very overweight (obese).      •You do not exercise enough.      •You drink alcohol.      •You smoke or use products with tobacco in them.      •You eat a diet that has a lot of red meat such as beef, pork, or lamb.      •You eat a diet that does not have enough fiber in it.      •You are older than 40 years of age.        What are the signs or symptoms?    •Pain in the belly. Pain is often on the left side, but it may be in other areas.      •Fever and feeling cold.      •Feeling like you may vomit.      •Vomiting.      •Having cramps.      •Feeling full.      •Changes to how often you poop.      •Blood in your poop.        How is this treated?    Most cases are treated at home by:  •Taking over-the-counter pain medicines.      •Following a clear liquid diet.      •Taking antibiotic medicines.      •Resting.      Very bad cases may need to be treated at a hospital. This may include:  •Not eating or drinking.      •Taking prescription pain medicine.      •Getting antibiotic medicines through an IV tube.      •Getting fluid and food through an IV tube.      •Having surgery.      When you are feeling better, your doctor may tell you to have a test to check your colon (colonoscopy).      Follow these instructions at home:    Medicines   •Take over-the-counter and prescription medicines only as told by your doctor. These include:  •Antibiotics.      •Pain medicines.      •Fiber pills.      •Probiotics.      •Stool softeners.        •If you were prescribed an antibiotic medicine, take it as told by your doctor. Do not stop taking the antibiotic even if you start to feel better.      •Ask your doctor if the medicine prescribed to you requires you to avoid driving or using machinery.        Eating and drinking      •Follow a diet as told by your doctor.    •When you feel better, your doctor may tell you to change your diet. You may need to eat a lot of fiber. Fiber makes it easier to poop (have a bowel movement). Foods with fiber include:  •Berries.      •Beans.      •Lentils.      •Green vegetables.        •Avoid eating red meat.      General instructions     • Do not use any products that contain nicotine or tobacco, such as cigarettes, e-cigarettes, and chewing tobacco. If you need help quitting, ask your doctor.      •Exercise 3 or more times a week. Try to get 30 minutes each time. Exercise enough to sweat and make your heart beat faster.      •Keep all follow-up visits as told by your doctor. This is important.        Contact a doctor if:    •Your pain does not get better.      •You are not pooping like normal.        Get help right away if:    •Your pain gets worse.      •Your symptoms do not get better.      •Your symptoms get worse very fast.      •You have a fever.      •You vomit more than one time.    •You have poop that is:  •Bloody.      •Black.      •Tarry.          Summary    •This condition happens when small pouches in your colon get infected or swollen.      •Take medicines only as told by your doctor.      •Follow a diet as told by your doctor.      •Keep all follow-up visits as told by your doctor. This is important.      This information is not intended to replace advice given to you by your health care provider. Make sure you discuss any questions you have with your health care provider.

## 2022-06-26 NOTE — ED PROVIDER NOTE - OBJECTIVE STATEMENT
63 y/o F w/ PMHx of HTN, hyperlipidemia, diabetes, prior episode of diverticulitis and PSHx of myomectomy, ovarian cystectomy presents to the ED c/o x2 days of diffuse abdominal pain and diarrhea. As per patient, she had colonoscopy performed x6 days prior and had four polyps removed. Notes small amount of blood with stool. Denies clotting, active bleeding, fever and vomiting.

## 2022-06-26 NOTE — ED POST DISCHARGE NOTE - ADDITIONAL DOCUMENTATION
EVETTE Masterson: pt called rx for Augmentin was sent to the wrong pharmacy, rx resent to requesting pharmacy.

## 2022-06-26 NOTE — ED ADULT TRIAGE NOTE - BP NONINVASIVE DIASTOLIC (MM HG)
Pt asked for bed pan, 95

## 2022-06-26 NOTE — ED ADULT NURSE NOTE - NS ED NOTE ABUSE RESPONSE YN
Attempted to contact patient to reschedule appt. Unable to reach patient directly. LVM to return call.     *Provider will be in surgery all day.    Yes

## 2022-06-26 NOTE — ED ADULT NURSE NOTE - OBJECTIVE STATEMENT
Patient is a 63 yo female, h/x diverticulitis, DM2, HTN, asthma, presenting with abdominal pain and diarrhea x 4 days. AAOx4, no signs of distress, abdomen soft, tender, nondistended. Patient denies nausea, vomiting, fever, chills, urinary symptoms, chest pain, shortness of breath. 20G PIV placed to LAC, labs sent per orders. Pending CT. Medicated for pain. Fall precautions maintained. Patient is a 63 yo female, h/x diverticulitis, DM2, HTN, HLD, asthma, presenting with abdominal pain and diarrhea x 4 days. AAOx4, no signs of distress, abdomen soft, tender, nondistended. Patient denies nausea, vomiting, fever, chills, urinary symptoms, chest pain, shortness of breath. 20G PIV placed to LAC, labs sent per orders. Pending CT. Medicated for pain. Fall precautions maintained.

## 2022-06-26 NOTE — ED PROVIDER NOTE - PATIENT PORTAL LINK FT
You can access the FollowMyHealth Patient Portal offered by [facility] by registering at the following website: [email or web address]. By joining [employer]’s FollowMyHealth portal, you will also be able to view your health information using other applications (apps) compatible with our system.

## 2022-06-26 NOTE — ED PROVIDER NOTE - CLINICAL SUMMARY MEDICAL DECISION MAKING FREE TEXT BOX
63 y/o F w/ PMHx of HTN, hyperlipidemia, diabetes, prior episode of diverticulitis and PSHx of myomectomy, ovarian cystectomy presents to the ED c/o x2 days of diffuse abdominal pain and diarrhea. Differential includes diverticulitis vs perforation vs polypectomy bleed. Will check H&H, CT abdomen pelvis and reassess.

## 2022-06-27 ENCOUNTER — NON-APPOINTMENT (OUTPATIENT)
Age: 62
End: 2022-06-27

## 2022-06-27 LAB — SARS-COV-2 N GENE NPH QL NAA+PROBE: NOT DETECTED

## 2022-06-29 ENCOUNTER — APPOINTMENT (OUTPATIENT)
Dept: ORTHOPEDIC SURGERY | Facility: CLINIC | Age: 62
End: 2022-06-29
Payer: COMMERCIAL

## 2022-06-29 VITALS — HEIGHT: 64 IN | BODY MASS INDEX: 45.41 KG/M2 | WEIGHT: 266 LBS

## 2022-06-29 PROBLEM — K57.32 SIGMOID DIVERTICULITIS: Status: ACTIVE | Noted: 2022-02-24

## 2022-06-29 PROCEDURE — 99214 OFFICE O/P EST MOD 30 MIN: CPT | Mod: 25

## 2022-06-29 PROCEDURE — S0020: CPT

## 2022-06-29 PROCEDURE — 76881 US COMPL JOINT R-T W/IMG: CPT | Mod: LT,59

## 2022-06-29 PROCEDURE — 20611 DRAIN/INJ JOINT/BURSA W/US: CPT

## 2022-06-29 PROCEDURE — 73564 X-RAY EXAM KNEE 4 OR MORE: CPT | Mod: 1L,LT

## 2022-06-29 RX ORDER — CIPROFLOXACIN HYDROCHLORIDE 500 MG/1
500 TABLET, FILM COATED ORAL
Qty: 14 | Refills: 0 | Status: COMPLETED | COMMUNITY
Start: 2022-06-29 | End: 2022-07-06

## 2022-06-29 RX ORDER — METRONIDAZOLE 500 MG/1
500 TABLET ORAL
Qty: 21 | Refills: 0 | Status: COMPLETED | COMMUNITY
Start: 2022-06-29 | End: 2022-07-06

## 2022-06-29 NOTE — HISTORY OF PRESENT ILLNESS
[10] : 10 [9] : 9 [Throbbing] : throbbing [Constant] : constant [Meds] : meds [Ice] : ice [Massage] : massage [de-identified] : 62 year old female with pain in the left knee, symptoms started a week ago and intensified this am, she could hardly walk this morning upon waking. She is walking with a cane. PAin with walking, stairs, trouble getting up from a seated position. No mechanical symptoms. She has been using topical ointments and ice, with help.  She is using cane [] : no [FreeTextEntry1] : left knee pain [FreeTextEntry5] : pt states she started to have some pain in the knee last week but when she woke up this morning she could barely bend it. she is using a cane to help walk  [FreeTextEntry9] : tylenol  [de-identified] : movement

## 2022-06-29 NOTE — PROCEDURE
[Large Joint Injection] : Large joint injection [Left] : of the left [Knee] : knee [Betadine] : betadine [] : Patient tolerated procedure well [Apply ice for 15min out of every hour for the next 12-24 hours as tolerated] : apply ice for 15 minutes out of every hour for the next 12-24 hours as tolerated [Patient was advised to rest the joint(s) for ____ days] : patient was advised to rest the joint(s) for [unfilled] days [Risks, benefits, alternatives discussed / Verbal consent obtained] : the risks benefits, and alternatives have been discussed, and verbal consent was obtained [All ultrasound images have been permanently captured and stored accordingly in our picture archiving and communication system] : All ultrasound images have been permanently captured and stored accordingly in our picture archiving and communication system [Pain] : pain [Inflammation] : inflammation

## 2022-06-29 NOTE — DISCUSSION/SUMMARY
[de-identified] : Patient allowed to gently start resuming activities.\par Discussed change to medication prescription and usage. \par Offered cortisone steroid injection. \par Bracing options discussed with patient. \par Hyaluronic Acid inj pamphlet given to pt.

## 2022-06-29 NOTE — PHYSICAL EXAM
[5___] : hamstring 5[unfilled]/5 [Left] : left knee [All Views] : anteroposterior, lateral, skyline, and anteroposterior standing [Degenerative change] : Degenerative change [] : patient ambulates with assistive device [[id number]] : flexion 115 degrees [de-identified] : extension 0 degrees

## 2022-07-09 ENCOUNTER — EMERGENCY (EMERGENCY)
Facility: HOSPITAL | Age: 62
LOS: 1 days | Discharge: HOME | End: 2022-07-09
Attending: EMERGENCY MEDICINE | Admitting: EMERGENCY MEDICINE

## 2022-07-09 VITALS
TEMPERATURE: 98 F | SYSTOLIC BLOOD PRESSURE: 131 MMHG | OXYGEN SATURATION: 100 % | RESPIRATION RATE: 20 BRPM | HEIGHT: 64 IN | DIASTOLIC BLOOD PRESSURE: 65 MMHG | HEART RATE: 92 BPM

## 2022-07-09 DIAGNOSIS — D25.9 LEIOMYOMA OF UTERUS, UNSPECIFIED: Chronic | ICD-10-CM

## 2022-07-09 DIAGNOSIS — N83.209 UNSPECIFIED OVARIAN CYST, UNSPECIFIED SIDE: Chronic | ICD-10-CM

## 2022-07-09 LAB
ALBUMIN SERPL ELPH-MCNC: 4.3 G/DL — SIGNIFICANT CHANGE UP (ref 3.3–5)
ALP SERPL-CCNC: 111 U/L — SIGNIFICANT CHANGE UP (ref 40–120)
ALT FLD-CCNC: 21 U/L — SIGNIFICANT CHANGE UP (ref 4–33)
ANION GAP SERPL CALC-SCNC: 13 MMOL/L — SIGNIFICANT CHANGE UP (ref 7–14)
APTT BLD: 28 SEC — SIGNIFICANT CHANGE UP (ref 27–36.3)
AST SERPL-CCNC: 17 U/L — SIGNIFICANT CHANGE UP (ref 4–32)
BASOPHILS # BLD AUTO: 0.01 K/UL — SIGNIFICANT CHANGE UP (ref 0–0.2)
BASOPHILS NFR BLD AUTO: 0.1 % — SIGNIFICANT CHANGE UP (ref 0–2)
BILIRUB SERPL-MCNC: 0.4 MG/DL — SIGNIFICANT CHANGE UP (ref 0.2–1.2)
BUN SERPL-MCNC: 19 MG/DL — SIGNIFICANT CHANGE UP (ref 7–23)
CALCIUM SERPL-MCNC: 9.7 MG/DL — SIGNIFICANT CHANGE UP (ref 8.4–10.5)
CHLORIDE SERPL-SCNC: 103 MMOL/L — SIGNIFICANT CHANGE UP (ref 98–107)
CO2 SERPL-SCNC: 22 MMOL/L — SIGNIFICANT CHANGE UP (ref 22–31)
CREAT SERPL-MCNC: 0.83 MG/DL — SIGNIFICANT CHANGE UP (ref 0.5–1.3)
CRP SERPL-MCNC: 11.5 MG/L — HIGH
EGFR: 80 ML/MIN/1.73M2 — SIGNIFICANT CHANGE UP
EOSINOPHIL # BLD AUTO: 0.02 K/UL — SIGNIFICANT CHANGE UP (ref 0–0.5)
EOSINOPHIL NFR BLD AUTO: 0.2 % — SIGNIFICANT CHANGE UP (ref 0–6)
ERYTHROCYTE [SEDIMENTATION RATE] IN BLOOD: 67 MM/HR — HIGH (ref 4–25)
GLUCOSE SERPL-MCNC: 252 MG/DL — HIGH (ref 70–99)
HCT VFR BLD CALC: 37.6 % — SIGNIFICANT CHANGE UP (ref 34.5–45)
HGB BLD-MCNC: 11.4 G/DL — LOW (ref 11.5–15.5)
IANC: 7.33 K/UL — SIGNIFICANT CHANGE UP (ref 1.8–7.4)
IMM GRANULOCYTES NFR BLD AUTO: 0.5 % — SIGNIFICANT CHANGE UP (ref 0–1.5)
INR BLD: 1.03 RATIO — SIGNIFICANT CHANGE UP (ref 0.88–1.16)
LYMPHOCYTES # BLD AUTO: 0.98 K/UL — LOW (ref 1–3.3)
LYMPHOCYTES # BLD AUTO: 11.5 % — LOW (ref 13–44)
MAGNESIUM SERPL-MCNC: 1.9 MG/DL — SIGNIFICANT CHANGE UP (ref 1.6–2.6)
MCHC RBC-ENTMCNC: 28 PG — SIGNIFICANT CHANGE UP (ref 27–34)
MCHC RBC-ENTMCNC: 30.3 GM/DL — LOW (ref 32–36)
MCV RBC AUTO: 92.4 FL — SIGNIFICANT CHANGE UP (ref 80–100)
MONOCYTES # BLD AUTO: 0.13 K/UL — SIGNIFICANT CHANGE UP (ref 0–0.9)
MONOCYTES NFR BLD AUTO: 1.5 % — LOW (ref 2–14)
NEUTROPHILS # BLD AUTO: 7.33 K/UL — SIGNIFICANT CHANGE UP (ref 1.8–7.4)
NEUTROPHILS NFR BLD AUTO: 86.2 % — HIGH (ref 43–77)
NRBC # BLD: 0 /100 WBCS — SIGNIFICANT CHANGE UP
NRBC # FLD: 0 K/UL — SIGNIFICANT CHANGE UP
PLATELET # BLD AUTO: 258 K/UL — SIGNIFICANT CHANGE UP (ref 150–400)
POTASSIUM SERPL-MCNC: 5 MMOL/L — SIGNIFICANT CHANGE UP (ref 3.5–5.3)
POTASSIUM SERPL-SCNC: 5 MMOL/L — SIGNIFICANT CHANGE UP (ref 3.5–5.3)
PROT SERPL-MCNC: 7.7 G/DL — SIGNIFICANT CHANGE UP (ref 6–8.3)
PROTHROM AB SERPL-ACNC: 11.9 SEC — SIGNIFICANT CHANGE UP (ref 10.5–13.4)
RBC # BLD: 4.07 M/UL — SIGNIFICANT CHANGE UP (ref 3.8–5.2)
RBC # FLD: 15.5 % — HIGH (ref 10.3–14.5)
SODIUM SERPL-SCNC: 138 MMOL/L — SIGNIFICANT CHANGE UP (ref 135–145)
WBC # BLD: 8.51 K/UL — SIGNIFICANT CHANGE UP (ref 3.8–10.5)
WBC # FLD AUTO: 8.51 K/UL — SIGNIFICANT CHANGE UP (ref 3.8–10.5)

## 2022-07-09 PROCEDURE — 73630 X-RAY EXAM OF FOOT: CPT | Mod: 26,LT

## 2022-07-09 PROCEDURE — 99285 EMERGENCY DEPT VISIT HI MDM: CPT

## 2022-07-09 PROCEDURE — 73610 X-RAY EXAM OF ANKLE: CPT | Mod: 26,LT

## 2022-07-09 PROCEDURE — 76882 US LMTD JT/FCL EVL NVASC XTR: CPT | Mod: 26,LT

## 2022-07-09 RX ORDER — ACETAMINOPHEN 500 MG
975 TABLET ORAL ONCE
Refills: 0 | Status: COMPLETED | OUTPATIENT
Start: 2022-07-09 | End: 2022-07-09

## 2022-07-09 RX ORDER — OXYCODONE HYDROCHLORIDE 5 MG/1
5 TABLET ORAL ONCE
Refills: 0 | Status: DISCONTINUED | OUTPATIENT
Start: 2022-07-09 | End: 2022-07-09

## 2022-07-09 RX ORDER — OXYCODONE HYDROCHLORIDE 5 MG/1
1 TABLET ORAL
Qty: 6 | Refills: 0
Start: 2022-07-09 | End: 2022-07-10

## 2022-07-09 RX ORDER — KETOROLAC TROMETHAMINE 30 MG/ML
15 SYRINGE (ML) INJECTION ONCE
Refills: 0 | Status: DISCONTINUED | OUTPATIENT
Start: 2022-07-09 | End: 2022-07-09

## 2022-07-09 RX ADMIN — OXYCODONE HYDROCHLORIDE 5 MILLIGRAM(S): 5 TABLET ORAL at 06:00

## 2022-07-09 RX ADMIN — Medication 975 MILLIGRAM(S): at 05:00

## 2022-07-09 RX ADMIN — Medication 15 MILLIGRAM(S): at 05:01

## 2022-07-09 NOTE — ED PROVIDER NOTE - CLINICAL SUMMARY MEDICAL DECISION MAKING FREE TEXT BOX
Riley PGY2: 63yo F with HTN HLD DM2 presents with L ankle-foot pain/swelling. TTP on L ankle b/l malleoli, no deformity, some tenderness across fore foot, some possible loss of arch. No redness, no fever. Check labs, ESR, CRP, xray area, pain control. Consider gout vs other crystallopathy in jt. Less likely septic w/o risks and w/o fever. Consider other AI causes given hx of similar sxs in R that respnded to steroids 2 = A lot of assistance

## 2022-07-09 NOTE — ED ADULT NURSE NOTE - OBJECTIVE STATEMENT
Patient received in room 29, A/Ox4, ambulatory, c/o foot pain. Patient states she has had worsening left ankle/foot pain x few days. Endorses swelling to area and inability to put pressure on it. Denies trauma, fever, chills, chest pain and SOB. Left ankle appears swollen and warm, tender to touch. 20G IV placed to left AC. Bed in lowest position, call bell within reach.

## 2022-07-09 NOTE — ED PROVIDER NOTE - PROGRESS NOTE DETAILS
Riley PGY2: xray neg. labs unrevealing, CRP elevated but no leukocytosis, no fever. No fluid collection on POCUS. Likely inflammatory vs gout. Will suggest NSAID/tylenol, ACE wrap and ortho f/u. Riley PGY2: ace placed , instructions given

## 2022-07-09 NOTE — ED PROVIDER NOTE - PHYSICAL EXAMINATION
CONSTITUTIONAL: mild-mod uncomfortable appearing  SKIN: Warm dry  HEAD: NCAT  EYES: NL inspection  ENT: MMM  NECK: Supple; non tender.  CARD: RRR  RESP: CTAB  ABD: S/NT no R/G  EXT: +L ankle b/l malleoli swelling & warmth lat>med, TTP over jt w/o deformity, some tenderness over forefoot with flattening slightly of arch, intact sensation and 2+ dorsalis pedis pulse. No erythema or streaking.  +R ankle lateral malleoli swelling as well but less than L (per pt is at baseline)  NEURO: Grossly unremarkable  PSYCH: Cooperative, appropriate.

## 2022-07-09 NOTE — ED ADULT TRIAGE NOTE - CHIEF COMPLAINT QUOTE
Patient c/o L foot pain and swelling. Denies any trauma to area. Pt. recently treated for R foot pain/swelling, told it was arthritis, states pain feels similar. Pt. states "I had an extra steroid pack so I've been taking that for pain." PMH asthma, HLD.

## 2022-07-09 NOTE — ED PROVIDER NOTE - NSFOLLOWUPINSTRUCTIONS_ED_ALL_ED_FT
Atypical Pain and Swelling of Joints - Left Ankle    Xray did not reveal any fracture   We recommend:     You can take Tylenol 1000mg every 6-8 hours  You can take Motrin (ibuprofun) 600mg every 6-8 hours.  Both can be taken together as needed for pain control.     If your pain remains uncontrolled and severe we recommend taking oxycodone 5mg for this pain  Can keep a compression bandage over ankle to help with swelling & pain    Follow up with your primary care provider.   Follow up with your Orthopedic provider for further eval of your joints.    Given your history of multiple joints being swollen and stiff hands, we also recommend following up with a RHEUMATOLOGIST for further systemic eval.    Rest, drink plenty of fluids.  Advance activity as tolerated.  Continue all previously prescribed medications as directed.  Follow up with your primary care physician in 48-72 hours- bring copies of your results.  Return to the ER for worsening or persistent symptoms, and/or ANY NEW OR CONCERNING SYMPTOMS. If you have issues obtaining follow up, please call: [phone] to obtain a doctor or specialist who takes your insurance in your area.

## 2022-07-09 NOTE — ED PROVIDER NOTE - PATIENT PORTAL LINK FT
You can access the FollowMyHealth Patient Portal offered by [facility] by registering at the following website: [email or web address]. By joining [employer]’s FollowMyHealth portal, you will also be able to view your health information using other applications (apps) compatible with our system.

## 2022-07-09 NOTE — ED PROVIDER NOTE - NS ED ROS FT
Constitutional:  See HPI  Cardiac:  No chest pain  Respiratory:  No cough or respiratory distress.   GI:  No nausea, vomiting, diarrhea or abdominal pain.  MS:  +L ankle/foot pain (see hpi)  Neuro:  No headache , no numbness/tingling  Skin:  No skin rashs/complaints  Except as documented in the HPI,  all other systems are negative

## 2022-07-09 NOTE — ED PROVIDER NOTE - NSFOLLOWUPCLINICS_GEN_ALL_ED_FT
[facility]  Rheumatology  [address]  [place] [address]  Phone: [phone]  Fax:     [facility]  Sports Medicine  [address] [place] [address]  Phone: [phone]  Fax:     [facility]  Rheumatology  [address] [place] [address]  Phone: [phone]  Fax: [phone]

## 2022-07-09 NOTE — ED PROVIDER NOTE - OBJECTIVE STATEMENT
63yo F with PMH of HTN HLD DM2 on trulicity presents to ED for eval of L ankle/foot pain 3d. Started with mild achy pain and swelling in L ankle that has progressively worsened to pt now she cannot bear it and can't really bear wt. No fever, CP, SOB, abd pain, NVDC. Has had a similar swelling episode in R ankle that was tx with steroids (?) and gout in L 1st toe but never in L ankle. No other IVDA. No trauma, no hard steps down, no slips or twists.

## 2022-07-09 NOTE — ED PROVIDER NOTE - ATTENDING CONTRIBUTION TO CARE
62F DM divertic p/w L ankle and foot sewllign and pain, no injury, x 3 days, progressing to severe today, difficulty walking on it.  Similar to previous event in R foot tx with steroids.  Mild swelling and tenderness.  No heat to ankle.  Sensation intact.  Given recurrent atraumatic joint pain possibly rheumatologic?  Not apparently infected as not hot, able to bear weight.  Skin overlying wnl.  No joint effusion to TT joint on US.  Possibly some form of inflammatory arthritis, pt has h/o gout.  Pt has seen ortho already w/o impv.  Rheum follow up after xrays and labs.  ACE wrap, pt already has cane, pain control.  VS:  unremarkable    GEN - Mild distress R ankle pain;   A+O x3   HEAD - NC/AT     ENT - PEERL, EOMI, mucous membranes    moist , no discharge      NECK: Neck supple, non-tender without lymphadenopathy, no masses, no JVD  PULM - CTA b/l,  symmetric breath sounds  COR -  normal heart sounds    ABD - , ND, NT, soft,  BACK - no CVA tenderness, nontender spine     EXTREMS - no edema, no deformity, warm and well perfused  R ankle mild swelling, no warmth or redness, mild diffuse ttp w/o bony deformity.  Distal NVT intact.    SKIN - no rash    or bruising      NEUROLOGIC - alert, face symmetric, speech fluent, sensation nl, motor no focal deficit.

## 2022-08-08 NOTE — H&P PST ADULT - NEGATIVE SKIN SYMPTOMS
Lane Cuba is a 70 year old male  Denies known Latex allergy or symptoms of Latex sensitivity.  Allergies reviewed.   Medications verified and changes made with Patient's assistance.  Prescription benefits verified.  Preferred Pharmacy verified.  Weight taken with shoes ON    Advanced Directives on file ? No      Tobacco History reviewed & updated with Pt.  Pt wearing mask: Yes   PPE worn during encounter -Surgical mask, Face shield.     Patient 's reason for appointment is: 1 week follow up abdominal bloating    Health Maintenance Due   Topic Date Due   • Hepatitis B Vaccine (1 of 3 - 3-dose series) Never done   • DM/CKD Microalbumin  Never done   • Shingles Vaccine (1 of 2) Never done   • COVID-19 Vaccine (3 - Booster for Moderna series) 08/09/2021   • Medicare Advantage- Medicare Wellness Visit  01/01/2022       Patient is due for the topics as listed above and wishes to discuss with MD.  Patient would like communication of their results via:      Cell Phone:   Telephone Information:   Mobile [phone]     Okay to leave a message containing results? Yes         no rash/no itching

## 2022-08-10 ENCOUNTER — APPOINTMENT (OUTPATIENT)
Dept: ORTHOPEDIC SURGERY | Facility: CLINIC | Age: 62
End: 2022-08-10

## 2022-08-10 VITALS — BODY MASS INDEX: 45.41 KG/M2 | HEIGHT: 64 IN | WEIGHT: 266 LBS

## 2022-08-10 DIAGNOSIS — M75.52 BURSITIS OF LEFT SHOULDER: ICD-10-CM

## 2022-08-10 PROCEDURE — 99214 OFFICE O/P EST MOD 30 MIN: CPT | Mod: 25

## 2022-08-10 PROCEDURE — J3490M: CUSTOM

## 2022-08-10 PROCEDURE — 20610 DRAIN/INJ JOINT/BURSA W/O US: CPT

## 2022-08-10 NOTE — ASSESSMENT
[FreeTextEntry1] : Bilateral shoulder pain, h/o R RCT.\par Two injections done at urgent care, last 5/4/22 with mild relief.\par Recommend new MRIs to evaluate RCTs.\par administer csi left shoudler today \par RTO for review

## 2022-08-10 NOTE — HISTORY OF PRESENT ILLNESS
[Dull/Aching] : dull/aching [Localized] : localized [Full time] : Work status: full time [5] : 5 [2] : 2 [Sharp] : sharp [Intermittent] : intermittent [Rest] : rest [de-identified] : 8-10-22- She states left shoulder pain and limiting rom waking her at night again. requesting csi and new mri rx\par \par 6/1/22: 60 yo RHD female with bilateral shoulder pain. Left is worse than right. She was seen at [facility] and was given CSI in March and on 5/17/22. She tried PT but it worsened symptoms. She takes diclofenac. \par \par 7/17/19: Here for follow up. She was in PT and she did improve. She stopped because she is saving her visits. She had\par the cortisone and she did get some relief.\par \par 5/22/19: She is here for follow up, she feels slightly improved. She is in PT with improvement.\par 4/10/19: 59 y/o RHD female with right shoulder pain since 2016. She denies specific injury. She state pain increased this\par past year. The worst pain is with reaching and raising her arm overhead. She is in PT with minimal relief. There is\par discomfort at night. She last had a cortisone injection in Feb 2019 with no relief. She takes Aleve twice a day. She saw\par Dr. Babb and is here for surgical consult.\par \par PMHx: HBP, DMII a1c 8\par \par MRI R shoulder: Full-thickness rotator cuff tear as detailed above. Joint and bursal effusions. Cuff muscle atrophy as\par noted. Chronic posterior labrum tear, with a Pryor lesion. Moderate AC joint osteoarthritis, with an accompanying\par pararticular ossification. [] : Post Surgical Visit: no [FreeTextEntry1] : left shoulder [FreeTextEntry5] : pt feels pain in bill shoulders.  pt has limited range of motion in the left shoulder. pt feels discomfort in bill shoulders.   [FreeTextEntry9] : tylenol  [de-identified] : motion

## 2022-08-10 NOTE — PROCEDURE
[Large Joint Injection] : Large joint injection [Left] : of the left [Subacromial Space] : subacromial space [Pain] : pain [Inflammation] : inflammation [Alcohol] : alcohol [Betadine] : betadine [Ethyl Chloride sprayed topically] : ethyl chloride sprayed topically [___ cc    6mg] :  Betamethasone (Celestone) ~Vcc of 6mg [___ cc    1%] : Lidocaine ~Vcc of 1%  [] : Patient tolerated procedure well [Call if redness, pain or fever occur] : call if redness, pain or fever occur [Apply ice for 15min out of every hour for the next 12-24 hours as tolerated] : apply ice for 15 minutes out of every hour for the next 12-24 hours as tolerated [Patient was advised to rest the joint(s) for ____ days] : patient was advised to rest the joint(s) for [unfilled] days [Previous OTC use and PT nontherapeutic] : patient has tried OTC's including aspirin, Ibuprofen, Aleve, etc or prescription NSAIDS, and/or exercises at home and/or physical therapy without satisfactory response [Patient had decreased mobility in the joint] : patient had decreased mobility in the joint [Risks, benefits, alternatives discussed / Verbal consent obtained] : the risks benefits, and alternatives have been discussed, and verbal consent was obtained

## 2022-08-10 NOTE — PHYSICAL EXAM
[5 ___] : forward flexion 5[unfilled]/5 [5___] : internal rotation 5[unfilled]/5 [] : motor and sensory intact distally [Bilateral] : shoulder bilaterally [There are no fractures, subluxations or dislocations. No significant abnormalities are seen] : There are no fractures, subluxations or dislocations. No significant abnormalities are seen [FreeTextEntry9] :  b/l\par ER R 20 L 30 [de-identified] : ER L 4 R 5

## 2022-08-14 ENCOUNTER — FORM ENCOUNTER (OUTPATIENT)
Age: 62
End: 2022-08-14

## 2022-08-15 ENCOUNTER — APPOINTMENT (OUTPATIENT)
Dept: ORTHOPEDIC SURGERY | Facility: CLINIC | Age: 62
End: 2022-08-15

## 2022-08-15 ENCOUNTER — APPOINTMENT (OUTPATIENT)
Dept: MRI IMAGING | Facility: CLINIC | Age: 62
End: 2022-08-15

## 2022-08-15 PROCEDURE — 73221 MRI JOINT UPR EXTREM W/O DYE: CPT | Mod: LT

## 2022-08-17 ENCOUNTER — APPOINTMENT (OUTPATIENT)
Dept: ORTHOPEDIC SURGERY | Facility: CLINIC | Age: 62
End: 2022-08-17

## 2022-08-17 VITALS — WEIGHT: 266 LBS | HEIGHT: 64 IN | BODY MASS INDEX: 45.41 KG/M2

## 2022-08-17 DIAGNOSIS — M75.121 COMPLETE ROTATOR CUFF TEAR OR RUPTURE OF RIGHT SHOULDER, NOT SPECIFIED AS TRAUMATIC: ICD-10-CM

## 2022-08-17 DIAGNOSIS — M19.011 PRIMARY OSTEOARTHRITIS, RIGHT SHOULDER: ICD-10-CM

## 2022-08-17 DIAGNOSIS — M75.122 COMPLETE ROTATOR CUFF TEAR OR RUPTURE OF LEFT SHOULDER, NOT SPECIFIED AS TRAUMATIC: ICD-10-CM

## 2022-08-17 PROCEDURE — 99214 OFFICE O/P EST MOD 30 MIN: CPT

## 2022-08-17 RX ORDER — IBUPROFEN 800 MG/1
800 TABLET, FILM COATED ORAL TWICE DAILY
Qty: 60 | Refills: 3 | Status: ACTIVE | COMMUNITY
Start: 2022-08-17 | End: 1900-01-01

## 2022-08-17 NOTE — DATA REVIEWED
[MRI] : MRI [Left] : left [Shoulder] : shoulder [I independently reviewed and interpreted images and report] : I independently reviewed and interpreted images and report [FreeTextEntry1] : RCT, bursitis, labral tearing, adhesive capsulitis, AC arthrosis

## 2022-08-17 NOTE — ASSESSMENT
[FreeTextEntry1] : Bilateral shoulder pain, h/o R RCT.\par Two injections done at urgent care, last 5/4/22 with mild relief.\par MRI and xrays reviewed.\par Discussed op versus non op tx, including the r/b/a/c of both.\par Discussed rehab and recovery after SA, SAD, acromioplasty, synovectomy, GHD, RCR, DCE.\par Discussed possible biceps tenodesis versus tenotomy pending evaluation intra op. \par She understood the risks of conservative treatment with retraction, atrophy, and inaiblity to repair. \par RTO prn.

## 2022-08-17 NOTE — HISTORY OF PRESENT ILLNESS
[Dull/Aching] : dull/aching [Sharp] : sharp [Rest] : rest [5] : 5 [Constant] : constant [Household chores] : household chores [Work] : work [Sleep] : sleep [de-identified] : 8/17/22: Here for MRI review.\par \par MRI L shoulder:\par 1. AC joint arthrosis.\par 2. Infraspinatus tendinopathy and fraying.\par 3. Supraspinatus 10 x 11 mm full-thickness insertional tear with proximal tendinopathy and fraying extending to  the myotendinous junction with bursitis and no muscle atrophy.\par 4. Fraying and tear of the superior labrum and anterior inferior labrum. Biceps tendinopathy and interstitial tear  extending to the anchor. Tenosynovitis.\par 5. Capsular thickening which can be seen with adhesive capsulitis.\par 6. Glenohumeral joint arthrosis with joint effusion.\par \par 6/1/22: 62 yo RHD female with bilateral shoulder pain. Left is worse than right. She was seen at [facility] and was given CSI in March and on 5/17/22. She tried PT but it worsened symptoms. She takes diclofenac. \par \par 7/17/19: Here for follow up. She was in PT and she did improve. She stopped because she is saving her visits. She had\par the cortisone and she did get some relief.\par \par 5/22/19: She is here for follow up, she feels slightly improved. She is in PT with improvement.\par 4/10/19: 59 y/o RHD female with right shoulder pain since 2016. She denies specific injury. She state pain increased this\par past year. The worst pain is with reaching and raising her arm overhead. She is in PT with minimal relief. There is\par discomfort at night. She last had a cortisone injection in Feb 2019 with no relief. She takes Aleve twice a day. She saw\par Dr. Babb and is here for surgical consult.\par \par PMHx: HBP, DMII a1c 8\par \par MRI R shoulder: Full-thickness rotator cuff tear as detailed above. Joint and bursal effusions. Cuff muscle atrophy as\par noted. Chronic posterior labrum tear, with a Pryor lesion. Moderate AC joint osteoarthritis, with an accompanying\par pararticular ossification. [FreeTextEntry5] : pt feels pain in bill shoulders.  pt has limited range of motion in the left shoulder. pt feels discomfort in bill shoulders.   [FreeTextEntry9] : Ibuprofin [de-identified] : motion

## 2022-08-17 NOTE — PHYSICAL EXAM
[5 ___] : forward flexion 5[unfilled]/5 [5___] : internal rotation 5[unfilled]/5 [] : motor and sensory intact distally [Bilateral] : shoulder bilaterally [There are no fractures, subluxations or dislocations. No significant abnormalities are seen] : There are no fractures, subluxations or dislocations. No significant abnormalities are seen [FreeTextEntry9] :  b/l\par ER R 20 L 30 [de-identified] : ER L 4 R 5

## 2022-09-07 ENCOUNTER — APPOINTMENT (OUTPATIENT)
Dept: ORTHOPEDIC SURGERY | Facility: CLINIC | Age: 62
End: 2022-09-07

## 2022-09-07 VITALS — WEIGHT: 270 LBS | BODY MASS INDEX: 46.1 KG/M2 | HEIGHT: 64 IN

## 2022-09-07 PROCEDURE — 99213 OFFICE O/P EST LOW 20 MIN: CPT

## 2022-09-07 RX ORDER — MELOXICAM 15 MG/1
15 TABLET ORAL
Qty: 30 | Refills: 1 | Status: ACTIVE | COMMUNITY
Start: 2022-09-07 | End: 1900-01-01

## 2022-09-07 NOTE — PHYSICAL EXAM
[5___] : hamstring 5[unfilled]/5 [Left] : left knee [All Views] : anteroposterior, lateral, skyline, and anteroposterior standing [Degenerative change] : Degenerative change [] : patient ambulates with assistive device [[id number]] : flexion 115 degrees [de-identified] : extension 0 degrees

## 2022-09-07 NOTE — HISTORY OF PRESENT ILLNESS
[10] : 10 [9] : 9 [Throbbing] : throbbing [Constant] : constant [Meds] : meds [Ice] : ice [Massage] : massage [de-identified] : pain in the left knee, symptoms swithout trauma, CSI with some help, worse when active she could hardly walk this morning upon waking. She has trouble getting up from a seated position. No mechanical symptoms. She has been using topical ointments and ice, with help.  Diclofenac without help [] : no [FreeTextEntry1] : left knee pain [FreeTextEntry5] : pt states she started to have some pain in the knee last week but when she woke up this morning she could barely bend it. she is using a cane to help walk  [FreeTextEntry9] : tylenol  [de-identified] : movement

## 2022-09-07 NOTE — DISCUSSION/SUMMARY
[de-identified] : Patient allowed to gently start resuming activities.\par Discussed change to medication prescription and usage. \par Offered cortisone steroid injection. \par Bracing options discussed with patient. \par Hyaluronic Acid inj pamphlet given to pt. \par Name of Insurance\par Insurance Address:\par 09/07/2022 \par RE:  JACQUIE GRANDA \par \par Acct #- [id number] \par \par \par Attention:  Nurse Reviewer /Medical Director\par \par I am writing this letter as a medical necessity for HA orthovisc L knee\par Patient has tried analgesics, non-steroid anti-inflammatory agents, \par physical therapy, hot or cold compresses,injections of corticosteroids, etc)  which in combination or by themselves has not worked.\par Based on my patient's condition, I strongly believe that the Hyaluronic aid injections is medically needed.\par  \par Thank you for your time and consideration.   \par \par

## 2022-09-21 ENCOUNTER — APPOINTMENT (OUTPATIENT)
Dept: ORTHOPEDIC SURGERY | Facility: CLINIC | Age: 62
End: 2022-09-21

## 2022-09-21 VITALS — HEIGHT: 64 IN | BODY MASS INDEX: 46.1 KG/M2 | WEIGHT: 270 LBS

## 2022-09-21 PROCEDURE — 99213 OFFICE O/P EST LOW 20 MIN: CPT | Mod: 25

## 2022-09-21 PROCEDURE — 99212 OFFICE O/P EST SF 10 MIN: CPT | Mod: 25

## 2022-09-21 PROCEDURE — 20611 DRAIN/INJ JOINT/BURSA W/US: CPT

## 2022-09-21 NOTE — PROCEDURE
[FreeTextEntry3] : Orthovisc (Large Joint) with Ultrasound Guidance\par Viscosupplementation Injection: X-ray evidence of Osteoarthritis on this or prior visit and Patient has tried OTC's including aspirin, Ibuprofen, Aleve etc or prescription NSAIDS, and/or exercises at home and/ or physical therapy without satisfactory response. \par An injection of Orthovisc 2ml #1 was injected into the left knee(s). after verbal consent using sterile technique. The risks, benefits, and alternatives to Viscosupplementation injection were explained in full to the patient. Risks outlined include but are not limited to infection, sepsis, bleeding, scarring, skin discoloration, temporary increase in pain, syncopal episode, failure to resolve symptoms, allergic reaction, and symptom recurrence. Signs and symptoms of infection reviewed and patient advised to call immediately for redness, fevers, and/or chills. Patient understood the risks. All questions were answered. After discussion of options, patient requested Viscosupplementation. Oral informed consent was obtained and sterile prep was done of the injection site. Sterile technique was used without complications. The patient tolerated the procedure well. Ice tonight to the injection site. \par \par Ultrasound Guidance was used for the following reasons: prior failure or difficult injection. \par \par Ultrasound guided injection was performed of the knee, visualization of the needle and placement of injection was performed without complication. \par

## 2022-09-21 NOTE — PHYSICAL EXAM
[5___] : hamstring 5[unfilled]/5 [Left] : left knee [All Views] : anteroposterior, lateral, skyline, and anteroposterior standing [Degenerative change] : Degenerative change [] : patient ambulates with assistive device [[id number]] : flexion 115 degrees [de-identified] : extension 0 degrees

## 2022-09-28 ENCOUNTER — APPOINTMENT (OUTPATIENT)
Dept: ORTHOPEDIC SURGERY | Facility: CLINIC | Age: 62
End: 2022-09-28

## 2022-09-28 VITALS — WEIGHT: 270 LBS | BODY MASS INDEX: 46.1 KG/M2 | HEIGHT: 64 IN

## 2022-09-28 PROCEDURE — 20611 DRAIN/INJ JOINT/BURSA W/US: CPT | Mod: LT

## 2022-09-28 PROCEDURE — 99212 OFFICE O/P EST SF 10 MIN: CPT | Mod: 25

## 2022-09-28 NOTE — HISTORY OF PRESENT ILLNESS
[2] : 2 [Orthovisc] : Orthovisc [de-identified] : Patient returns for Orthovisc #2 left knee. Tolerated the first injection well, no adverse reactions.  [] : no [de-identified] : 09/21/2022 [de-identified] : left knee

## 2022-09-28 NOTE — PROCEDURE
[FreeTextEntry3] : Orthovisc (Large Joint) with Ultrasound Guidance\par Viscosupplementation Injection: X-ray evidence of Osteoarthritis on this or prior visit and Patient has tried OTC's including aspirin, Ibuprofen, Aleve etc or prescription NSAIDS, and/or exercises at home and/ or physical therapy without satisfactory response. \par An injection of Orthovisc 2ml #2 was injected into the left knee(s). after verbal consent using sterile technique. The risks, benefits, and alternatives to Viscosupplementation injection were explained in full to the patient. Risks outlined include but are not limited to infection, sepsis, bleeding, scarring, skin discoloration, temporary increase in pain, syncopal episode, failure to resolve symptoms, allergic reaction, and symptom recurrence. Signs and symptoms of infection reviewed and patient advised to call immediately for redness, fevers, and/or chills. Patient understood the risks. All questions were answered. After discussion of options, patient requested Viscosupplementation. Oral informed consent was obtained and sterile prep was done of the injection site. Sterile technique was used without complications. The patient tolerated the procedure well. Ice tonight to the injection site. \par \par Ultrasound Guidance was used for the following reasons: prior failure or difficult injection. \par \par Ultrasound guided injection was performed of the knee, visualization of the needle and placement of injection was performed without complication. \par

## 2022-09-28 NOTE — PHYSICAL EXAM
[5___] : hamstring 5[unfilled]/5 [Left] : left knee [All Views] : anteroposterior, lateral, skyline, and anteroposterior standing [Degenerative change] : Degenerative change [] : patient ambulates with assistive device [[id number]] : flexion 115 degrees [de-identified] : extension 0 degrees

## 2022-10-05 ENCOUNTER — APPOINTMENT (OUTPATIENT)
Dept: ORTHOPEDIC SURGERY | Facility: CLINIC | Age: 62
End: 2022-10-05

## 2022-10-05 VITALS — BODY MASS INDEX: 46.1 KG/M2 | HEIGHT: 64 IN | WEIGHT: 270 LBS

## 2022-10-05 PROCEDURE — 99212 OFFICE O/P EST SF 10 MIN: CPT | Mod: 25

## 2022-10-05 PROCEDURE — 20611 DRAIN/INJ JOINT/BURSA W/US: CPT

## 2022-10-05 NOTE — PHYSICAL EXAM
[5___] : hamstring 5[unfilled]/5 [Left] : left knee [All Views] : anteroposterior, lateral, skyline, and anteroposterior standing [Degenerative change] : Degenerative change [] : patient ambulates with assistive device [[id number]] : flexion 115 degrees [de-identified] : extension 0 degrees

## 2022-10-05 NOTE — HISTORY OF PRESENT ILLNESS
[3] : 3 [Orthovisc] : Orthovisc [de-identified] : Patient returns for Orthovisc #3 left knee. Tolerating the series well.  [] : no [de-identified] : 09/28/2022 [de-identified] : left knee

## 2022-10-05 NOTE — PROCEDURE
[FreeTextEntry3] : Orthovisc (Large Joint) with Ultrasound Guidance\par Viscosupplementation Injection: X-ray evidence of Osteoarthritis on this or prior visit and Patient has tried OTC's including aspirin, Ibuprofen, Aleve etc or prescription NSAIDS, and/or exercises at home and/ or physical therapy without satisfactory response. \par An injection of Orthovisc 2ml #3 was injected into the left knee(s). after verbal consent using sterile technique. The risks, benefits, and alternatives to Viscosupplementation injection were explained in full to the patient. Risks outlined include but are not limited to infection, sepsis, bleeding, scarring, skin discoloration, temporary increase in pain, syncopal episode, failure to resolve symptoms, allergic reaction, and symptom recurrence. Signs and symptoms of infection reviewed and patient advised to call immediately for redness, fevers, and/or chills. Patient understood the risks. All questions were answered. After discussion of options, patient requested Viscosupplementation. Oral informed consent was obtained and sterile prep was done of the injection site. Sterile technique was used without complications. The patient tolerated the procedure well. Ice tonight to the injection site. \par \par Ultrasound Guidance was used for the following reasons: prior failure or difficult injection. \par \par Ultrasound guided injection was performed of the knee, visualization of the needle and placement of injection was performed without complication. \par

## 2022-10-12 ENCOUNTER — APPOINTMENT (OUTPATIENT)
Dept: ORTHOPEDIC SURGERY | Facility: CLINIC | Age: 62
End: 2022-10-12

## 2022-10-12 VITALS — HEIGHT: 64 IN | BODY MASS INDEX: 46.1 KG/M2 | WEIGHT: 270 LBS

## 2022-10-12 PROCEDURE — 20611 DRAIN/INJ JOINT/BURSA W/US: CPT

## 2022-10-12 PROCEDURE — 99212 OFFICE O/P EST SF 10 MIN: CPT | Mod: 25

## 2022-10-12 NOTE — DISCUSSION/SUMMARY
[de-identified] : rest, ice, activity modification. \par \par Frequency of visco and csi discussed.\par REturn in 6 weeks ago. \par

## 2022-10-12 NOTE — PHYSICAL EXAM
[5___] : hamstring 5[unfilled]/5 [Left] : left knee [All Views] : anteroposterior, lateral, skyline, and anteroposterior standing [Degenerative change] : Degenerative change [] : patient ambulates with assistive device [[id number]] : flexion 115 degrees [de-identified] : extension 0 degrees

## 2022-10-12 NOTE — PROCEDURE
[FreeTextEntry3] : Orthovisc (Large Joint) with Ultrasound Guidance\par Viscosupplementation Injection: X-ray evidence of Osteoarthritis on this or prior visit and Patient has tried OTC's including aspirin, Ibuprofen, Aleve etc or prescription NSAIDS, and/or exercises at home and/ or physical therapy without satisfactory response. \par An injection of Orthovisc 2ml #4 was injected into the left knee(s). after verbal consent using sterile technique. The risks, benefits, and alternatives to Viscosupplementation injection were explained in full to the patient. Risks outlined include but are not limited to infection, sepsis, bleeding, scarring, skin discoloration, temporary increase in pain, syncopal episode, failure to resolve symptoms, allergic reaction, and symptom recurrence. Signs and symptoms of infection reviewed and patient advised to call immediately for redness, fevers, and/or chills. Patient understood the risks. All questions were answered. After discussion of options, patient requested Viscosupplementation. Oral informed consent was obtained and sterile prep was done of the injection site. Sterile technique was used without complications. The patient tolerated the procedure well. Ice tonight to the injection site. \par \par Ultrasound Guidance was used for the following reasons: prior failure or difficult injection. \par \par Ultrasound guided injection was performed of the knee, visualization of the needle and placement of injection was performed without complication. \par

## 2022-10-12 NOTE — HISTORY OF PRESENT ILLNESS
[4] : 4 [Orthovisc] : Orthovisc [de-identified] : Patient returns for Orthovisc #4 left knee. Tolerating the series well.  [] : no [de-identified] : 10/05/2022 [de-identified] : left knee

## 2022-10-24 ENCOUNTER — APPOINTMENT (OUTPATIENT)
Dept: ORTHOPEDIC SURGERY | Facility: CLINIC | Age: 62
End: 2022-10-24

## 2022-10-26 ENCOUNTER — APPOINTMENT (OUTPATIENT)
Dept: ORTHOPEDIC SURGERY | Facility: CLINIC | Age: 62
End: 2022-10-26

## 2022-10-26 VITALS — WEIGHT: 270 LBS | BODY MASS INDEX: 46.1 KG/M2 | HEIGHT: 64 IN

## 2022-10-26 PROCEDURE — J3490M: CUSTOM

## 2022-10-26 PROCEDURE — 99214 OFFICE O/P EST MOD 30 MIN: CPT | Mod: 25

## 2022-10-26 PROCEDURE — 20611 DRAIN/INJ JOINT/BURSA W/US: CPT

## 2022-10-26 NOTE — HISTORY OF PRESENT ILLNESS
[Left Leg] : left leg [9] : 9 [7] : 7 [Dull/Aching] : dull/aching [Localized] : localized [Throbbing] : throbbing [Intermittent] : intermittent [Leisure] : leisure [Nothing helps with pain getting better] : Nothing helps with pain getting better [de-identified] : Patient completed Orthovisc #4 left knee. Marc flare of pain last week and went to ER mon and had toradol injection which helped and tried motrin [4] : 4 [Orthovisc] : Orthovisc [] : Post Surgical Visit: no [FreeTextEntry1] : left knee [de-identified] : activity [de-identified] : 10/05/2022 [de-identified] : left knee

## 2022-10-26 NOTE — PHYSICAL EXAM
[Left] : left knee [5___] : hamstring 5[unfilled]/5 [] : ambulation with cane [TWNoteComboBox7] : flexion 115 degrees [de-identified] : extension 0 degrees

## 2022-10-26 NOTE — DISCUSSION/SUMMARY
[de-identified] : will try csi\par try ice\par and cont with motrin\par 10/26/2022 \par \par  RE:  JACQUIE GRANDA \par \par Acct #- [id number] \par \par \par Attention:  Nurse Reviewer /Medical Director\par \par I am writing this letter as a medical necessity for PT program.\par Patient has tried analgesics, non-steroid anti-inflammatory agents, \par hot or cold compresses,injections of corticosteroids, etc)  which in combination or by themselves has not worked.\par Based on my patient's condition, I strongly believe that the PT is medically needed.\par  \par Thank you for your time and consideration.   \par \par \par

## 2022-11-30 ENCOUNTER — APPOINTMENT (OUTPATIENT)
Dept: ORTHOPEDIC SURGERY | Facility: CLINIC | Age: 62
End: 2022-11-30

## 2022-12-07 ENCOUNTER — APPOINTMENT (OUTPATIENT)
Dept: ORTHOPEDIC SURGERY | Facility: CLINIC | Age: 62
End: 2022-12-07

## 2022-12-07 VITALS — WEIGHT: 270 LBS | BODY MASS INDEX: 46.1 KG/M2 | HEIGHT: 64 IN

## 2022-12-07 PROCEDURE — 99214 OFFICE O/P EST MOD 30 MIN: CPT

## 2022-12-07 RX ORDER — INDOMETHACIN 50 MG/1
50 CAPSULE ORAL
Qty: 90 | Refills: 0 | Status: ACTIVE | COMMUNITY
Start: 2022-12-07 | End: 1900-01-01

## 2022-12-07 NOTE — HISTORY OF PRESENT ILLNESS
[Left Leg] : left leg [10] : 10 [8] : 8 [Dull/Aching] : dull/aching [Localized] : localized [Throbbing] : throbbing [Intermittent] : intermittent [Leisure] : leisure [Meds] : meds [Ice] : ice [Injection therapy] : injection therapy [4] : 4 [Orthovisc] : Orthovisc [de-identified] : Here for follow up L knee. She had another flare up of pain over the past week. States she has been to the ER 3x since last week and received 3 Toradol injections. Has been doing ice, ibuprofen, Tylenol, pain patches, and elevation with little relief.  [] : Post Surgical Visit: no [FreeTextEntry1] : left knee [FreeTextEntry5] : Patient is here today for follow up on left knee. Patient states that the pain has gotten worse since last visit. CSI from last visit lasted until last week then another flare up started. Pt was only able to do physical therapy a few times since last visit [de-identified] : activity [de-identified] : 10/05/2022 [de-identified] : left knee

## 2022-12-07 NOTE — PHYSICAL EXAM
[Left] : left knee [5___] : hamstring 5[unfilled]/5 [] : patient ambulates with assistive device [TWNoteComboBox7] : flexion 115 degrees [de-identified] : extension 0 degrees

## 2022-12-07 NOTE — DISCUSSION/SUMMARY
[de-identified] : Patient allowed to gently start resuming activities. \par Discussed change to medication prescription and usage. \par Bracing options discussed with patient. \par Activity modification as needed\par Discussed poss future surgery, pt deciding\par she will likely need TKA as all other treamtments have not helped\par 12/07/2022 \par \par  RE:  JACQUIE GRANDA \par \par Acct #- [id number] \par \par \par Attention:  Nurse Reviewer /Medical Director\par \par I am writing this letter as a medical necessity for PT program.\par Patient has tried analgesics, non-steroid anti-inflammatory agents, \par hot or cold compresses,injections of corticosteroids, etc)  which in combination or by themselves has not worked.\par Based on my patient's condition, I strongly believe that the PT is medically needed.\par  \par Thank you for your time and consideration.   \par \par

## 2022-12-21 ENCOUNTER — APPOINTMENT (OUTPATIENT)
Dept: ORTHOPEDIC SURGERY | Facility: CLINIC | Age: 62
End: 2022-12-21

## 2022-12-21 VITALS — WEIGHT: 270 LBS | HEIGHT: 64 IN | BODY MASS INDEX: 46.1 KG/M2

## 2022-12-21 PROCEDURE — 99214 OFFICE O/P EST MOD 30 MIN: CPT

## 2022-12-21 NOTE — ASSESSMENT
[FreeTextEntry1] : 62F with mild L knee OA\par \par Will obtain MRI L knee to eval for MMT. She has tried multiple modalities of conservative tx that are no longer providing sufficient relief. Briefly discussed TKA vs. arthroscopy. Will discuss further tx options after MRI

## 2022-12-21 NOTE — DISCUSSION/SUMMARY
[de-identified] : The natural progression of Osteoarthritis was explained to the patient.  We discussed the possible treatment options from conservative to operative.  These included NSAIDS, Glucosamine and Chondrotin sulfate, and Physical Therapy as well different types of injections.  We also discussed that at some point they may progress to needed a TKA.  Information and pamphlets were given.\par \par Entered by Eliana Aguirre PA-C working under the supervision of Félix Sommers MD. \par Patient seen by Eliana Aguirre PA-C under the supervision of Félix Sommers MD.

## 2022-12-21 NOTE — PHYSICAL EXAM
[Left] : left knee [5___] : hamstring 5[unfilled]/5 [] : patient ambulates with assistive device [TWNoteComboBox7] : flexion 115 degrees [de-identified] : extension 0 degrees

## 2022-12-21 NOTE — HISTORY OF PRESENT ILLNESS
[Gradual] : gradual [6] : 6 [2] : 2 [Dull/Aching] : dull/aching [Nothing helps with pain getting better] : Nothing helps with pain getting better [de-identified] : 12/21/22: 63yo F with longstanding left knee pain for the past few years with no injury. She has been treated for this issue by Dr. Murcia with multiple CSIs, visco series, and anti-inflammatories that are no longer providing sufficient relief. PT somewhat beneficial. Admits to increasing pain and difficulty with prolonged walking and stairs. She has been to the ER multiple times due to pain and has received Toradol inj to temporary relief.  [] : no [FreeTextEntry1] : left knee [FreeTextEntry3] : a years  [FreeTextEntry5] : patient states she is having pain, no injury she is here today for a surgery consult  [de-identified] : activity  [de-identified] : 12/07/22 [de-identified] : Dr Murcia  [de-identified] : XR

## 2022-12-27 ENCOUNTER — FORM ENCOUNTER (OUTPATIENT)
Age: 62
End: 2022-12-27

## 2022-12-28 ENCOUNTER — APPOINTMENT (OUTPATIENT)
Dept: MRI IMAGING | Facility: CLINIC | Age: 62
End: 2022-12-28

## 2022-12-28 PROCEDURE — 73721 MRI JNT OF LWR EXTRE W/O DYE: CPT | Mod: LT

## 2023-01-16 ENCOUNTER — EMERGENCY (EMERGENCY)
Facility: HOSPITAL | Age: 63
LOS: 1 days | Discharge: HOME | End: 2023-01-16
Attending: EMERGENCY MEDICINE | Admitting: EMERGENCY MEDICINE
Payer: COMMERCIAL

## 2023-01-16 VITALS
HEART RATE: 80 BPM | OXYGEN SATURATION: 100 % | DIASTOLIC BLOOD PRESSURE: 75 MMHG | SYSTOLIC BLOOD PRESSURE: 134 MMHG | RESPIRATION RATE: 17 BRPM | TEMPERATURE: 98 F

## 2023-01-16 VITALS
HEART RATE: 100 BPM | SYSTOLIC BLOOD PRESSURE: 149 MMHG | RESPIRATION RATE: 17 BRPM | OXYGEN SATURATION: 100 % | TEMPERATURE: 98 F | DIASTOLIC BLOOD PRESSURE: 68 MMHG

## 2023-01-16 DIAGNOSIS — D25.9 LEIOMYOMA OF UTERUS, UNSPECIFIED: Chronic | ICD-10-CM

## 2023-01-16 DIAGNOSIS — N83.209 UNSPECIFIED OVARIAN CYST, UNSPECIFIED SIDE: Chronic | ICD-10-CM

## 2023-01-16 PROCEDURE — 93971 EXTREMITY STUDY: CPT | Mod: 26,LT

## 2023-01-16 PROCEDURE — 99284 EMERGENCY DEPT VISIT MOD MDM: CPT

## 2023-01-16 RX ORDER — ACETAMINOPHEN 500 MG
975 TABLET ORAL ONCE
Refills: 0 | Status: COMPLETED | OUTPATIENT
Start: 2023-01-16 | End: 2023-01-16

## 2023-01-16 RX ORDER — OXYCODONE HYDROCHLORIDE 5 MG/1
5 TABLET ORAL ONCE
Refills: 0 | Status: DISCONTINUED | OUTPATIENT
Start: 2023-01-16 | End: 2023-01-16

## 2023-01-16 RX ORDER — KETOROLAC TROMETHAMINE 30 MG/ML
15 SYRINGE (ML) INJECTION ONCE
Refills: 0 | Status: DISCONTINUED | OUTPATIENT
Start: 2023-01-16 | End: 2023-01-16

## 2023-01-16 RX ADMIN — OXYCODONE HYDROCHLORIDE 5 MILLIGRAM(S): 5 TABLET ORAL at 21:05

## 2023-01-16 RX ADMIN — Medication 15 MILLIGRAM(S): at 19:13

## 2023-01-16 RX ADMIN — Medication 975 MILLIGRAM(S): at 21:05

## 2023-01-16 NOTE — ED PROVIDER NOTE - PATIENT PORTAL LINK FT
You can access the FollowMyHealth Patient Portal offered by [facility] by registering at the following website: [email or web address]. By joining [facility]’s FollowMyHealth portal, you will also be able to view your health information using other applications (apps) compatible with our system.

## 2023-01-16 NOTE — ED PROVIDER NOTE - NSFOLLOWUPCLINICS_GEN_ALL_ED_FT
A Rheumatologist  Rheumatology  .  [place]   Phone:   Fax:     [facility] Rhe[facility]  Rheumatology  [address] [address]  Phone: [phone]  Fax:

## 2023-01-16 NOTE — ED PROVIDER NOTE - PROGRESS NOTE DETAILS
Patient reports improvement on symptoms. Spoke to patient about results. Plan to discharge patient. Patient given PCP follow up and return precautions. Patient agrees with plan.

## 2023-01-16 NOTE — ED PROVIDER NOTE - ATTENDING CONTRIBUTION TO CARE
62 y F hx of HTN, HLD, DM, asthma, remote history of one gout flare (not on chronic medications), pw 4 days of L foot and calf pain. Patient was seen in urgent care and referred here for rule out DVT.  Patient is overall well-appearing, normal vital signs, exam notable for left foot swelling and tenderness to the great toe with some referred pain and swelling up to the calf.  No overlying erythema, afebrile.  Patient well-appearing, suspect gout flare was podiatry more than DVT, but given patient's family history will obtain duplex to rule out DVT treat with NSAIDs for likely gout flare.

## 2023-01-16 NOTE — ED ADULT NURSE REASSESSMENT NOTE - NS ED NURSE REASSESS COMMENT FT1
Report received from day RNAnnalise. A&Ox4 and amb. C?o left foot, big toe pain. Reports 9/10 pain level. Medicated as ordered. Denies CP, SOB, nausea, vomiting, HA, fever or chills. Resp even and unlabored. No acute distress noted. Pt appears sitting up in bed, HOB elevated. Speaking in full and complete sentences. Bed in lowest position, call bell in reach, wheels locked, safety maintained. Awaiting further orders.

## 2023-01-16 NOTE — ED PROVIDER NOTE - NSFOLLOWUPINSTRUCTIONS_ED_ALL_ED_FT
You were seen in the Emergency Department for Ankle/foot pain related to gout flare.     1) Advance activity as tolerated.   2) Continue all previously prescribed medications as directed.  Take ibuprofen 400mg and acetaminophen 975mg every 6-8 hours for pain.   3) Follow up with your primary care physician in 3-5 days - take copies of your results.    4) Return to the Emergency Department for worsening or persistent symptoms, and/or ANY NEW OR CONCERNING SYMPTOMS. You were seen in the Emergency Department for Ankle/foot pain related to gout flare.     1) Advance activity as tolerated.   2) Continue all previously prescribed medications as directed.  Take ibuprofen 400mg and acetaminophen 975mg every 6-8 hours for pain.   3) Follow up with a rheumatologist and your primary care physician within 3-5 days - take copies of your results.    4) Return to the Emergency Department for worsening or persistent symptoms, and/or ANY NEW OR CONCERNING SYMPTOMS.

## 2023-01-16 NOTE — ED ADULT TRIAGE NOTE - CHIEF COMPLAINT QUOTE
Pt arrives ambulatory to triage, sent by urgent care to r/o DVT to Left LE. Pt endorses swelling and severe pain to LLE since Thursday. Family hx of DVTs. LLE swollen and warm to touch, faint pulse palpated. RR even & unlabored, denies CP. PMH: DM, HLD, HTN, asthma.

## 2023-01-16 NOTE — ED PROVIDER NOTE - NS ED ROS FT
GENERAL: No fever, chills  EYES: no vision changes, no discharge.   ENT: no difficulty swallowing or speaking   CARDIAC: no chest pain/pressure, SOB  PULMONARY: no cough, SOB  GI: no abdominal pain, n/v/d  : no dysuria, no hematuria  SKIN: no rashes, no ecchymosis  NEURO: no headache, lightheadedness  MSK: + foot pain and swelling, + calf pain, no myalgia, weakness.

## 2023-01-16 NOTE — ED PROVIDER NOTE - CLINICAL SUMMARY MEDICAL DECISION MAKING FREE TEXT BOX
62 y F hx of HTN, HLD, DM, asthma, remote history of one gout flare (not on chronic medications), pw 4 days of L foot and calf pain. + left base of first toe ttp, mild warmth and edema of left foot. + mild ttp L superior calf and popliteal fossa. ddx: gout, cellulitis, DVT, very low suspicion for PE as pt has normal vitals, no chest pain, no sob. DVT study and symptom treatment with toradol. Will not treat with steroids given hx of DM. Anticipate out-patient follow up. 62 y F hx of HTN, HLD, DM, asthma, remote history of one gout flare (not on chronic medications), pw 4 days of L foot and calf pain. + left base of first toe ttp, mild warmth and edema of left foot. + mild ttp L superior calf and popliteal fossa. ddx: gout, cellulitis, DVT, very low suspicion for PE as pt has normal vitals, no chest pain, no sob. DVT study and symptom treatment with toradol. Will not treat with steroids given hx of DM. Anticipate out-patient follow up.    Dr. Acosta: 62 y F hx of HTN, HLD, DM, asthma, remote history of one gout flare (not on chronic medications), pw 4 days of L foot and calf pain. Patient was seen in urgent care and referred here for rule out DVT.  Patient is overall well-appearing, normal vital signs, exam notable for left foot swelling and tenderness to the great toe with some referred pain and swelling up to the calf.  No overlying erythema, afebrile.  Patient well-appearing, suspect gout flare more than DVT, no c/f cellulitis given no erythema or induration, but given patient's family history will obtain duplex to rule out DVT treat with NSAIDs for likely gout flare. Unna Boot Text: An Unna boot was placed to help immobilize the limb and facilitate more rapid healing.

## 2023-01-16 NOTE — ED PROVIDER NOTE - PHYSICAL EXAMINATION
GEN: Patient awake alert NAD.   HEENT: normocephalic, atraumatic, EOMI, no scleral icterus, moist MM  CARDIAC: RRR, S1, S2, no murmur.   PULM: CTA B/L no wheeze, rhonchi, rales.   ABD: soft NT, ND, no rebound no guarding  MSK: + left base of first toe ttp, mild warmth and edema of left foot. + mild ttp L superior calf and popliteal fossa.     NEURO: A&Ox3, no focal neurological deficits, CN 2-12 grossly intact  SKIN: warm, dry, no rash.

## 2023-01-16 NOTE — ED PROVIDER NOTE - OBJECTIVE STATEMENT
62 y F hx of HTN, HLD, DM, asthma, remote history of one gout flare (not on chronic medications), pw 4 days of L foot and calf pain. foot pain is left base of first toe, associated with mild swelling and warmth of left foot. pt able to range L toes and ankles. Calf pain is L popliteal fossa and super calf, no associated sob, cp, tachycardia, or hypoxia. Pt has family hx of DVT, mother and sister. not on AC. Pt has been taking APAP, ibuprofen, indomethacin for pain. only took 2 apap today.

## 2023-01-30 ENCOUNTER — APPOINTMENT (OUTPATIENT)
Dept: ORTHOPEDIC SURGERY | Facility: CLINIC | Age: 63
End: 2023-01-30
Payer: COMMERCIAL

## 2023-01-30 VITALS — WEIGHT: 270 LBS | HEIGHT: 64 IN | BODY MASS INDEX: 46.1 KG/M2

## 2023-01-30 DIAGNOSIS — S76.119A STRAIN OF UNSPECIFIED QUADRICEPS MUSCLE, FASCIA AND TENDON, INITIAL ENCOUNTER: ICD-10-CM

## 2023-01-30 PROCEDURE — 99214 OFFICE O/P EST MOD 30 MIN: CPT

## 2023-01-30 NOTE — ASSESSMENT
[FreeTextEntry1] : 62F with mild L knee OA, L quad tendon strain\par \par MRI reviewed\par Continue PT\par NSAIDs for pain\par return 8 weeks\par \par The patient was advised of the diagnosis. The natural history of the pathology was explained in full to the patient in layman's terms. All questions were answered. The risks and benefits of surgical and non-surgical treatment alternatives were explained in full to the patient. \par

## 2023-01-30 NOTE — HISTORY OF PRESENT ILLNESS
[0] : 0 [de-identified] : 12/21/22: 63yo F with longstanding left knee pain for the past few years with no injury. She has been treated for this issue by Dr. Murcia with multiple CSIs, visco series, and anti-inflammatories that are no longer providing sufficient relief. PT somewhat beneficial. Admits to increasing pain and difficulty with prolonged walking and stairs. She has been to the ER multiple times due to pain and has received Toradol inj to temporary relief. \par \par 1/30/23: f/u L knee MRI, symptoms slowly improving with PT [] : no [FreeTextEntry1] : left knee  [FreeTextEntry5] :  JACQUIE GRANDA is a 62 year female who is here today for left knee Mri results. States she is doing better. currently has no pain.  [de-identified] : MRI

## 2023-01-30 NOTE — DISCUSSION/SUMMARY
[de-identified] : The natural progression of Osteoarthritis was explained to the patient.  We discussed the possible treatment options from conservative to operative.  These included NSAIDS, Glucosamine and Chondrotin sulfate, and Physical Therapy as well different types of injections.  We also discussed that at some point they may progress to needed a TKA.  Information and pamphlets were given.\par \par Entered by Eliana Aguirre PA-C working under the supervision of Félix Sommers MD. \par Patient seen by Eliana Aguirre PA-C under the supervision of Félix Sommers MD.

## 2023-01-30 NOTE — PHYSICAL EXAM
[Left] : left knee [5___] : hamstring 5[unfilled]/5 [] : patient ambulates with assistive device [TWNoteComboBox7] : flexion 115 degrees [de-identified] : extension 0 degrees

## 2023-02-13 ENCOUNTER — APPOINTMENT (OUTPATIENT)
Dept: ORTHOPEDIC SURGERY | Facility: CLINIC | Age: 63
End: 2023-02-13
Payer: COMMERCIAL

## 2023-02-13 ENCOUNTER — APPOINTMENT (OUTPATIENT)
Dept: VASCULAR SURGERY | Facility: CLINIC | Age: 63
End: 2023-02-13

## 2023-02-13 VITALS — BODY MASS INDEX: 46.1 KG/M2 | WEIGHT: 270 LBS | HEIGHT: 64 IN

## 2023-02-13 DIAGNOSIS — M65.312 TRIGGER THUMB, LEFT THUMB: ICD-10-CM

## 2023-02-13 DIAGNOSIS — Z86.39 PERSONAL HISTORY OF OTHER ENDOCRINE, NUTRITIONAL AND METABOLIC DISEASE: ICD-10-CM

## 2023-02-13 DIAGNOSIS — I10 ESSENTIAL (PRIMARY) HYPERTENSION: ICD-10-CM

## 2023-02-13 DIAGNOSIS — M25.542 PAIN IN JOINTS OF RIGHT HAND: ICD-10-CM

## 2023-02-13 DIAGNOSIS — M18.11 UNILATERAL PRIMARY OSTEOARTHRITIS OF FIRST CARPOMETACARPAL JOINT, RIGHT HAND: ICD-10-CM

## 2023-02-13 DIAGNOSIS — M25.541 PAIN IN JOINTS OF RIGHT HAND: ICD-10-CM

## 2023-02-13 DIAGNOSIS — M18.12 UNILATERAL PRIMARY OSTEOARTHRITIS OF FIRST CARPOMETACARPAL JOINT, LEFT HAND: ICD-10-CM

## 2023-02-13 PROCEDURE — 20550 NJX 1 TENDON SHEATH/LIGAMENT: CPT | Mod: 50

## 2023-02-13 PROCEDURE — 99214 OFFICE O/P EST MOD 30 MIN: CPT | Mod: 25

## 2023-02-13 RX ORDER — SIMVASTATIN 40 MG/1
40 TABLET, FILM COATED ORAL
Refills: 0 | Status: ACTIVE | COMMUNITY

## 2023-02-13 RX ORDER — DICLOFENAC SODIUM 1 %
1 KIT TOPICAL DAILY
Qty: 1 | Refills: 0 | Status: ACTIVE | COMMUNITY
Start: 2023-02-13 | End: 1900-01-01

## 2023-02-13 RX ORDER — GLIMEPIRIDE 4 MG/1
4 TABLET ORAL
Refills: 0 | Status: ACTIVE | COMMUNITY

## 2023-02-13 RX ORDER — DULAGLUTIDE 4.5 MG/.5ML
INJECTION, SOLUTION SUBCUTANEOUS
Refills: 0 | Status: ACTIVE | COMMUNITY

## 2023-02-13 NOTE — HISTORY OF PRESENT ILLNESS
[de-identified] : 2/13/2023: rhd 61 yo female here with 6-7 mos of bilateral hand pain.\par There is no hx of trauma. Right hand pain is > than left hand pain.\par Pain is worse with grasping and lifting.\par \par PMH: htn, hyperlipidemia, asthma, dm2 (controlled Last A1C was 6.9)\par Allergies: PCN (hives)

## 2023-02-13 NOTE — IMAGING
[de-identified] : left thumb  TTP a1 pulley\par +triggering\par otherwise farom\par NVID\par \par \par Bilateral wrists with no skin changes/bony deformity (right wrist surgical scar is noted)\par Bilateral basal joints with ttp R>L.\par Left Basal Joint Compression Test is positive / Left Basal Joint Compression Test is negative.\par TFCC Grind Tests are negative symmetrically.\par Tinel Signs are negative over the bilateral Carpal Tunnels.\par Velez Tests are negative symmetrically.

## 2023-02-13 NOTE — ASSESSMENT
[FreeTextEntry1] : Pt provided left thumb TF CSI #1 today.\par RTO in 1 mos for fu care.\par Pt provided rx for bilateral Gamekeeper brace and she is instructed on activity modification today. \par Rx for Voltaran gel was provided. \par \par We reviewed the anatomy of the flexor sheath and pathology of trigger fingers with the use of drawings and discussion.  We discussed the treatment options including splinting/nsaids, injection and surgery.  We discussed that too many injections may lead to weakening o the tendon/tendon rupture and the safety of two injections. After a discussion of the risks, benefits and alternatives along with the expectations, the patient was amenable to injection.  The indication for injection is pain and inflammation.  The skin overlying the tendon sheath/A1 pulley site was prepared with alcohol and ethyl chloride was sprayed topically.  Sterile technique was used. An injection of the left thumb  1ml of lidocaine and 6mg of betamethasone was used.  The patient was instructed to call if redness, pain or fever occur.  They ay apply ice for 15 minutes eery hour for the next 12-24 hours as tolerated.    The patient understands that it may take 2-5 days to see a noticeable difference.  Sterile Band-Aid was applied.\par

## 2023-03-03 NOTE — ED PROVIDER NOTE - CPE EDP SKIN NORM
Thank you for your visit today!    Please follow up with Dr. Martinez in 6 months for post void residual, flow and PSA prior.     Obtain a PSA prior to your follow up appointment.      48 hours before your PSA test you should NOT:  Ride a bike, motorcycle or tractor or anything that puts pressure on the prostate region.   Have had a Digital rectal exam.   Ejaculate or participate in any sexual activity that involves ejaculation.    
normal...

## 2023-03-20 ENCOUNTER — APPOINTMENT (OUTPATIENT)
Dept: ORTHOPEDIC SURGERY | Facility: CLINIC | Age: 63
End: 2023-03-20

## 2023-08-14 ENCOUNTER — APPOINTMENT (OUTPATIENT)
Dept: ORTHOPEDIC SURGERY | Facility: CLINIC | Age: 63
End: 2023-08-14
Payer: COMMERCIAL

## 2023-08-14 VITALS — WEIGHT: 271 LBS | BODY MASS INDEX: 46.26 KG/M2 | HEIGHT: 64 IN

## 2023-08-14 PROCEDURE — 99203 OFFICE O/P NEW LOW 30 MIN: CPT | Mod: 25

## 2023-08-14 PROCEDURE — J3490M: CUSTOM | Mod: LT

## 2023-08-14 PROCEDURE — 73562 X-RAY EXAM OF KNEE 3: CPT | Mod: LT

## 2023-08-14 PROCEDURE — 20610 DRAIN/INJ JOINT/BURSA W/O US: CPT | Mod: LT

## 2023-08-14 RX ORDER — ATORVASTATIN CALCIUM 80 MG/1
TABLET, FILM COATED ORAL
Refills: 0 | Status: ACTIVE | COMMUNITY

## 2023-08-14 NOTE — ASSESSMENT
[FreeTextEntry1] : Xrays reviewed with patient Treatment options discussed  injection done today, tolerated well continue ibuprofen as needed Follow up with Dr. Sommers in 2 weeks

## 2023-08-14 NOTE — HISTORY OF PRESENT ILLNESS
[10] : 10 [8] : 8 [de-identified] : 8/14/23: Patient is a 64 yo female c/o left knee pain for 3 days with no specific injury. Hx of knee OA. Has had injections in past. Tried advil with little relief. Pain is worse with walking and stairs. No previous surgeries to left knee.  [] : no [FreeTextEntry5] : friday morning pain returned in the left knee. hx of left knee pain. nothing has helped that she normally tries (ice, ibuprofen). wants an injection.

## 2023-08-14 NOTE — PROCEDURE
[FreeTextEntry3] : Large joint corticosteroid injection given: left knee  Patient indicated for injection after trial of rest, OTC medications including aspirin, Ibuprofen, Aleve etc or prescription NSAIDS, and/or exercises at home and/ or physical therapy without satisfactory response.  Patient has symptoms including pain, swelling, and/or decreased mobility in the joint. The risks, benefits, and alternatives to corticosteroid injection were explained in full to the patient, including but not limited to infection, sepsis, bleeding, scarring, skin discoloration, temporary increase in pain, syncopal episode, failure to resolve symptoms, allergic reaction, symptom recurrence, and elevation of blood sugar in diabetics. Patient understood the risks. All questions were answered. After discussion of options, patient requested an injection.   Oral informed consent was obtained and sterile technique was utilized for the procedure including the preparation of the solutions used for the injection and betadine followed by alcohol prep to the injection site. Anesthesia was given with ethyl chloride sprayed topically. The injection was delivered. Patient tolerated the procedure well.   Post Procedure Instructions: Patient was advised to call if redness, pain, or fever occur and apply ice for 15 min on and 15 min off later today  Medications delivered: 2 cc celestone, 3 cc lidocaine, 3 cc marcaine

## 2023-08-14 NOTE — PHYSICAL EXAM
[NL (0)] : extension 0 degrees [5___] : hamstring 5[unfilled]/5 [Equivocal] : equivocal Jesus [Left] : left knee [Degenerative change] : Degenerative change [Moderate patellofemoral OA] : Moderate patellofemoral OA [] : no erythema [TWNoteComboBox7] : flexion 110 degrees

## 2023-09-29 ENCOUNTER — EMERGENCY (EMERGENCY)
Facility: HOSPITAL | Age: 63
LOS: 1 days | Discharge: HOME | End: 2023-09-29
Attending: EMERGENCY MEDICINE | Admitting: EMERGENCY MEDICINE
Payer: COMMERCIAL

## 2023-09-29 VITALS
DIASTOLIC BLOOD PRESSURE: 65 MMHG | RESPIRATION RATE: 18 BRPM | TEMPERATURE: 98 F | HEART RATE: 91 BPM | SYSTOLIC BLOOD PRESSURE: 121 MMHG | OXYGEN SATURATION: 100 %

## 2023-09-29 VITALS
HEART RATE: 86 BPM | SYSTOLIC BLOOD PRESSURE: 117 MMHG | OXYGEN SATURATION: 98 % | RESPIRATION RATE: 16 BRPM | DIASTOLIC BLOOD PRESSURE: 69 MMHG

## 2023-09-29 DIAGNOSIS — N83.209 UNSPECIFIED OVARIAN CYST, UNSPECIFIED SIDE: Chronic | ICD-10-CM

## 2023-09-29 DIAGNOSIS — D25.9 LEIOMYOMA OF UTERUS, UNSPECIFIED: Chronic | ICD-10-CM

## 2023-09-29 LAB
ALBUMIN SERPL ELPH-MCNC: 4.6 G/DL — SIGNIFICANT CHANGE UP (ref 3.3–5)
ALP SERPL-CCNC: 144 U/L — HIGH (ref 40–120)
ALT FLD-CCNC: 29 U/L — SIGNIFICANT CHANGE UP (ref 4–33)
ANION GAP SERPL CALC-SCNC: 16 MMOL/L — HIGH (ref 7–14)
ANISOCYTOSIS BLD QL: SIGNIFICANT CHANGE UP
AST SERPL-CCNC: 32 U/L — SIGNIFICANT CHANGE UP (ref 4–32)
BASE EXCESS BLDV CALC-SCNC: -4.2 MMOL/L — LOW (ref -2–3)
BASOPHILS # BLD AUTO: 0 K/UL — SIGNIFICANT CHANGE UP (ref 0–0.2)
BASOPHILS NFR BLD AUTO: 0 % — SIGNIFICANT CHANGE UP (ref 0–2)
BILIRUB SERPL-MCNC: 0.3 MG/DL — SIGNIFICANT CHANGE UP (ref 0.2–1.2)
BLOOD GAS VENOUS COMPREHENSIVE RESULT: SIGNIFICANT CHANGE UP
BUN SERPL-MCNC: 21 MG/DL — SIGNIFICANT CHANGE UP (ref 7–23)
CALCIUM SERPL-MCNC: 9.5 MG/DL — SIGNIFICANT CHANGE UP (ref 8.4–10.5)
CHLORIDE BLDV-SCNC: 100 MMOL/L — SIGNIFICANT CHANGE UP (ref 96–108)
CHLORIDE SERPL-SCNC: 94 MMOL/L — LOW (ref 98–107)
CO2 BLDV-SCNC: 24 MMOL/L — SIGNIFICANT CHANGE UP (ref 22–26)
CO2 SERPL-SCNC: 22 MMOL/L — SIGNIFICANT CHANGE UP (ref 22–31)
CREAT SERPL-MCNC: 1.02 MG/DL — SIGNIFICANT CHANGE UP (ref 0.5–1.3)
EGFR: 62 ML/MIN/1.73M2 — SIGNIFICANT CHANGE UP
EOSINOPHIL # BLD AUTO: 0 K/UL — SIGNIFICANT CHANGE UP (ref 0–0.5)
EOSINOPHIL NFR BLD AUTO: 0 % — SIGNIFICANT CHANGE UP (ref 0–6)
GAS PNL BLDV: 131 MMOL/L — LOW (ref 136–145)
GAS PNL BLDV: SIGNIFICANT CHANGE UP
GIANT PLATELETS BLD QL SMEAR: PRESENT — SIGNIFICANT CHANGE UP
GLUCOSE BLDV-MCNC: 172 MG/DL — HIGH (ref 70–99)
GLUCOSE SERPL-MCNC: 152 MG/DL — HIGH (ref 70–99)
HCO3 BLDV-SCNC: 23 MMOL/L — SIGNIFICANT CHANGE UP (ref 22–29)
HCT VFR BLD CALC: 42.1 % — SIGNIFICANT CHANGE UP (ref 34.5–45)
HCT VFR BLDA CALC: 41 % — SIGNIFICANT CHANGE UP (ref 34.5–46.5)
HGB BLD CALC-MCNC: 13.7 G/DL — SIGNIFICANT CHANGE UP (ref 11.7–16.1)
HGB BLD-MCNC: 13.6 G/DL — SIGNIFICANT CHANGE UP (ref 11.5–15.5)
IANC: 16.12 K/UL — HIGH (ref 1.8–7.4)
LACTATE BLDV-MCNC: 1.5 MMOL/L — SIGNIFICANT CHANGE UP (ref 0.5–2)
LIDOCAIN IGE QN: 44 U/L — SIGNIFICANT CHANGE UP (ref 7–60)
LYMPHOCYTES # BLD AUTO: 2.01 K/UL — SIGNIFICANT CHANGE UP (ref 1–3.3)
LYMPHOCYTES # BLD AUTO: 9.7 % — LOW (ref 13–44)
MACROCYTES BLD QL: SIGNIFICANT CHANGE UP
MCHC RBC-ENTMCNC: 27.6 PG — SIGNIFICANT CHANGE UP (ref 27–34)
MCHC RBC-ENTMCNC: 32.3 GM/DL — SIGNIFICANT CHANGE UP (ref 32–36)
MCV RBC AUTO: 85.4 FL — SIGNIFICANT CHANGE UP (ref 80–100)
METAMYELOCYTES # FLD: 1.8 % — HIGH (ref 0–1)
MONOCYTES # BLD AUTO: 0.54 K/UL — SIGNIFICANT CHANGE UP (ref 0–0.9)
MONOCYTES NFR BLD AUTO: 2.6 % — SIGNIFICANT CHANGE UP (ref 2–14)
NEUTROPHILS # BLD AUTO: 17.64 K/UL — HIGH (ref 1.8–7.4)
NEUTROPHILS NFR BLD AUTO: 78.9 % — HIGH (ref 43–77)
NEUTS BAND # BLD: 6.1 % — HIGH (ref 0–6)
PCO2 BLDV: 47 MMHG — SIGNIFICANT CHANGE UP (ref 39–52)
PH BLDV: 7.29 — LOW (ref 7.32–7.43)
PLAT MORPH BLD: SIGNIFICANT CHANGE UP
PLATELET # BLD AUTO: 230 K/UL — SIGNIFICANT CHANGE UP (ref 150–400)
PLATELET COUNT - ESTIMATE: NORMAL — SIGNIFICANT CHANGE UP
PO2 BLDV: 33 MMHG — SIGNIFICANT CHANGE UP (ref 25–45)
POTASSIUM BLDV-SCNC: 4.6 MMOL/L — SIGNIFICANT CHANGE UP (ref 3.5–5.1)
POTASSIUM SERPL-MCNC: 4.7 MMOL/L — SIGNIFICANT CHANGE UP (ref 3.5–5.3)
POTASSIUM SERPL-SCNC: 4.7 MMOL/L — SIGNIFICANT CHANGE UP (ref 3.5–5.3)
PROT SERPL-MCNC: 7.9 G/DL — SIGNIFICANT CHANGE UP (ref 6–8.3)
RBC # BLD: 4.93 M/UL — SIGNIFICANT CHANGE UP (ref 3.8–5.2)
RBC # FLD: 14.4 % — SIGNIFICANT CHANGE UP (ref 10.3–14.5)
RBC BLD AUTO: ABNORMAL
SAO2 % BLDV: 51.8 % — LOW (ref 67–88)
SODIUM SERPL-SCNC: 132 MMOL/L — LOW (ref 135–145)
TROPONIN T, HIGH SENSITIVITY RESULT: 8 NG/L — SIGNIFICANT CHANGE UP
VARIANT LYMPHS # BLD: 0.9 % — SIGNIFICANT CHANGE UP (ref 0–6)
WBC # BLD: 20.75 K/UL — HIGH (ref 3.8–10.5)
WBC # FLD AUTO: 20.75 K/UL — HIGH (ref 3.8–10.5)

## 2023-09-29 PROCEDURE — 93010 ELECTROCARDIOGRAM REPORT: CPT

## 2023-09-29 PROCEDURE — 74177 CT ABD & PELVIS W/CONTRAST: CPT | Mod: 26,MA

## 2023-09-29 PROCEDURE — 99285 EMERGENCY DEPT VISIT HI MDM: CPT

## 2023-09-29 RX ORDER — METRONIDAZOLE 500 MG
500 TABLET ORAL ONCE
Refills: 0 | Status: COMPLETED | OUTPATIENT
Start: 2023-09-29 | End: 2023-09-29

## 2023-09-29 RX ORDER — METRONIDAZOLE 500 MG
1 TABLET ORAL
Qty: 30 | Refills: 0
Start: 2023-09-29 | End: 2023-10-08

## 2023-09-29 RX ORDER — CIPROFLOXACIN LACTATE 400MG/40ML
500 VIAL (ML) INTRAVENOUS ONCE
Refills: 0 | Status: COMPLETED | OUTPATIENT
Start: 2023-09-29 | End: 2023-09-29

## 2023-09-29 RX ORDER — MORPHINE SULFATE 50 MG/1
4 CAPSULE, EXTENDED RELEASE ORAL ONCE
Refills: 0 | Status: DISCONTINUED | OUTPATIENT
Start: 2023-09-29 | End: 2023-09-29

## 2023-09-29 RX ORDER — SODIUM CHLORIDE 9 MG/ML
1000 INJECTION, SOLUTION INTRAVENOUS ONCE
Refills: 0 | Status: COMPLETED | OUTPATIENT
Start: 2023-09-29 | End: 2023-09-29

## 2023-09-29 RX ORDER — FAMOTIDINE 10 MG/ML
20 INJECTION INTRAVENOUS ONCE
Refills: 0 | Status: COMPLETED | OUTPATIENT
Start: 2023-09-29 | End: 2023-09-29

## 2023-09-29 RX ORDER — CIPROFLOXACIN LACTATE 400MG/40ML
1 VIAL (ML) INTRAVENOUS
Qty: 20 | Refills: 0
Start: 2023-09-29 | End: 2023-10-08

## 2023-09-29 RX ORDER — ONDANSETRON 8 MG/1
4 TABLET, FILM COATED ORAL ONCE
Refills: 0 | Status: COMPLETED | OUTPATIENT
Start: 2023-09-29 | End: 2023-09-29

## 2023-09-29 RX ORDER — ACETAMINOPHEN 500 MG
1000 TABLET ORAL ONCE
Refills: 0 | Status: COMPLETED | OUTPATIENT
Start: 2023-09-29 | End: 2023-09-29

## 2023-09-29 RX ADMIN — Medication 400 MILLIGRAM(S): at 09:16

## 2023-09-29 RX ADMIN — ONDANSETRON 4 MILLIGRAM(S): 8 TABLET, FILM COATED ORAL at 09:15

## 2023-09-29 RX ADMIN — SODIUM CHLORIDE 1000 MILLILITER(S): 9 INJECTION, SOLUTION INTRAVENOUS at 10:21

## 2023-09-29 RX ADMIN — Medication 500 MILLIGRAM(S): at 11:33

## 2023-09-29 RX ADMIN — FAMOTIDINE 20 MILLIGRAM(S): 10 INJECTION INTRAVENOUS at 09:15

## 2023-09-29 RX ADMIN — Medication 30 MILLILITER(S): at 09:15

## 2023-09-29 RX ADMIN — MORPHINE SULFATE 4 MILLIGRAM(S): 50 CAPSULE, EXTENDED RELEASE ORAL at 09:35

## 2023-09-29 RX ADMIN — Medication 1000 MILLIGRAM(S): at 09:35

## 2023-09-29 RX ADMIN — SODIUM CHLORIDE 1000 MILLILITER(S): 9 INJECTION, SOLUTION INTRAVENOUS at 09:15

## 2023-09-29 NOTE — ED PROVIDER NOTE - PROGRESS NOTE DETAILS
KELLY Hayes - 64yo woman with PMH left breast cancer, on active chemotherapy with recent change to another regimen, presenting with subacute decreased appetite and 4 days of epigastric abd pain and watery diarrhea. States it is different from prior abd cramping she has had in the past. No infectious symptoms, patient tearful on exam and in pain post tylenol, will add morphine. CT for further evaluation of abd pain. consider chemo side effects vs colitis.

## 2023-09-29 NOTE — ED ADULT NURSE NOTE - BOWEL SOUNDS LLQ
[facility]  Neurosurgery  Discharge Summary      Patient Name: Cheryl Ghosh  MRN: [id number]  Admission Date: 12/20/2018  Hospital Length of Stay: 0 days  Discharge Date and Time:  12/20/2018 9:18 AM  Attending Physician: Xavier Dasilva MD   Discharging Provider: Wiliam Urias PA-C  Primary Care Provider: Joey Mueller MD    HPI:   Cheryl Ghosh is a very pleasant 84 y.o. female with history of hypertension, hyperlipidemia, urinary incontinence secondary to bladder prolapse, osteopenia, neuropathy, CKD stage 3, chronic diastolic heart failure, secondary hyperparathyroidism secondary to renal, multiple joint osteoarthritis, and chronic low back pain who reports lumbosacral pain, severe right SI joint pain that has been worsening over the last 10 years. The pain is interfering with walking, climbing up stairs. The pain can be stabbing and sharp. Pain is constant. Pain travels to the hip on the right side. She did Pilates and now is doing SI joint PT exercises on her own with some improvement.Received medial branch block with mild pain relief. Remember receiving a SI joint block in the office under ultrasound with some relief. Not using a SI joint belt.     Patient is here for elective right SI joint steroid injection.        Procedure(s) (LRB):  INJECTION, STEROID-- Right SI Joint Block and  Steroid Injection (Right)     Hospital Course: Patient presented to or for right SI joint steroid injection.  she tolerated the procedure well, and there were no intra-operative difficulties. She recovered in outpatient surgery, where her pain was controlled. Patient was able to void on her own, ambulated without assistance, and tolerated PO diet. On 12/19/2018, patient was discharged home with pain medication and follow up appointments. Regular diet. Activity as tolerated. At the time of discharge, vital signs were stable, patient was afebrile and neurologically stable.  she  was counseled on wound care,  activity restrictions, and follow up prior to discharge.  Discharge instructions were given verbally/written to the patient and their family. All of their questions were answered. Patient and family voiced understanding. They were encouraged to call the clinic with any questions they might have prior to the follow up appointments.           Pending Diagnostic Studies:     Procedure Component Value Units Date/Time    FL Less Than 1 Hour [[id number]] Resulted:  12/20/18 0910    Order Status:  Sent Lab Status:  In process Updated:  12/20/18 0911        Final Active Diagnoses:    Diagnosis Date Noted POA    PRINCIPAL PROBLEM:  Chronic SI joint pain [M53.3, G89.29] 12/20/2018 Yes      Problems Resolved During this Admission:      Discharged Condition: good    Disposition: home    Follow Up:  -2 week follow up with Dr. Dasilva in clinic    Patient Instructions:   -Please see the patient instructions tab for detailed instructions and follow up information         Diet general     Call MD for:  temperature >100.4     Call MD for:  persistent nausea and vomiting     Call MD for:  severe uncontrolled pain     Call MD for:  difficulty breathing, headache or visual disturbances     Call MD for:  redness, tenderness, or signs of infection (pain, swelling, redness, odor or green/yellow discharge around incision site)     Call MD for:  hives     Call MD for:  persistent dizziness or light-headedness     Call MD for:  extreme fatigue     Medications:  Reconciled Home Medications:      Medication List      CONTINUE taking these medications    aspirin 81 MG Chew  Take 1 tablet (81 mg total) by mouth once daily.     b complex vitamins tablet  Take 1 tablet by mouth once daily.     estradiol 0.01 % (0.1 mg/gram) vaginal cream  Commonly known as:  ESTRACE  Place 1 g vaginally once daily. Use 1 gram of estrogen cream in vagina nightly x 2 weeks, then twice a week thereafter. .     gabapentin 300 MG capsule  Commonly known as:   NEURONTIN  Take 1 capsule (300 mg total) by mouth every evening.     lisinopril 20 MG tablet  Commonly known as:  PRINIVIL,ZESTRIL  Take 1 tablet (20 mg total) by mouth 2 (two) times daily.     meloxicam 15 MG tablet  Commonly known as:  MOBIC  Take 1 tablet (15 mg total) by mouth daily as needed for Pain.     omega-3 acid ethyl esters 1 gram capsule  Commonly known as:  LOVAZA  Take 2 g by mouth 2 (two) times daily.     pravastatin 10 MG tablet  Commonly known as:  PRAVACHOL  Take 1 tablet (10 mg total) by mouth once daily.     VITAMIN B-6 100 MG Tab  Generic drug:  pyridoxine (vitamin B6)  Take 100 mg by mouth once daily.            Wiliam Urias PA-C  Neurosurgery  [facility]   present

## 2023-09-29 NOTE — ED ADULT NURSE NOTE - NSFALLRISKINTERV_ED_ALL_ED

## 2023-09-29 NOTE — ED ADULT NURSE NOTE - OBJECTIVE STATEMENT
Pt reports recent start of chemotherapy for breast CA and has been having 4 days of abd discomfort, watery diarrhea, and nausea. Denies chest pain, and sob.

## 2023-09-29 NOTE — ED ADULT TRIAGE NOTE - CHIEF COMPLAINT QUOTE
Pt c/o upper abdominal pain, diarrhea x 4 days. Pt recently started chemo for breast Ca 10 days ago. Denies nausea, fever.

## 2023-09-29 NOTE — ED PROVIDER NOTE - PATIENT PORTAL LINK FT
You can access the FollowMyHealth Patient Portal offered by [facility] by registering at the following website: [email or web address]. By joining [facility]’s FollowMyHealth portal, you will also be able to view your health information using other applications (apps) compatible with our system.

## 2023-09-29 NOTE — ED PROVIDER NOTE - DISPOSITION TYPE
-RPM kit return order was placed. -Writer spoke to the pt, she was notified that Elba Nicolas will come to her home address to  the kit within 1-3 business days. Pt asked to package the equipment in HRS box.
DISCHARGE

## 2023-09-29 NOTE — ED PROVIDER NOTE - NSFOLLOWUPINSTRUCTIONS_ED_ALL_ED_FT
Diverticulitis    WHAT YOU NEED TO KNOW:    Diverticulitis is a condition that causes small pockets along your intestine called diverticula to become inflamed or infected. This is caused by hard bowel movements, food, or bacteria that get stuck in the pockets.  Diverticula    DISCHARGE INSTRUCTIONS:    Return to the emergency department if:    You urinate less than usual or not at all.    You cannot stop vomiting.    You have severe abdominal pain, a fever, and your abdomen is larger than usual.    You have new or increased blood in your bowel movements.    You are unable to tolerate eating or drinking due to vomiting.    Call your doctor if:    You have pain when you urinate.    Your symptoms get worse or do not go away.    You have questions or concerns about your condition or care.    Medicines:  Antibiotics may be given to help treat a bacterial infection.    To control your pain at home, you should take Ibuprofen 400 mg along with Tylenol 650mg-1000mg every 6 to 8 hours. Limit your maximum daily Tylenol from all sources to 4000mg. Be aware that many other medications contain acetaminophen which is also known as Tylenol. Taking Tylenol and Ibuprofen together has been shown to be more effective at relieving pain than taking them separately. These are both over the counter medications that you can  at your local pharmacy without a prescription. You need to respect all of the warnings on the bottles. You shouldn’t take these medications for more than a week without following up with your doctor. Both medications come with certain risks and side effects that you need to discuss with your doctor, especially if you are taking them for a prolonged period.    Take your medicine as directed. Contact your healthcare provider if you think your medicine is not helping or if you have side effects. Tell your provider if you are allergic to any medicine. Keep a list of the medicines, vitamins, and herbs you take. Include the amounts, and when and why you take them. Bring the list or the pill bottles to follow-up visits. Carry your medicine list with you in case of an emergency.  Clear liquid diet: A clear liquid diet includes any liquids that you can see through. Examples include water, ginger-aviva, cranberry or apple juice, frozen fruit ice, or broth. Stay on a clear liquid diet until your symptoms are gone, or as directed.    Follow up with your doctor as directed: You may need to return for a colonoscopy. When your symptoms are gone, you may need a low-fat, high-fiber diet to prevent diverticulitis from developing again. Your healthcare provider or dietitian can help you create meal plans. Write down your questions so you remember to ask them during your visits.

## 2023-09-29 NOTE — ED PROVIDER NOTE - ATTENDING CONTRIBUTION TO CARE
Agree with resident note  63-year-old female history of breast cancer on chemotherapy, hypertension, hyperlipidemia, diabetes presents with abdominal pain for 4 days with diarrhea.  States about 10 days ago went to her cancer center for dehydration.  States decreased p.o. intake.  Unclear if abdominal pain is due to cramping and hunger or due to actual pathology per patient.  States no blood or bile in diarrhea.  Did not take her insulin today because of decreased p.o. intake.  Physical exam  Well-appearing female in no distress, anxious  Vital signs stable  Clear to auscultation bilaterally  S1-S2 no murmurs rubs or gallops  Abdomen mild tenderness throughout  Extremities no edema  Impression  Patient demonstrates no symptoms of obstruction, pain appears to be related to diarrhea and decreased p.o. intake  That being said given 4 days of belly pain will get CT to rule out surgical pathology  We will treat with IV fluid and IV pain meds and then reassess

## 2023-09-29 NOTE — ED PROVIDER NOTE - OBJECTIVE STATEMENT
63-year-old female, history of left breast cancer on chemotherapy, hypertension, hyperlipidemia, diabetes, presents with abdominal pain, loss of appetite, diarrhea.  Reports she started chemotherapy 19 days ago and since then has been unable to eat or drink.  4 days ago developed epigastric pain that is constant, nonradiating, feels like a starvation.  Also with more than 10 bouts per day of watery diarrhea during this time.  Has only tried antinausea medication.  Denies any new foods, travel, sick contacts.  Missed her insulin yesterday.  Has been able to take her other medications.  No fevers, vomiting, chest pain, shortness of breath, urinary symptoms.

## 2023-09-29 NOTE — ED PROVIDER NOTE - PHYSICAL EXAMINATION
Physical Exam:  Gen: NAD, AOx3, non-toxic appearing  Head: NCAT  HEENT: EOMI, PEERLA, normal conjunctiva, tongue midline, oral mucosa moist  Lung: CTAB, no respiratory distress, no wheezes/rhonchi/rales B/L, speaking in full sentences  CV: RRR, no murmurs, rubs or gallops  Abd: soft, tender in epigastric region, ND, no guarding, no rigidity, no rebound tenderness  MSK: no visible deformities, ROM normal in UE/LE, no back pain  Neuro: No focal sensory or motor deficits  Skin: Warm, well perfused, no rash, no leg swelling  Psych: normal affect, calm

## 2023-10-02 RX ORDER — MOXIFLOXACIN HYDROCHLORIDE TABLETS, 400 MG 400 MG/1
1 TABLET, FILM COATED ORAL
Qty: 7 | Refills: 0
Start: 2023-10-02 | End: 2023-10-08

## 2023-10-02 NOTE — ED POST DISCHARGE NOTE - REASON FOR FOLLOW-UP
Pt called stating that she is having an allergic reaction to her medications. Pt was prescribed cipro and flagyl for diverticulitis. Pt is unsure of previous reaction. pt admits to hives and itchiness. Denies facial or tongue swelling, difficulty breathing or swallowing, n/v. Advised pt to stop those medications and take benadryl for itchiness. Pt is also allergic to penicillin with similar reaction. Will send Moxifloxacin 400mg PO QD for 7 days. Educated on allergic reactions and what to look out for. Advised to present  back to the ER if she has a reaction to this medication. Other

## 2023-12-17 ENCOUNTER — INPATIENT (INPATIENT)
Facility: HOSPITAL | Age: 63
LOS: 2 days | Discharge: HOME | End: 2023-12-20
Attending: STUDENT IN AN ORGANIZED HEALTH CARE EDUCATION/TRAINING PROGRAM | Admitting: STUDENT IN AN ORGANIZED HEALTH CARE EDUCATION/TRAINING PROGRAM
Payer: COMMERCIAL

## 2023-12-17 VITALS
RESPIRATION RATE: 20 BRPM | DIASTOLIC BLOOD PRESSURE: 78 MMHG | TEMPERATURE: 98 F | SYSTOLIC BLOOD PRESSURE: 135 MMHG | OXYGEN SATURATION: 100 % | HEART RATE: 100 BPM

## 2023-12-17 DIAGNOSIS — N83.209 UNSPECIFIED OVARIAN CYST, UNSPECIFIED SIDE: Chronic | ICD-10-CM

## 2023-12-17 DIAGNOSIS — E11.9 TYPE 2 DIABETES MELLITUS WITHOUT COMPLICATIONS: ICD-10-CM

## 2023-12-17 DIAGNOSIS — I10 ESSENTIAL (PRIMARY) HYPERTENSION: ICD-10-CM

## 2023-12-17 DIAGNOSIS — N12 TUBULO-INTERSTITIAL NEPHRITIS, NOT SPECIFIED AS ACUTE OR CHRONIC: ICD-10-CM

## 2023-12-17 DIAGNOSIS — E78.5 HYPERLIPIDEMIA, UNSPECIFIED: ICD-10-CM

## 2023-12-17 DIAGNOSIS — D61.810 ANTINEOPLASTIC CHEMOTHERAPY INDUCED PANCYTOPENIA: ICD-10-CM

## 2023-12-17 DIAGNOSIS — C50.919 MALIGNANT NEOPLASM OF UNSPECIFIED SITE OF UNSPECIFIED FEMALE BREAST: ICD-10-CM

## 2023-12-17 DIAGNOSIS — Z29.9 ENCOUNTER FOR PROPHYLACTIC MEASURES, UNSPECIFIED: ICD-10-CM

## 2023-12-17 DIAGNOSIS — D25.9 LEIOMYOMA OF UTERUS, UNSPECIFIED: Chronic | ICD-10-CM

## 2023-12-17 LAB
ALBUMIN SERPL ELPH-MCNC: 4.4 G/DL — SIGNIFICANT CHANGE UP (ref 3.3–5)
ALBUMIN SERPL ELPH-MCNC: 4.4 G/DL — SIGNIFICANT CHANGE UP (ref 3.3–5)
ALP SERPL-CCNC: 128 U/L — HIGH (ref 40–120)
ALP SERPL-CCNC: 128 U/L — HIGH (ref 40–120)
ALT FLD-CCNC: 15 U/L — SIGNIFICANT CHANGE UP (ref 4–33)
ALT FLD-CCNC: 15 U/L — SIGNIFICANT CHANGE UP (ref 4–33)
ANION GAP SERPL CALC-SCNC: 14 MMOL/L — SIGNIFICANT CHANGE UP (ref 7–14)
ANION GAP SERPL CALC-SCNC: 14 MMOL/L — SIGNIFICANT CHANGE UP (ref 7–14)
ANISOCYTOSIS BLD QL: SLIGHT — SIGNIFICANT CHANGE UP
ANISOCYTOSIS BLD QL: SLIGHT — SIGNIFICANT CHANGE UP
APPEARANCE UR: CLEAR — SIGNIFICANT CHANGE UP
APPEARANCE UR: CLEAR — SIGNIFICANT CHANGE UP
AST SERPL-CCNC: 15 U/L — SIGNIFICANT CHANGE UP (ref 4–32)
AST SERPL-CCNC: 15 U/L — SIGNIFICANT CHANGE UP (ref 4–32)
BACTERIA # UR AUTO: ABNORMAL /HPF
BACTERIA # UR AUTO: ABNORMAL /HPF
BASE EXCESS BLDV CALC-SCNC: 3.4 MMOL/L — HIGH (ref -2–3)
BASE EXCESS BLDV CALC-SCNC: 3.4 MMOL/L — HIGH (ref -2–3)
BASOPHILS # BLD AUTO: 0 K/UL — SIGNIFICANT CHANGE UP (ref 0–0.2)
BASOPHILS # BLD AUTO: 0 K/UL — SIGNIFICANT CHANGE UP (ref 0–0.2)
BASOPHILS NFR BLD AUTO: 0 % — SIGNIFICANT CHANGE UP (ref 0–2)
BASOPHILS NFR BLD AUTO: 0 % — SIGNIFICANT CHANGE UP (ref 0–2)
BILIRUB SERPL-MCNC: 1.5 MG/DL — HIGH (ref 0.2–1.2)
BILIRUB SERPL-MCNC: 1.5 MG/DL — HIGH (ref 0.2–1.2)
BILIRUB UR-MCNC: NEGATIVE — SIGNIFICANT CHANGE UP
BILIRUB UR-MCNC: NEGATIVE — SIGNIFICANT CHANGE UP
BLOOD GAS VENOUS COMPREHENSIVE RESULT: SIGNIFICANT CHANGE UP
BLOOD GAS VENOUS COMPREHENSIVE RESULT: SIGNIFICANT CHANGE UP
BUN SERPL-MCNC: 24 MG/DL — HIGH (ref 7–23)
BUN SERPL-MCNC: 24 MG/DL — HIGH (ref 7–23)
CALCIUM SERPL-MCNC: 10.1 MG/DL — SIGNIFICANT CHANGE UP (ref 8.4–10.5)
CALCIUM SERPL-MCNC: 10.1 MG/DL — SIGNIFICANT CHANGE UP (ref 8.4–10.5)
CAST: 0 /LPF — SIGNIFICANT CHANGE UP (ref 0–4)
CAST: 0 /LPF — SIGNIFICANT CHANGE UP (ref 0–4)
CHLORIDE BLDV-SCNC: 100 MMOL/L — SIGNIFICANT CHANGE UP (ref 96–108)
CHLORIDE BLDV-SCNC: 100 MMOL/L — SIGNIFICANT CHANGE UP (ref 96–108)
CHLORIDE SERPL-SCNC: 97 MMOL/L — LOW (ref 98–107)
CHLORIDE SERPL-SCNC: 97 MMOL/L — LOW (ref 98–107)
CO2 BLDV-SCNC: 29.9 MMOL/L — HIGH (ref 22–26)
CO2 BLDV-SCNC: 29.9 MMOL/L — HIGH (ref 22–26)
CO2 SERPL-SCNC: 26 MMOL/L — SIGNIFICANT CHANGE UP (ref 22–31)
CO2 SERPL-SCNC: 26 MMOL/L — SIGNIFICANT CHANGE UP (ref 22–31)
COLOR SPEC: YELLOW — SIGNIFICANT CHANGE UP
COLOR SPEC: YELLOW — SIGNIFICANT CHANGE UP
CREAT SERPL-MCNC: 0.89 MG/DL — SIGNIFICANT CHANGE UP (ref 0.5–1.3)
CREAT SERPL-MCNC: 0.89 MG/DL — SIGNIFICANT CHANGE UP (ref 0.5–1.3)
DACRYOCYTES BLD QL SMEAR: SLIGHT — SIGNIFICANT CHANGE UP
DACRYOCYTES BLD QL SMEAR: SLIGHT — SIGNIFICANT CHANGE UP
DIFF PNL FLD: NEGATIVE — SIGNIFICANT CHANGE UP
DIFF PNL FLD: NEGATIVE — SIGNIFICANT CHANGE UP
EGFR: 73 ML/MIN/1.73M2 — SIGNIFICANT CHANGE UP
EGFR: 73 ML/MIN/1.73M2 — SIGNIFICANT CHANGE UP
EOSINOPHIL # BLD AUTO: 0 K/UL — SIGNIFICANT CHANGE UP (ref 0–0.5)
EOSINOPHIL # BLD AUTO: 0 K/UL — SIGNIFICANT CHANGE UP (ref 0–0.5)
EOSINOPHIL NFR BLD AUTO: 0 % — SIGNIFICANT CHANGE UP (ref 0–6)
EOSINOPHIL NFR BLD AUTO: 0 % — SIGNIFICANT CHANGE UP (ref 0–6)
GAS PNL BLDV: 135 MMOL/L — LOW (ref 136–145)
GAS PNL BLDV: 135 MMOL/L — LOW (ref 136–145)
GAS PNL BLDV: SIGNIFICANT CHANGE UP
GAS PNL BLDV: SIGNIFICANT CHANGE UP
GLUCOSE BLDV-MCNC: 93 MG/DL — SIGNIFICANT CHANGE UP (ref 70–99)
GLUCOSE BLDV-MCNC: 93 MG/DL — SIGNIFICANT CHANGE UP (ref 70–99)
GLUCOSE SERPL-MCNC: 112 MG/DL — HIGH (ref 70–99)
GLUCOSE SERPL-MCNC: 112 MG/DL — HIGH (ref 70–99)
GLUCOSE UR QL: NEGATIVE MG/DL — SIGNIFICANT CHANGE UP
GLUCOSE UR QL: NEGATIVE MG/DL — SIGNIFICANT CHANGE UP
HCO3 BLDV-SCNC: 28 MMOL/L — SIGNIFICANT CHANGE UP (ref 22–29)
HCO3 BLDV-SCNC: 28 MMOL/L — SIGNIFICANT CHANGE UP (ref 22–29)
HCT VFR BLD CALC: 30.9 % — LOW (ref 34.5–45)
HCT VFR BLD CALC: 30.9 % — LOW (ref 34.5–45)
HCT VFR BLDA CALC: 30 % — LOW (ref 34.5–46.5)
HCT VFR BLDA CALC: 30 % — LOW (ref 34.5–46.5)
HGB BLD CALC-MCNC: 10.1 G/DL — LOW (ref 11.7–16.1)
HGB BLD CALC-MCNC: 10.1 G/DL — LOW (ref 11.7–16.1)
HGB BLD-MCNC: 9.9 G/DL — LOW (ref 11.5–15.5)
HGB BLD-MCNC: 9.9 G/DL — LOW (ref 11.5–15.5)
IANC: 1.87 K/UL — SIGNIFICANT CHANGE UP (ref 1.8–7.4)
IANC: 1.87 K/UL — SIGNIFICANT CHANGE UP (ref 1.8–7.4)
KETONES UR-MCNC: ABNORMAL MG/DL
KETONES UR-MCNC: ABNORMAL MG/DL
LACTATE BLDV-MCNC: 1.2 MMOL/L — SIGNIFICANT CHANGE UP (ref 0.5–2)
LACTATE BLDV-MCNC: 1.2 MMOL/L — SIGNIFICANT CHANGE UP (ref 0.5–2)
LEUKOCYTE ESTERASE UR-ACNC: ABNORMAL
LEUKOCYTE ESTERASE UR-ACNC: ABNORMAL
LYMPHOCYTES # BLD AUTO: 1.13 K/UL — SIGNIFICANT CHANGE UP (ref 1–3.3)
LYMPHOCYTES # BLD AUTO: 1.13 K/UL — SIGNIFICANT CHANGE UP (ref 1–3.3)
LYMPHOCYTES # BLD AUTO: 33.6 % — SIGNIFICANT CHANGE UP (ref 13–44)
LYMPHOCYTES # BLD AUTO: 33.6 % — SIGNIFICANT CHANGE UP (ref 13–44)
MACROCYTES BLD QL: SLIGHT — SIGNIFICANT CHANGE UP
MACROCYTES BLD QL: SLIGHT — SIGNIFICANT CHANGE UP
MAGNESIUM SERPL-MCNC: 1.5 MG/DL — LOW (ref 1.6–2.6)
MAGNESIUM SERPL-MCNC: 1.5 MG/DL — LOW (ref 1.6–2.6)
MANUAL SMEAR VERIFICATION: SIGNIFICANT CHANGE UP
MANUAL SMEAR VERIFICATION: SIGNIFICANT CHANGE UP
MCHC RBC-ENTMCNC: 29.3 PG — SIGNIFICANT CHANGE UP (ref 27–34)
MCHC RBC-ENTMCNC: 29.3 PG — SIGNIFICANT CHANGE UP (ref 27–34)
MCHC RBC-ENTMCNC: 32 GM/DL — SIGNIFICANT CHANGE UP (ref 32–36)
MCHC RBC-ENTMCNC: 32 GM/DL — SIGNIFICANT CHANGE UP (ref 32–36)
MCV RBC AUTO: 91.4 FL — SIGNIFICANT CHANGE UP (ref 80–100)
MCV RBC AUTO: 91.4 FL — SIGNIFICANT CHANGE UP (ref 80–100)
METAMYELOCYTES # FLD: 0.9 % — SIGNIFICANT CHANGE UP (ref 0–1)
METAMYELOCYTES # FLD: 0.9 % — SIGNIFICANT CHANGE UP (ref 0–1)
MONOCYTES # BLD AUTO: 0.03 K/UL — SIGNIFICANT CHANGE UP (ref 0–0.9)
MONOCYTES # BLD AUTO: 0.03 K/UL — SIGNIFICANT CHANGE UP (ref 0–0.9)
MONOCYTES NFR BLD AUTO: 0.9 % — LOW (ref 2–14)
MONOCYTES NFR BLD AUTO: 0.9 % — LOW (ref 2–14)
NEUTROPHILS # BLD AUTO: 1.81 K/UL — SIGNIFICANT CHANGE UP (ref 1.8–7.4)
NEUTROPHILS # BLD AUTO: 1.81 K/UL — SIGNIFICANT CHANGE UP (ref 1.8–7.4)
NEUTROPHILS NFR BLD AUTO: 45.5 % — SIGNIFICANT CHANGE UP (ref 43–77)
NEUTROPHILS NFR BLD AUTO: 45.5 % — SIGNIFICANT CHANGE UP (ref 43–77)
NEUTS BAND # BLD: 8.2 % — HIGH (ref 0–6)
NEUTS BAND # BLD: 8.2 % — HIGH (ref 0–6)
NITRITE UR-MCNC: NEGATIVE — SIGNIFICANT CHANGE UP
NITRITE UR-MCNC: NEGATIVE — SIGNIFICANT CHANGE UP
NT-PROBNP SERPL-SCNC: <36 PG/ML — SIGNIFICANT CHANGE UP
NT-PROBNP SERPL-SCNC: <36 PG/ML — SIGNIFICANT CHANGE UP
OVALOCYTES BLD QL SMEAR: SLIGHT — SIGNIFICANT CHANGE UP
OVALOCYTES BLD QL SMEAR: SLIGHT — SIGNIFICANT CHANGE UP
PCO2 BLDV: 45 MMHG — SIGNIFICANT CHANGE UP (ref 39–52)
PCO2 BLDV: 45 MMHG — SIGNIFICANT CHANGE UP (ref 39–52)
PH BLDV: 7.41 — SIGNIFICANT CHANGE UP (ref 7.32–7.43)
PH BLDV: 7.41 — SIGNIFICANT CHANGE UP (ref 7.32–7.43)
PH UR: 8 — SIGNIFICANT CHANGE UP (ref 5–8)
PH UR: 8 — SIGNIFICANT CHANGE UP (ref 5–8)
PHOSPHATE SERPL-MCNC: 2.3 MG/DL — LOW (ref 2.5–4.5)
PHOSPHATE SERPL-MCNC: 2.3 MG/DL — LOW (ref 2.5–4.5)
PLAT MORPH BLD: NORMAL — SIGNIFICANT CHANGE UP
PLAT MORPH BLD: NORMAL — SIGNIFICANT CHANGE UP
PLATELET # BLD AUTO: 124 K/UL — LOW (ref 150–400)
PLATELET # BLD AUTO: 124 K/UL — LOW (ref 150–400)
PLATELET COUNT - ESTIMATE: ABNORMAL
PLATELET COUNT - ESTIMATE: ABNORMAL
PO2 BLDV: 33 MMHG — SIGNIFICANT CHANGE UP (ref 25–45)
PO2 BLDV: 33 MMHG — SIGNIFICANT CHANGE UP (ref 25–45)
POIKILOCYTOSIS BLD QL AUTO: SLIGHT — SIGNIFICANT CHANGE UP
POIKILOCYTOSIS BLD QL AUTO: SLIGHT — SIGNIFICANT CHANGE UP
POLYCHROMASIA BLD QL SMEAR: SLIGHT — SIGNIFICANT CHANGE UP
POLYCHROMASIA BLD QL SMEAR: SLIGHT — SIGNIFICANT CHANGE UP
POTASSIUM BLDV-SCNC: 4 MMOL/L — SIGNIFICANT CHANGE UP (ref 3.5–5.1)
POTASSIUM BLDV-SCNC: 4 MMOL/L — SIGNIFICANT CHANGE UP (ref 3.5–5.1)
POTASSIUM SERPL-MCNC: 4 MMOL/L — SIGNIFICANT CHANGE UP (ref 3.5–5.3)
POTASSIUM SERPL-MCNC: 4 MMOL/L — SIGNIFICANT CHANGE UP (ref 3.5–5.3)
POTASSIUM SERPL-SCNC: 4 MMOL/L — SIGNIFICANT CHANGE UP (ref 3.5–5.3)
POTASSIUM SERPL-SCNC: 4 MMOL/L — SIGNIFICANT CHANGE UP (ref 3.5–5.3)
PROT SERPL-MCNC: 6.7 G/DL — SIGNIFICANT CHANGE UP (ref 6–8.3)
PROT SERPL-MCNC: 6.7 G/DL — SIGNIFICANT CHANGE UP (ref 6–8.3)
PROT UR-MCNC: 30 MG/DL
PROT UR-MCNC: 30 MG/DL
RBC # BLD: 3.38 M/UL — LOW (ref 3.8–5.2)
RBC # BLD: 3.38 M/UL — LOW (ref 3.8–5.2)
RBC # FLD: 16.5 % — HIGH (ref 10.3–14.5)
RBC # FLD: 16.5 % — HIGH (ref 10.3–14.5)
RBC BLD AUTO: ABNORMAL
RBC BLD AUTO: ABNORMAL
RBC CASTS # UR COMP ASSIST: 2 /HPF — SIGNIFICANT CHANGE UP (ref 0–4)
RBC CASTS # UR COMP ASSIST: 2 /HPF — SIGNIFICANT CHANGE UP (ref 0–4)
SAO2 % BLDV: 52 % — LOW (ref 67–88)
SAO2 % BLDV: 52 % — LOW (ref 67–88)
SMUDGE CELLS # BLD: PRESENT — SIGNIFICANT CHANGE UP
SMUDGE CELLS # BLD: PRESENT — SIGNIFICANT CHANGE UP
SODIUM SERPL-SCNC: 137 MMOL/L — SIGNIFICANT CHANGE UP (ref 135–145)
SODIUM SERPL-SCNC: 137 MMOL/L — SIGNIFICANT CHANGE UP (ref 135–145)
SP GR SPEC: 1.02 — SIGNIFICANT CHANGE UP (ref 1–1.03)
SP GR SPEC: 1.02 — SIGNIFICANT CHANGE UP (ref 1–1.03)
SQUAMOUS # UR AUTO: 0 /HPF — SIGNIFICANT CHANGE UP (ref 0–5)
SQUAMOUS # UR AUTO: 0 /HPF — SIGNIFICANT CHANGE UP (ref 0–5)
TROPONIN T, HIGH SENSITIVITY RESULT: 11 NG/L — SIGNIFICANT CHANGE UP
TROPONIN T, HIGH SENSITIVITY RESULT: 11 NG/L — SIGNIFICANT CHANGE UP
UROBILINOGEN FLD QL: 1 MG/DL — SIGNIFICANT CHANGE UP (ref 0.2–1)
UROBILINOGEN FLD QL: 1 MG/DL — SIGNIFICANT CHANGE UP (ref 0.2–1)
VARIANT LYMPHS # BLD: 10.9 % — HIGH (ref 0–6)
VARIANT LYMPHS # BLD: 10.9 % — HIGH (ref 0–6)
WBC # BLD: 3.37 K/UL — LOW (ref 3.8–10.5)
WBC # BLD: 3.37 K/UL — LOW (ref 3.8–10.5)
WBC # FLD AUTO: 3.37 K/UL — LOW (ref 3.8–10.5)
WBC # FLD AUTO: 3.37 K/UL — LOW (ref 3.8–10.5)
WBC UR QL: 135 /HPF — HIGH (ref 0–5)
WBC UR QL: 135 /HPF — HIGH (ref 0–5)

## 2023-12-17 PROCEDURE — 71260 CT THORAX DX C+: CPT | Mod: 26,MA

## 2023-12-17 PROCEDURE — 99223 1ST HOSP IP/OBS HIGH 75: CPT | Mod: GC

## 2023-12-17 PROCEDURE — 99285 EMERGENCY DEPT VISIT HI MDM: CPT

## 2023-12-17 PROCEDURE — 74177 CT ABD & PELVIS W/CONTRAST: CPT | Mod: 26,MA

## 2023-12-17 PROCEDURE — 71045 X-RAY EXAM CHEST 1 VIEW: CPT | Mod: 26

## 2023-12-17 RX ORDER — DEXTROSE 50 % IN WATER 50 %
15 SYRINGE (ML) INTRAVENOUS ONCE
Refills: 0 | Status: DISCONTINUED | OUTPATIENT
Start: 2023-12-17 | End: 2023-12-20

## 2023-12-17 RX ORDER — SODIUM,POTASSIUM PHOSPHATES 278-250MG
1 POWDER IN PACKET (EA) ORAL ONCE
Refills: 0 | Status: COMPLETED | OUTPATIENT
Start: 2023-12-17 | End: 2023-12-17

## 2023-12-17 RX ORDER — PANTOPRAZOLE SODIUM 20 MG/1
40 TABLET, DELAYED RELEASE ORAL
Refills: 0 | Status: DISCONTINUED | OUTPATIENT
Start: 2023-12-17 | End: 2023-12-20

## 2023-12-17 RX ORDER — KETOROLAC TROMETHAMINE 30 MG/ML
15 SYRINGE (ML) INJECTION ONCE
Refills: 0 | Status: DISCONTINUED | OUTPATIENT
Start: 2023-12-17 | End: 2023-12-17

## 2023-12-17 RX ORDER — ALBUTEROL 90 UG/1
0 AEROSOL, METERED ORAL
Qty: 0 | Refills: 0 | DISCHARGE

## 2023-12-17 RX ORDER — GLUCAGON INJECTION, SOLUTION 0.5 MG/.1ML
1 INJECTION, SOLUTION SUBCUTANEOUS ONCE
Refills: 0 | Status: DISCONTINUED | OUTPATIENT
Start: 2023-12-17 | End: 2023-12-20

## 2023-12-17 RX ORDER — INFLUENZA VIRUS VACCINE 15; 15; 15; 15 UG/.5ML; UG/.5ML; UG/.5ML; UG/.5ML
0.5 SUSPENSION INTRAMUSCULAR ONCE
Refills: 0 | Status: DISCONTINUED | OUTPATIENT
Start: 2023-12-17 | End: 2023-12-20

## 2023-12-17 RX ORDER — DEXTROSE 50 % IN WATER 50 %
12.5 SYRINGE (ML) INTRAVENOUS ONCE
Refills: 0 | Status: DISCONTINUED | OUTPATIENT
Start: 2023-12-17 | End: 2023-12-20

## 2023-12-17 RX ORDER — DEXTROSE 50 % IN WATER 50 %
25 SYRINGE (ML) INTRAVENOUS ONCE
Refills: 0 | Status: DISCONTINUED | OUTPATIENT
Start: 2023-12-17 | End: 2023-12-20

## 2023-12-17 RX ORDER — ATORVASTATIN CALCIUM 80 MG/1
40 TABLET, FILM COATED ORAL AT BEDTIME
Refills: 0 | Status: DISCONTINUED | OUTPATIENT
Start: 2023-12-17 | End: 2023-12-20

## 2023-12-17 RX ORDER — MAGNESIUM SULFATE 500 MG/ML
2 VIAL (ML) INJECTION ONCE
Refills: 0 | Status: DISCONTINUED | OUTPATIENT
Start: 2023-12-17 | End: 2023-12-17

## 2023-12-17 RX ORDER — CEFTRIAXONE 500 MG/1
1000 INJECTION, POWDER, FOR SOLUTION INTRAMUSCULAR; INTRAVENOUS ONCE
Refills: 0 | Status: COMPLETED | OUTPATIENT
Start: 2023-12-17 | End: 2023-12-17

## 2023-12-17 RX ORDER — LIDOCAINE 4 G/100G
1 CREAM TOPICAL ONCE
Refills: 0 | Status: COMPLETED | OUTPATIENT
Start: 2023-12-17 | End: 2023-12-17

## 2023-12-17 RX ORDER — INSULIN LISPRO 100/ML
VIAL (ML) SUBCUTANEOUS
Refills: 0 | Status: DISCONTINUED | OUTPATIENT
Start: 2023-12-17 | End: 2023-12-20

## 2023-12-17 RX ORDER — SODIUM CHLORIDE 9 MG/ML
1000 INJECTION, SOLUTION INTRAVENOUS
Refills: 0 | Status: DISCONTINUED | OUTPATIENT
Start: 2023-12-17 | End: 2023-12-20

## 2023-12-17 RX ORDER — TELMISARTAN 20 MG/1
0 TABLET ORAL
Qty: 0 | Refills: 2 | DISCHARGE

## 2023-12-17 RX ORDER — SODIUM CHLORIDE 9 MG/ML
1000 INJECTION INTRAMUSCULAR; INTRAVENOUS; SUBCUTANEOUS ONCE
Refills: 0 | Status: COMPLETED | OUTPATIENT
Start: 2023-12-17 | End: 2023-12-17

## 2023-12-17 RX ORDER — ENOXAPARIN SODIUM 100 MG/ML
40 INJECTION SUBCUTANEOUS EVERY 24 HOURS
Refills: 0 | Status: DISCONTINUED | OUTPATIENT
Start: 2023-12-17 | End: 2023-12-17

## 2023-12-17 RX ORDER — CHLORHEXIDINE GLUCONATE 213 G/1000ML
1 SOLUTION TOPICAL DAILY
Refills: 0 | Status: DISCONTINUED | OUTPATIENT
Start: 2023-12-17 | End: 2023-12-20

## 2023-12-17 RX ORDER — ACETAMINOPHEN 500 MG
650 TABLET ORAL EVERY 6 HOURS
Refills: 0 | Status: DISCONTINUED | OUTPATIENT
Start: 2023-12-17 | End: 2023-12-20

## 2023-12-17 RX ORDER — LOSARTAN POTASSIUM 100 MG/1
50 TABLET, FILM COATED ORAL DAILY
Refills: 0 | Status: DISCONTINUED | OUTPATIENT
Start: 2023-12-17 | End: 2023-12-20

## 2023-12-17 RX ORDER — INSULIN LISPRO 100/ML
VIAL (ML) SUBCUTANEOUS AT BEDTIME
Refills: 0 | Status: DISCONTINUED | OUTPATIENT
Start: 2023-12-17 | End: 2023-12-20

## 2023-12-17 RX ORDER — MORPHINE SULFATE 50 MG/1
2 CAPSULE, EXTENDED RELEASE ORAL ONCE
Refills: 0 | Status: DISCONTINUED | OUTPATIENT
Start: 2023-12-17 | End: 2023-12-17

## 2023-12-17 RX ORDER — MONTELUKAST 4 MG/1
10 TABLET, CHEWABLE ORAL DAILY
Refills: 0 | Status: DISCONTINUED | OUTPATIENT
Start: 2023-12-17 | End: 2023-12-20

## 2023-12-17 RX ORDER — ENOXAPARIN SODIUM 100 MG/ML
40 INJECTION SUBCUTANEOUS EVERY 12 HOURS
Refills: 0 | Status: DISCONTINUED | OUTPATIENT
Start: 2023-12-17 | End: 2023-12-20

## 2023-12-17 RX ORDER — CEFTRIAXONE 500 MG/1
1000 INJECTION, POWDER, FOR SOLUTION INTRAMUSCULAR; INTRAVENOUS EVERY 24 HOURS
Refills: 0 | Status: DISCONTINUED | OUTPATIENT
Start: 2023-12-18 | End: 2023-12-20

## 2023-12-17 RX ORDER — ACETAMINOPHEN 500 MG
1000 TABLET ORAL ONCE
Refills: 0 | Status: COMPLETED | OUTPATIENT
Start: 2023-12-17 | End: 2023-12-17

## 2023-12-17 RX ORDER — MAGNESIUM SULFATE 500 MG/ML
2 VIAL (ML) INJECTION ONCE
Refills: 0 | Status: COMPLETED | OUTPATIENT
Start: 2023-12-17 | End: 2023-12-17

## 2023-12-17 RX ADMIN — LIDOCAINE 1 PATCH: 4 CREAM TOPICAL at 22:00

## 2023-12-17 RX ADMIN — SODIUM CHLORIDE 1000 MILLILITER(S): 9 INJECTION INTRAMUSCULAR; INTRAVENOUS; SUBCUTANEOUS at 10:02

## 2023-12-17 RX ADMIN — CEFTRIAXONE 100 MILLIGRAM(S): 500 INJECTION, POWDER, FOR SOLUTION INTRAMUSCULAR; INTRAVENOUS at 12:51

## 2023-12-17 RX ADMIN — LIDOCAINE 1 PATCH: 4 CREAM TOPICAL at 10:02

## 2023-12-17 RX ADMIN — MORPHINE SULFATE 2 MILLIGRAM(S): 50 CAPSULE, EXTENDED RELEASE ORAL at 10:32

## 2023-12-17 RX ADMIN — Medication 400 MILLIGRAM(S): at 14:30

## 2023-12-17 RX ADMIN — MORPHINE SULFATE 2 MILLIGRAM(S): 50 CAPSULE, EXTENDED RELEASE ORAL at 10:02

## 2023-12-17 RX ADMIN — Medication 1 PACKET(S): at 13:01

## 2023-12-17 RX ADMIN — Medication 1000 MILLIGRAM(S): at 15:00

## 2023-12-17 RX ADMIN — ATORVASTATIN CALCIUM 40 MILLIGRAM(S): 80 TABLET, FILM COATED ORAL at 21:37

## 2023-12-17 RX ADMIN — Medication 25 GRAM(S): at 11:54

## 2023-12-17 RX ADMIN — LIDOCAINE 1 PATCH: 4 CREAM TOPICAL at 19:30

## 2023-12-17 NOTE — H&P ADULT - PROBLEM SELECTOR PLAN 3
Followed by Dr. Blankenship and on chemo, last dose on 12/12.  - Reach out to Dr. Blankenship at [facility] and acquire chemotherapy records. Patient states she is scheduled for surgery down the line  - Echo Followed by Dr. Blankenship and on chemo, last dose on 12/12.  - Reach out to Dr. Blankenship at [facility] and acquire chemotherapy records. Patient states she is scheduled for surgery down the line  - Echo Followed by Dr. Blankenship and on chemo, last dose on 12/12.  - Reach out to Dr. Blankenship at [facility] and acquire chemotherapy records. Patient states she is scheduled for surgery down the line Followed by Dr. Blankenship and on chemo, last dose on 12/12.  - Reach out to Dr. Blankenship at [facility] and acquire chemotherapy records. Patient states she is scheduled for surgery down the line

## 2023-12-17 NOTE — H&P ADULT - NSHPLABSRESULTS_GEN_ALL_CORE
Personally reviewed labs, imaging, ekg                           9.9    3.37  )-----------( 124      ( 17 Dec 2023 09:55 )             30.9           137  |  97<L>  |  24<H>  ----------------------------<  112<H>  4.0   |  26  |  0.89    Ca    10.1      17 Dec 2023 10:20  Phos  2.3       Mg     1.50         TPro  6.7  /  Alb  4.4  /  TBili  1.5<H>  /  DBili  x   /  AST  15  /  ALT  15  /  AlkPhos  128<H>                Urinalysis Basic - ( 17 Dec 2023 11:30 )    Color: Yellow / Appearance: Clear / S.019 / pH: x  Gluc: x / Ketone: Trace mg/dL  / Bili: Negative / Urobili: 1.0 mg/dL   Blood: x / Protein: 30 mg/dL / Nitrite: Negative   Leuk Esterase: Moderate / RBC: 2 /HPF /  /HPF   Sq Epi: x / Non Sq Epi: 0 /HPF / Bacteria: Occasional /HPF            Lactate Trend            CAPILLARY BLOOD GLUCOSE      POCT Blood Glucose.: 89 mg/dL (17 Dec 2023 16:04)            Personal interpretation EKG:

## 2023-12-17 NOTE — ED ADULT NURSE REASSESSMENT NOTE - NS ED NURSE REASSESS COMMENT FT1
Report to JOB John
BREAK RN: pt. remains A&Ox4, awake and resting. pt. offers no new complaints at this time. no acute distress noted. respirations even and unlabored. NSR on cardiac monitor. VS as noted.

## 2023-12-17 NOTE — ED ADULT TRIAGE NOTE - CHIEF COMPLAINT QUOTE
pt undergoing  treatment for breast cancer /last chemo on the 12th of dec/  pt c/o weakness back pain and burning on urination /  states I fel sob after walking/  pt is type diabetic

## 2023-12-17 NOTE — H&P ADULT - NSHPPHYSICALEXAM_GEN_ALL_CORE
ICU Vital Signs Last 24 Hrs  T(C): 36.7 (17 Dec 2023 16:42), Max: 36.7 (17 Dec 2023 12:19)  T(F): 98 (17 Dec 2023 16:42), Max: 98.1 (17 Dec 2023 12:19)  HR: 89 (17 Dec 2023 16:42) (89 - 100)  BP: 100/62 (17 Dec 2023 16:42) (100/62 - 135/78)  BP(mean): --  ABP: --  ABP(mean): --  RR: 18 (17 Dec 2023 16:42) (16 - 20)  SpO2: 100% (17 Dec 2023 16:42) (100% - 100%)    O2 Parameters below as of 17 Dec 2023 15:48  Patient On (Oxygen Delivery Method): room air    CONSTITUTIONAL: Well groomed, no apparent distress  ENMT: Oral mucosa with moist membranes.   RESP: No respiratory distress, no use of accessory muscles; CTA b/l, no WRR  CV: RRR, +S1S2, no MRG; no peripheral edema  GI: Soft, NT, ND  SKIN: No rashes or ulcers noted. Mild L CVA tenderness, no R CVA tenderness, no spinal tenderness  NEURO: awake and alert  Psych: normal mood and affect

## 2023-12-17 NOTE — H&P ADULT - PROBLEM SELECTOR PLAN 4
DM2, has been off insulin for a month. Prescribed 15U Lantus at home, but does not use because her BGs are 80s-90s.  - LDSSI

## 2023-12-17 NOTE — H&P ADULT - TIME BILLING
Review of laboratory data, radiology results, consultants' recommendations, documentation in [place], discussion with patient/advanced care providers and interdisciplinary staff (such as , social workers, etc). Interventions were performed as documented above. Review of laboratory data, radiology results, consultants' recommendations, documentation in [place], discussion with patient/advanced care providers and interdisciplinary staff (such as , social workers, etc). Interventions were performed as documented above.

## 2023-12-17 NOTE — H&P ADULT - PROBLEM SELECTOR PLAN 1
UA with moderate LE and bacteria. L CVA tenderness and urinary symptoms. Although, CT Abdomen Pelvis unrevealing. Patient was started on CTX, given fluids, and pain regimen, and has been improving. Admitted due to pancytopenia iso pancytopenia 2/2 chemo  - Hold on maintenance fluids at this time, cap refill normal  - Continue CTX for total of 5-7 days, transition to oral when urine culture sensitivities result  - F/u bcx

## 2023-12-17 NOTE — ED PROVIDER NOTE - PHYSICAL EXAMINATION
GENERAL: no acute distress  HEAD: normocephalic, atraumatic  HEENT: normal conjunctiva, oral mucosa moist,  CARDIAC: regular rate and rhythm, no appreciable murmurs  PULM: normal breath sounds, clear to ascultation bilaterally, no rales, rhonchi, wheezing  GI: abdomen nondistended, soft, nontender, no guarding, no rebound tenderness  : L CVA tenderness, no suprapubic tenderness  NEURO:  AAOx3  MSK: no peripheral edema, no calf tenderness b/l, left side of  to palpation over the lower posterior ribs, reproducible, no crepitus palpable.   SKIN: well-perfused, extremities warm, no visible rashes over the back or flanks   PSYCH: appropriate mood and affect

## 2023-12-17 NOTE — H&P ADULT - PROBLEM SELECTOR PLAN 2
Patient with pancytopenia after chemo on 12/12. Denies fevers and no signs of systemic symptoms of her infection at this time. ANC count appropriate.  - Continue to monitor CBC, bandemia at this time  - Hold on inpatient chemo while being treated for infection

## 2023-12-17 NOTE — PATIENT PROFILE ADULT - FALL HARM RISK - HARM RISK INTERVENTIONS
Assistance with ambulation/Assistance OOB with selected safe patient handling equipment/Communicate Risk of Fall with Harm to all staff/Reinforce activity limits and safety measures with patient and family/Tailored Fall Risk Interventions/Visual Cue: Yellow wristband and red socks/Bed in lowest position, wheels locked, appropriate side rails in place/Call bell, personal items and telephone in reach/Instruct patient to call for assistance before getting out of bed or chair/Non-slip footwear when patient is out of bed/[place] to call system/Physically safe environment - no spills, clutter or unnecessary equipment/Purposeful Proactive Rounding/Room/bathroom lighting operational, light cord in reach Assistance with ambulation/Assistance OOB with selected safe patient handling equipment/Communicate Risk of Fall with Harm to all staff/Reinforce activity limits and safety measures with patient and family/Tailored Fall Risk Interventions/Visual Cue: Yellow wristband and red socks/Bed in lowest position, wheels locked, appropriate side rails in place/Call bell, personal items and telephone in reach/Instruct patient to call for assistance before getting out of bed or chair/Non-slip footwear when patient is out of bed/[place] to call system/Physically safe environment - no spills, clutter or unnecessary equipment/Purposeful Proactive Rounding/Room/bathroom lighting operational, light cord in reach

## 2023-12-17 NOTE — ED PROVIDER NOTE - PROGRESS NOTE DETAILS
Ashley Hills MD, PGY2:  Reviewed results of labs and imaging, UA significant for 135 white cells with leukocyte Estrase, concerning for pyelonephritis, pt ordered for ceftriaxone. patient's magnesium and phosphorus are low, replete ordered.  Pt noted to be pancytopenic with low white blood cell count, hemoglobin 9.9, platelets of 124.  This is most likely in the setting of patient's chemotherapy.     Pending CT read at this time. Ashley Hills MD, PGY2:  CAT scan shows no acute pathology. Patient noted to have a bandemia of 8.  Patient informed of all findings at bedside, states that she was aware of the pancytopenia and receives injections for treatment of it. pt agrees with plan for vbg and blood cxs.  Had shared decision-making with patient, patient agrees with plan for admission for IV antibiotics and inpatient monitoring given patient is high risk secondary to immunocompromised state from chemotherapy.

## 2023-12-17 NOTE — ED ADULT NURSE NOTE - NSFALLUNIVINTERV_ED_ALL_ED
Bed/Stretcher in lowest position, wheels locked, appropriate side rails in place/Call bell, personal items and telephone in reach/Instruct patient to call for assistance before getting out of bed/chair/stretcher/Non-slip footwear applied when patient is off stretcher/[place] to call system/Physically safe environment - no spills, clutter or unnecessary equipment/Purposeful proactive rounding/Room/bathroom lighting operational, light cord in reach Bed/Stretcher in lowest position, wheels locked, appropriate side rails in place/Call bell, personal items and telephone in reach/Instruct patient to call for assistance before getting out of bed/chair/stretcher/Non-slip footwear applied when patient is off stretcher/[place] to call system/Physically safe environment - no spills, clutter or unnecessary equipment/Purposeful proactive rounding/Room/bathroom lighting operational, light cord in reach

## 2023-12-17 NOTE — H&P ADULT - ASSESSMENT
63F hx of DM2, HLD, HTN, breast ca on chemotherapy last chemo on 12/12 following with Dr. Blankenship at [facility] here for pyelonephritis iso pancytopenia after chemo on CTX 63F hx of DM2, HLD, HTN, breast ca on chemotherapy last chemo on 12/12 following with Dr. Blankenship at [facility] here for pyelonephritis iso pancytopenia after chemo on CTX

## 2023-12-17 NOTE — PATIENT PROFILE ADULT - FUNCTIONAL ASSESSMENT - BASIC MOBILITY 6.
4-calculated by average/Not able to assess (calculate score using [facility] averaging method) 4-calculated by average/Not able to assess (calculate score using [facility] averaging method)

## 2023-12-17 NOTE — H&P ADULT - ATTENDING COMMENTS
Pt. seen and examined on 12/17/23.     62 y/o F with PMH breast cancer (chemo @ [facility]), T2DM, HTN, HLD who presented for dysuria, urinary frequency and back pain admitted for pyelonephritis. Back pain improving. Dysuria and urinary frequency. Mild nausea. No vomiting. Fatigue ongoing with chemo. VSS. Afebrile. Well  appearing - non-toxic. Left CVA tenderness on exam. Labs significant for pancytopenia and bandemia of 8%. UA positive. CT chest AP negative. Started on ceftriaxone 1gq24. F/u BCx and UCx. Tylenol. Discussed obtaining collateral from [facility] oncology regarding breast cancer treatment course and prior history. Remainder of plan as stated above. Plan discussed with HS. Pt. seen and examined on 12/17/23.     64 y/o F with PMH breast cancer (chemo @ [facility]), T2DM, HTN, HLD who presented for dysuria, urinary frequency and back pain admitted for pyelonephritis. Back pain improving. Dysuria and urinary frequency. Mild nausea. No vomiting. Fatigue ongoing with chemo. VSS. Afebrile. Well  appearing - non-toxic. Left CVA tenderness on exam. Labs significant for pancytopenia and bandemia of 8%. UA positive. CT chest AP negative. Started on ceftriaxone 1gq24. F/u BCx and UCx. Tylenol. Discussed obtaining collateral from [facility] oncology regarding breast cancer treatment course and prior history. Remainder of plan as stated above. Plan discussed with HS.

## 2023-12-17 NOTE — ED PROVIDER NOTE - CLINICAL SUMMARY MEDICAL DECISION MAKING FREE TEXT BOX
63-year-old female history of hypertension, hyperlipidemia, diabetes, breast cancer on chemotherapy, last session was 12/12 presenting for evaluation of left back pain onset 3 days ago associated with generalized weakness and dysuria. also c/o DRAPER.  Patient is afebrile, hemodynamically stable saturating 100% on room air with reproducible left back pain over the posterior lower ribs/left CVA tenderness.    Differential diagnosis includes not limited to metastasis to bone from patient's cancer versus pyelonephritis versus musculoskeletal pain versus pneumonia.  Patient's dyspnea on exertion may be in the setting of her chemotherapy or possibly chemotherapy induced heart failure.  Plan to get a BNP.  Plan for  CT of the chest abdomen pelvis for further evaluation, chest x-ray, labs.  Will give liter fluids.  Will get UA and culture.

## 2023-12-17 NOTE — ED PROVIDER NOTE - OBJECTIVE STATEMENT
63-year-old female history of hypertension, hyperlipidemia, diabetes, breast cancer on chemotherapy, last session was 12/12 presenting for evaluation of left back pain onset 3 days ago associated with generalized weakness and dysuria.  Patient reports that she normally feels generalized weakness after her chemotherapy sessions.  Does not know any inciting factor for the back pain, states the pain worsens with movement, denies pain with inspiration, trauma, nausea, vomiting, chest pain, pain radiation, abdominal pain, diarrhea.  Patient denies any hematuria, fever, patient states that since she has been on chemo she has noticed shortness of breath with ambulation, denies any shortness of breath at rest or chest pain, denies any leg swelling.

## 2023-12-18 LAB
A1C WITH ESTIMATED AVERAGE GLUCOSE RESULT: 5.8 % — HIGH (ref 4–5.6)
A1C WITH ESTIMATED AVERAGE GLUCOSE RESULT: 5.8 % — HIGH (ref 4–5.6)
ALBUMIN SERPL ELPH-MCNC: 4.3 G/DL — SIGNIFICANT CHANGE UP (ref 3.3–5)
ALBUMIN SERPL ELPH-MCNC: 4.3 G/DL — SIGNIFICANT CHANGE UP (ref 3.3–5)
ALP SERPL-CCNC: 125 U/L — HIGH (ref 40–120)
ALP SERPL-CCNC: 125 U/L — HIGH (ref 40–120)
ALT FLD-CCNC: 16 U/L — SIGNIFICANT CHANGE UP (ref 4–33)
ALT FLD-CCNC: 16 U/L — SIGNIFICANT CHANGE UP (ref 4–33)
ANION GAP SERPL CALC-SCNC: 14 MMOL/L — SIGNIFICANT CHANGE UP (ref 7–14)
ANION GAP SERPL CALC-SCNC: 14 MMOL/L — SIGNIFICANT CHANGE UP (ref 7–14)
AST SERPL-CCNC: 17 U/L — SIGNIFICANT CHANGE UP (ref 4–32)
AST SERPL-CCNC: 17 U/L — SIGNIFICANT CHANGE UP (ref 4–32)
BASOPHILS # BLD AUTO: 0.02 K/UL — SIGNIFICANT CHANGE UP (ref 0–0.2)
BASOPHILS # BLD AUTO: 0.02 K/UL — SIGNIFICANT CHANGE UP (ref 0–0.2)
BASOPHILS NFR BLD AUTO: 0.8 % — SIGNIFICANT CHANGE UP (ref 0–2)
BASOPHILS NFR BLD AUTO: 0.8 % — SIGNIFICANT CHANGE UP (ref 0–2)
BILIRUB SERPL-MCNC: 1.1 MG/DL — SIGNIFICANT CHANGE UP (ref 0.2–1.2)
BILIRUB SERPL-MCNC: 1.1 MG/DL — SIGNIFICANT CHANGE UP (ref 0.2–1.2)
BUN SERPL-MCNC: 17 MG/DL — SIGNIFICANT CHANGE UP (ref 7–23)
BUN SERPL-MCNC: 17 MG/DL — SIGNIFICANT CHANGE UP (ref 7–23)
CALCIUM SERPL-MCNC: 9.7 MG/DL — SIGNIFICANT CHANGE UP (ref 8.4–10.5)
CALCIUM SERPL-MCNC: 9.7 MG/DL — SIGNIFICANT CHANGE UP (ref 8.4–10.5)
CHLORIDE SERPL-SCNC: 99 MMOL/L — SIGNIFICANT CHANGE UP (ref 98–107)
CHLORIDE SERPL-SCNC: 99 MMOL/L — SIGNIFICANT CHANGE UP (ref 98–107)
CO2 SERPL-SCNC: 24 MMOL/L — SIGNIFICANT CHANGE UP (ref 22–31)
CO2 SERPL-SCNC: 24 MMOL/L — SIGNIFICANT CHANGE UP (ref 22–31)
CREAT SERPL-MCNC: 0.82 MG/DL — SIGNIFICANT CHANGE UP (ref 0.5–1.3)
CREAT SERPL-MCNC: 0.82 MG/DL — SIGNIFICANT CHANGE UP (ref 0.5–1.3)
EGFR: 81 ML/MIN/1.73M2 — SIGNIFICANT CHANGE UP
EGFR: 81 ML/MIN/1.73M2 — SIGNIFICANT CHANGE UP
EOSINOPHIL # BLD AUTO: 0.01 K/UL — SIGNIFICANT CHANGE UP (ref 0–0.5)
EOSINOPHIL # BLD AUTO: 0.01 K/UL — SIGNIFICANT CHANGE UP (ref 0–0.5)
EOSINOPHIL NFR BLD AUTO: 0.4 % — SIGNIFICANT CHANGE UP (ref 0–6)
EOSINOPHIL NFR BLD AUTO: 0.4 % — SIGNIFICANT CHANGE UP (ref 0–6)
ESTIMATED AVERAGE GLUCOSE: 120 — SIGNIFICANT CHANGE UP
ESTIMATED AVERAGE GLUCOSE: 120 — SIGNIFICANT CHANGE UP
GLUCOSE BLDC GLUCOMTR-MCNC: 126 MG/DL — HIGH (ref 70–99)
GLUCOSE BLDC GLUCOMTR-MCNC: 126 MG/DL — HIGH (ref 70–99)
GLUCOSE BLDC GLUCOMTR-MCNC: 78 MG/DL — SIGNIFICANT CHANGE UP (ref 70–99)
GLUCOSE BLDC GLUCOMTR-MCNC: 78 MG/DL — SIGNIFICANT CHANGE UP (ref 70–99)
GLUCOSE BLDC GLUCOMTR-MCNC: 81 MG/DL — SIGNIFICANT CHANGE UP (ref 70–99)
GLUCOSE BLDC GLUCOMTR-MCNC: 81 MG/DL — SIGNIFICANT CHANGE UP (ref 70–99)
GLUCOSE SERPL-MCNC: 106 MG/DL — HIGH (ref 70–99)
GLUCOSE SERPL-MCNC: 106 MG/DL — HIGH (ref 70–99)
HCT VFR BLD CALC: 30.4 % — LOW (ref 34.5–45)
HCT VFR BLD CALC: 30.4 % — LOW (ref 34.5–45)
HGB BLD-MCNC: 9.9 G/DL — LOW (ref 11.5–15.5)
HGB BLD-MCNC: 9.9 G/DL — LOW (ref 11.5–15.5)
IANC: 1.06 K/UL — LOW (ref 1.8–7.4)
IANC: 1.06 K/UL — LOW (ref 1.8–7.4)
IMM GRANULOCYTES NFR BLD AUTO: 0.4 % — SIGNIFICANT CHANGE UP (ref 0–0.9)
IMM GRANULOCYTES NFR BLD AUTO: 0.4 % — SIGNIFICANT CHANGE UP (ref 0–0.9)
LDH SERPL L TO P-CCNC: 194 U/L — SIGNIFICANT CHANGE UP (ref 135–225)
LDH SERPL L TO P-CCNC: 194 U/L — SIGNIFICANT CHANGE UP (ref 135–225)
LYMPHOCYTES # BLD AUTO: 1.11 K/UL — SIGNIFICANT CHANGE UP (ref 1–3.3)
LYMPHOCYTES # BLD AUTO: 1.11 K/UL — SIGNIFICANT CHANGE UP (ref 1–3.3)
LYMPHOCYTES # BLD AUTO: 46.6 % — HIGH (ref 13–44)
LYMPHOCYTES # BLD AUTO: 46.6 % — HIGH (ref 13–44)
MAGNESIUM SERPL-MCNC: 1.7 MG/DL — SIGNIFICANT CHANGE UP (ref 1.6–2.6)
MAGNESIUM SERPL-MCNC: 1.7 MG/DL — SIGNIFICANT CHANGE UP (ref 1.6–2.6)
MCHC RBC-ENTMCNC: 29.6 PG — SIGNIFICANT CHANGE UP (ref 27–34)
MCHC RBC-ENTMCNC: 29.6 PG — SIGNIFICANT CHANGE UP (ref 27–34)
MCHC RBC-ENTMCNC: 32.6 GM/DL — SIGNIFICANT CHANGE UP (ref 32–36)
MCHC RBC-ENTMCNC: 32.6 GM/DL — SIGNIFICANT CHANGE UP (ref 32–36)
MCV RBC AUTO: 91 FL — SIGNIFICANT CHANGE UP (ref 80–100)
MCV RBC AUTO: 91 FL — SIGNIFICANT CHANGE UP (ref 80–100)
MONOCYTES # BLD AUTO: 0.17 K/UL — SIGNIFICANT CHANGE UP (ref 0–0.9)
MONOCYTES # BLD AUTO: 0.17 K/UL — SIGNIFICANT CHANGE UP (ref 0–0.9)
MONOCYTES NFR BLD AUTO: 7.1 % — SIGNIFICANT CHANGE UP (ref 2–14)
MONOCYTES NFR BLD AUTO: 7.1 % — SIGNIFICANT CHANGE UP (ref 2–14)
NEUTROPHILS # BLD AUTO: 1.06 K/UL — LOW (ref 1.8–7.4)
NEUTROPHILS # BLD AUTO: 1.06 K/UL — LOW (ref 1.8–7.4)
NEUTROPHILS NFR BLD AUTO: 44.7 % — SIGNIFICANT CHANGE UP (ref 43–77)
NEUTROPHILS NFR BLD AUTO: 44.7 % — SIGNIFICANT CHANGE UP (ref 43–77)
NRBC # BLD: 0 /100 WBCS — SIGNIFICANT CHANGE UP (ref 0–0)
NRBC # BLD: 0 /100 WBCS — SIGNIFICANT CHANGE UP (ref 0–0)
NRBC # FLD: 0 K/UL — SIGNIFICANT CHANGE UP (ref 0–0)
NRBC # FLD: 0 K/UL — SIGNIFICANT CHANGE UP (ref 0–0)
PHOSPHATE SERPL-MCNC: 3.2 MG/DL — SIGNIFICANT CHANGE UP (ref 2.5–4.5)
PHOSPHATE SERPL-MCNC: 3.2 MG/DL — SIGNIFICANT CHANGE UP (ref 2.5–4.5)
PLATELET # BLD AUTO: 125 K/UL — LOW (ref 150–400)
PLATELET # BLD AUTO: 125 K/UL — LOW (ref 150–400)
POTASSIUM SERPL-MCNC: 4.1 MMOL/L — SIGNIFICANT CHANGE UP (ref 3.5–5.3)
POTASSIUM SERPL-MCNC: 4.1 MMOL/L — SIGNIFICANT CHANGE UP (ref 3.5–5.3)
POTASSIUM SERPL-SCNC: 4.1 MMOL/L — SIGNIFICANT CHANGE UP (ref 3.5–5.3)
POTASSIUM SERPL-SCNC: 4.1 MMOL/L — SIGNIFICANT CHANGE UP (ref 3.5–5.3)
PROT SERPL-MCNC: 6.6 G/DL — SIGNIFICANT CHANGE UP (ref 6–8.3)
PROT SERPL-MCNC: 6.6 G/DL — SIGNIFICANT CHANGE UP (ref 6–8.3)
RBC # BLD: 3.34 M/UL — LOW (ref 3.8–5.2)
RBC # BLD: 3.34 M/UL — LOW (ref 3.8–5.2)
RBC # FLD: 16.3 % — HIGH (ref 10.3–14.5)
RBC # FLD: 16.3 % — HIGH (ref 10.3–14.5)
SODIUM SERPL-SCNC: 137 MMOL/L — SIGNIFICANT CHANGE UP (ref 135–145)
SODIUM SERPL-SCNC: 137 MMOL/L — SIGNIFICANT CHANGE UP (ref 135–145)
WBC # BLD: 2.38 K/UL — LOW (ref 3.8–10.5)
WBC # BLD: 2.38 K/UL — LOW (ref 3.8–10.5)
WBC # FLD AUTO: 2.38 K/UL — LOW (ref 3.8–10.5)
WBC # FLD AUTO: 2.38 K/UL — LOW (ref 3.8–10.5)

## 2023-12-18 PROCEDURE — 99232 SBSQ HOSP IP/OBS MODERATE 35: CPT | Mod: GC

## 2023-12-18 RX ORDER — MORPHINE SULFATE 50 MG/1
2 CAPSULE, EXTENDED RELEASE ORAL ONCE
Refills: 0 | Status: DISCONTINUED | OUTPATIENT
Start: 2023-12-18 | End: 2023-12-18

## 2023-12-18 RX ORDER — MAGNESIUM SULFATE 500 MG/ML
2 VIAL (ML) INJECTION ONCE
Refills: 0 | Status: COMPLETED | OUTPATIENT
Start: 2023-12-18 | End: 2023-12-18

## 2023-12-18 RX ORDER — LIDOCAINE 4 G/100G
1 CREAM TOPICAL ONCE
Refills: 0 | Status: COMPLETED | OUTPATIENT
Start: 2023-12-18 | End: 2023-12-18

## 2023-12-18 RX ADMIN — ENOXAPARIN SODIUM 40 MILLIGRAM(S): 100 INJECTION SUBCUTANEOUS at 05:59

## 2023-12-18 RX ADMIN — CHLORHEXIDINE GLUCONATE 1 APPLICATION(S): 213 SOLUTION TOPICAL at 12:42

## 2023-12-18 RX ADMIN — LIDOCAINE 1 PATCH: 4 CREAM TOPICAL at 19:30

## 2023-12-18 RX ADMIN — Medication 25 GRAM(S): at 10:23

## 2023-12-18 RX ADMIN — Medication 650 MILLIGRAM(S): at 00:31

## 2023-12-18 RX ADMIN — MORPHINE SULFATE 2 MILLIGRAM(S): 50 CAPSULE, EXTENDED RELEASE ORAL at 15:16

## 2023-12-18 RX ADMIN — MONTELUKAST 10 MILLIGRAM(S): 4 TABLET, CHEWABLE ORAL at 12:41

## 2023-12-18 RX ADMIN — LIDOCAINE 1 PATCH: 4 CREAM TOPICAL at 15:01

## 2023-12-18 RX ADMIN — MORPHINE SULFATE 2 MILLIGRAM(S): 50 CAPSULE, EXTENDED RELEASE ORAL at 15:01

## 2023-12-18 RX ADMIN — Medication 650 MILLIGRAM(S): at 01:31

## 2023-12-18 RX ADMIN — PANTOPRAZOLE SODIUM 40 MILLIGRAM(S): 20 TABLET, DELAYED RELEASE ORAL at 05:59

## 2023-12-18 RX ADMIN — ATORVASTATIN CALCIUM 40 MILLIGRAM(S): 80 TABLET, FILM COATED ORAL at 22:08

## 2023-12-18 RX ADMIN — CEFTRIAXONE 100 MILLIGRAM(S): 500 INJECTION, POWDER, FOR SOLUTION INTRAMUSCULAR; INTRAVENOUS at 12:42

## 2023-12-18 RX ADMIN — ENOXAPARIN SODIUM 40 MILLIGRAM(S): 100 INJECTION SUBCUTANEOUS at 18:19

## 2023-12-18 NOTE — PROGRESS NOTE ADULT - ASSESSMENT
63F hx of DM2, HLD, HTN, breast ca on chemotherapy last chemo on 12/12 following with Dr. Blankenship at [facility] here for pyelonephritis iso pancytopenia after chemo on CTX 63F hx of DM2, HLD, HTN, breast ca on chemotherapy last chemo on 12/12 following with Dr. Blankenship at [facility] here for pyelonephritis iso pancytopenia after chemo on CTX

## 2023-12-18 NOTE — PROGRESS NOTE ADULT - PROBLEM SELECTOR PLAN 2
Patient with pancytopenia after chemo on 12/12. Denies fevers and no signs of systemic symptoms of her infection at this time. ANC count appropriate.  - Continue to monitor CBC, bandemia at this time  - Hold on inpatient chemo while being treated for infection Patient with pancytopenia after chemo on 12/12. Denies fevers and no signs of systemic symptoms of her infection at this time. ANC count lowering at this time. Now mild neutropenia.  - Continue to monitor CBC, bandemia at this time  - Hold on inpatient chemo while being treated for infection

## 2023-12-18 NOTE — PROGRESS NOTE ADULT - PROBLEM SELECTOR PLAN 3
Followed by Dr. Blankenship and on chemo, last dose on 12/12.  - Reach out to Dr. Blankenship at [facility] and acquire chemotherapy records. Patient states she is scheduled for surgery down the line Followed by Dr. Blankenship and on chemo, last dose on 12/12.  - Reach out to Dr. Blankenship at [facility] and acquire chemotherapy records. Patient states she is scheduled for surgery down the line

## 2023-12-18 NOTE — PROGRESS NOTE ADULT - SUBJECTIVE AND OBJECTIVE BOX
PATIENT: JACQUIE GRANDA, MRN: [id number]    CHIEF COMPLAINT: Patient is a 63y old  Female who presents with a chief complaint of pyelonephritis (17 Dec 2023 17:05)      INTERVAL HISTORY/OVERNIGHT EVENTS: No overnight events.       MEDICATIONS:  MEDICATIONS  (STANDING):  atorvastatin 40 milliGRAM(s) Oral at bedtime  cefTRIAXone   IVPB 1000 milliGRAM(s) IV Intermittent every 24 hours  chlorhexidine 2% Cloths 1 Application(s) Topical daily  dextrose 5%. 1000 milliLiter(s) (50 mL/Hr) IV Continuous <Continuous>  dextrose 5%. 1000 milliLiter(s) (100 mL/Hr) IV Continuous <Continuous>  dextrose 50% Injectable 25 Gram(s) IV Push once  dextrose 50% Injectable 25 Gram(s) IV Push once  dextrose 50% Injectable 12.5 Gram(s) IV Push once  enoxaparin Injectable 40 milliGRAM(s) SubCutaneous every 12 hours  glucagon  Injectable 1 milliGRAM(s) IntraMuscular once  influenza   Vaccine 0.5 milliLiter(s) IntraMuscular once  insulin lispro (ADMELOG) corrective regimen sliding scale   SubCutaneous three times a day before meals  insulin lispro (ADMELOG) corrective regimen sliding scale   SubCutaneous at bedtime  losartan 50 milliGRAM(s) Oral daily  montelukast 10 milliGRAM(s) Oral daily  pantoprazole    Tablet 40 milliGRAM(s) Oral before breakfast    MEDICATIONS  (PRN):  acetaminophen     Tablet .. 650 milliGRAM(s) Oral every 6 hours PRN Moderate Pain (4 - 6), Severe Pain (7 - 10)  dextrose Oral Gel 15 Gram(s) Oral once PRN Blood Glucose LESS THAN 70 milliGRAM(s)/deciliter      ALLERGIES: Allergies    amoxicillin (Hives)    Intolerances        OBJECTIVE:  ICU Vital Signs Last 24 Hrs  T(C): 36.7 (18 Dec 2023 05:53), Max: 36.7 (17 Dec 2023 12:19)  T(F): 98.1 (18 Dec 2023 05:53), Max: 98.1 (17 Dec 2023 12:19)  HR: 87 (18 Dec 2023 05:53) (77 - 100)  BP: 94/61 (18 Dec 2023 05:53) (94/61 - 135/78)  BP(mean): --  ABP: --  ABP(mean): --  RR: 18 (18 Dec 2023 05:53) (16 - 20)  SpO2: 100% (18 Dec 2023 05:53) (97% - 100%)    O2 Parameters below as of 18 Dec 2023 05:53  Patient On (Oxygen Delivery Method): room air            CAPILLARY BLOOD GLUCOSE      POCT Blood Glucose.: 110 mg/dL (17 Dec 2023 21:40)  POCT Blood Glucose.: 93 mg/dL (17 Dec 2023 18:00)  POCT Blood Glucose.: 89 mg/dL (17 Dec 2023 16:04)  POCT Blood Glucose.: 121 mg/dL (17 Dec 2023 08:50)    CAPILLARY BLOOD GLUCOSE      POCT Blood Glucose.: 110 mg/dL (17 Dec 2023 21:40)    I&O's Summary    Daily Height in cm: 162.56 (17 Dec 2023 17:37)    Daily     PHYSICAL EXAMINATION:  General: Comfortable, no acute distress, cooperative with exam.  HEENT: PERRLA  Respiratory: CTAB, normal respiratory effort, no coughing, wheezes, crackles, or rales.  CV: RRR, S1S2, no murmurs, rubs or gallops. No JVD. Distal pulses intact.  Abdominal: Soft, nontender, nondistended, no rebound or guarding, normal bowel sounds.  Neurology: AOx3, no focal neuro defects, ROGERS x 4.  Extremities: No pitting edema, + Peripheral pulses.      LABS:                          9.9    2.38  )-----------( 125      ( 18 Dec 2023 06:30 )             30.4     12-    137  |  97<L>  |  24<H>  ----------------------------<  112<H>  4.0   |  26  |  0.89    Ca    10.1      17 Dec 2023 10:20  Phos  2.3     12-  Mg     1.50         TPro  6.7  /  Alb  4.4  /  TBili  1.5<H>  /  DBili  x   /  AST  15  /  ALT  15  /  AlkPhos  128<H>  12-17    LIVER FUNCTIONS - ( 17 Dec 2023 10:20 )  Alb: 4.4 g/dL / Pro: 6.7 g/dL / ALK PHOS: 128 U/L / ALT: 15 U/L / AST: 15 U/L / GGT: x                   Urinalysis Basic - ( 17 Dec 2023 11:30 )    Color: Yellow / Appearance: Clear / S.019 / pH: x  Gluc: x / Ketone: Trace mg/dL  / Bili: Negative / Urobili: 1.0 mg/dL   Blood: x / Protein: 30 mg/dL / Nitrite: Negative   Leuk Esterase: Moderate / RBC: 2 /HPF /  /HPF   Sq Epi: x / Non Sq Epi: 0 /HPF / Bacteria: Occasional /HPF       PATIENT: JACQUIE GRANDA, MRN: [id number]    CHIEF COMPLAINT: Patient is a 63y old  Female who presents with a chief complaint of pyelonephritis (17 Dec 2023 17:05)      INTERVAL HISTORY/OVERNIGHT EVENTS: No overnight events.       MEDICATIONS:  MEDICATIONS  (STANDING):  atorvastatin 40 milliGRAM(s) Oral at bedtime  cefTRIAXone   IVPB 1000 milliGRAM(s) IV Intermittent every 24 hours  chlorhexidine 2% Cloths 1 Application(s) Topical daily  dextrose 5%. 1000 milliLiter(s) (50 mL/Hr) IV Continuous <Continuous>  dextrose 5%. 1000 milliLiter(s) (100 mL/Hr) IV Continuous <Continuous>  dextrose 50% Injectable 25 Gram(s) IV Push once  dextrose 50% Injectable 25 Gram(s) IV Push once  dextrose 50% Injectable 12.5 Gram(s) IV Push once  enoxaparin Injectable 40 milliGRAM(s) SubCutaneous every 12 hours  glucagon  Injectable 1 milliGRAM(s) IntraMuscular once  influenza   Vaccine 0.5 milliLiter(s) IntraMuscular once  insulin lispro (ADMELOG) corrective regimen sliding scale   SubCutaneous three times a day before meals  insulin lispro (ADMELOG) corrective regimen sliding scale   SubCutaneous at bedtime  losartan 50 milliGRAM(s) Oral daily  montelukast 10 milliGRAM(s) Oral daily  pantoprazole    Tablet 40 milliGRAM(s) Oral before breakfast    MEDICATIONS  (PRN):  acetaminophen     Tablet .. 650 milliGRAM(s) Oral every 6 hours PRN Moderate Pain (4 - 6), Severe Pain (7 - 10)  dextrose Oral Gel 15 Gram(s) Oral once PRN Blood Glucose LESS THAN 70 milliGRAM(s)/deciliter      ALLERGIES: Allergies    amoxicillin (Hives)    Intolerances        OBJECTIVE:  ICU Vital Signs Last 24 Hrs  T(C): 36.7 (18 Dec 2023 05:53), Max: 36.7 (17 Dec 2023 12:19)  T(F): 98.1 (18 Dec 2023 05:53), Max: 98.1 (17 Dec 2023 12:19)  HR: 87 (18 Dec 2023 05:53) (77 - 100)  BP: 94/61 (18 Dec 2023 05:53) (94/61 - 135/78)  BP(mean): --  ABP: --  ABP(mean): --  RR: 18 (18 Dec 2023 05:53) (16 - 20)  SpO2: 100% (18 Dec 2023 05:53) (97% - 100%)    O2 Parameters below as of 18 Dec 2023 05:53  Patient On (Oxygen Delivery Method): room air            CAPILLARY BLOOD GLUCOSE      POCT Blood Glucose.: 110 mg/dL (17 Dec 2023 21:40)  POCT Blood Glucose.: 93 mg/dL (17 Dec 2023 18:00)  POCT Blood Glucose.: 89 mg/dL (17 Dec 2023 16:04)  POCT Blood Glucose.: 121 mg/dL (17 Dec 2023 08:50)    CAPILLARY BLOOD GLUCOSE      POCT Blood Glucose.: 110 mg/dL (17 Dec 2023 21:40)    I&O's Summary    Daily Height in cm: 162.56 (17 Dec 2023 17:37)    Daily     PHYSICAL EXAMINATION:  General: Comfortable, no acute distress, cooperative with exam.  HEENT: PERRLA  Respiratory: CTAB, normal respiratory effort, no coughing, wheezes, crackles, or rales.  CV: RRR, S1S2, no murmurs, rubs or gallops. No JVD. Distal pulses intact.  Abdominal: Soft, nontender, nondistended, no rebound or guarding, normal bowel sounds.  Neurology: AOx3, no focal neuro defects, ROGERS x 4.  Extremities: No pitting edema, + Peripheral pulses.      LABS:                          9.9    2.38  )-----------( 125      ( 18 Dec 2023 06:30 )             30.4     12-    137  |  97<L>  |  24<H>  ----------------------------<  112<H>  4.0   |  26  |  0.89    Ca    10.1      17 Dec 2023 10:20  Phos  2.3     12-  Mg     1.50         TPro  6.7  /  Alb  4.4  /  TBili  1.5<H>  /  DBili  x   /  AST  15  /  ALT  15  /  AlkPhos  128<H>  12-17    LIVER FUNCTIONS - ( 17 Dec 2023 10:20 )  Alb: 4.4 g/dL / Pro: 6.7 g/dL / ALK PHOS: 128 U/L / ALT: 15 U/L / AST: 15 U/L / GGT: x                   Urinalysis Basic - ( 17 Dec 2023 11:30 )    Color: Yellow / Appearance: Clear / S.019 / pH: x  Gluc: x / Ketone: Trace mg/dL  / Bili: Negative / Urobili: 1.0 mg/dL   Blood: x / Protein: 30 mg/dL / Nitrite: Negative   Leuk Esterase: Moderate / RBC: 2 /HPF /  /HPF   Sq Epi: x / Non Sq Epi: 0 /HPF / Bacteria: Occasional /HPF       PATIENT: JACQUIE GRANDA, MRN: [id number]    CHIEF COMPLAINT: Patient is a 63y old  Female who presents with a chief complaint of pyelonephritis (17 Dec 2023 17:05)      INTERVAL HISTORY/OVERNIGHT EVENTS: Worsening L CVA tenderness overnight as she had not received morphine or lidocaine patch overnight. She has attempted to eat more, but the taste is stopping her. She is hungry but does not have the taste for things after her chemo. She is also having numbness/tingling in her fingertips of her L hand.      MEDICATIONS:  MEDICATIONS  (STANDING):  atorvastatin 40 milliGRAM(s) Oral at bedtime  cefTRIAXone   IVPB 1000 milliGRAM(s) IV Intermittent every 24 hours  chlorhexidine 2% Cloths 1 Application(s) Topical daily  dextrose 5%. 1000 milliLiter(s) (50 mL/Hr) IV Continuous <Continuous>  dextrose 5%. 1000 milliLiter(s) (100 mL/Hr) IV Continuous <Continuous>  dextrose 50% Injectable 25 Gram(s) IV Push once  dextrose 50% Injectable 25 Gram(s) IV Push once  dextrose 50% Injectable 12.5 Gram(s) IV Push once  enoxaparin Injectable 40 milliGRAM(s) SubCutaneous every 12 hours  glucagon  Injectable 1 milliGRAM(s) IntraMuscular once  influenza   Vaccine 0.5 milliLiter(s) IntraMuscular once  insulin lispro (ADMELOG) corrective regimen sliding scale   SubCutaneous three times a day before meals  insulin lispro (ADMELOG) corrective regimen sliding scale   SubCutaneous at bedtime  losartan 50 milliGRAM(s) Oral daily  montelukast 10 milliGRAM(s) Oral daily  pantoprazole    Tablet 40 milliGRAM(s) Oral before breakfast    MEDICATIONS  (PRN):  acetaminophen     Tablet .. 650 milliGRAM(s) Oral every 6 hours PRN Moderate Pain (4 - 6), Severe Pain (7 - 10)  dextrose Oral Gel 15 Gram(s) Oral once PRN Blood Glucose LESS THAN 70 milliGRAM(s)/deciliter      ALLERGIES: Allergies    amoxicillin (Hives)    Intolerances        OBJECTIVE:  ICU Vital Signs Last 24 Hrs  T(C): 36.7 (18 Dec 2023 05:53), Max: 36.7 (17 Dec 2023 12:19)  T(F): 98.1 (18 Dec 2023 05:53), Max: 98.1 (17 Dec 2023 12:19)  HR: 87 (18 Dec 2023 05:53) (77 - 100)  BP: 94/61 (18 Dec 2023 05:53) (94/61 - 135/78)  BP(mean): --  ABP: --  ABP(mean): --  RR: 18 (18 Dec 2023 05:53) (16 - 20)  SpO2: 100% (18 Dec 2023 05:53) (97% - 100%)    O2 Parameters below as of 18 Dec 2023 05:53  Patient On (Oxygen Delivery Method): room air            CAPILLARY BLOOD GLUCOSE      POCT Blood Glucose.: 110 mg/dL (17 Dec 2023 21:40)  POCT Blood Glucose.: 93 mg/dL (17 Dec 2023 18:00)  POCT Blood Glucose.: 89 mg/dL (17 Dec 2023 16:04)  POCT Blood Glucose.: 121 mg/dL (17 Dec 2023 08:50)    CAPILLARY BLOOD GLUCOSE      POCT Blood Glucose.: 110 mg/dL (17 Dec 2023 21:40)    I&O's Summary    Daily Height in cm: 162.56 (17 Dec 2023 17:37)    Daily     PHYSICAL EXAMINATION:  General: Uncomfortable, in pain, lying on side to avoid lying on back  Respiratory: CTAB, normal respiratory effort, no coughing, wheezes, crackles, or rales.  CV: RRR, S1S2, no murmurs, rubs or gallops.  Abdominal: Soft, nontender, nondistended, no rebound or guarding, normal bowel sounds.  Neurology: AOx3, no focal neuro defects, ROGERS x 4.  Extremities: No pitting edema, + Peripheral pulses.      LABS:                          9.9    2.38  )-----------( 125      ( 18 Dec 2023 06:30 )             30.4     -    137  |  97<L>  |  24<H>  ----------------------------<  112<H>  4.0   |  26  |  0.89    Ca    10.1      17 Dec 2023 10:20  Phos  2.3     12-  Mg     1.50     12-    TPro  6.7  /  Alb  4.4  /  TBili  1.5<H>  /  DBili  x   /  AST  15  /  ALT  15  /  AlkPhos  128<H>  12-17    LIVER FUNCTIONS - ( 17 Dec 2023 10:20 )  Alb: 4.4 g/dL / Pro: 6.7 g/dL / ALK PHOS: 128 U/L / ALT: 15 U/L / AST: 15 U/L / GGT: x                   Urinalysis Basic - ( 17 Dec 2023 11:30 )    Color: Yellow / Appearance: Clear / S.019 / pH: x  Gluc: x / Ketone: Trace mg/dL  / Bili: Negative / Urobili: 1.0 mg/dL   Blood: x / Protein: 30 mg/dL / Nitrite: Negative   Leuk Esterase: Moderate / RBC: 2 /HPF /  /HPF   Sq Epi: x / Non Sq Epi: 0 /HPF / Bacteria: Occasional /HPF       PATIENT: JACQUIE GRANDA, MRN: [id number]    CHIEF COMPLAINT: Patient is a 63y old  Female who presents with a chief complaint of pyelonephritis (17 Dec 2023 17:05)      INTERVAL HISTORY/OVERNIGHT EVENTS: Worsening L CVA tenderness overnight as she had not received morphine or lidocaine patch overnight. She has attempted to eat more, but the taste is stopping her. She is hungry but does not have the taste for things after her chemo. She is also having numbness/tingling in her fingertips of her L hand.      MEDICATIONS:  MEDICATIONS  (STANDING):  atorvastatin 40 milliGRAM(s) Oral at bedtime  cefTRIAXone   IVPB 1000 milliGRAM(s) IV Intermittent every 24 hours  chlorhexidine 2% Cloths 1 Application(s) Topical daily  dextrose 5%. 1000 milliLiter(s) (50 mL/Hr) IV Continuous <Continuous>  dextrose 5%. 1000 milliLiter(s) (100 mL/Hr) IV Continuous <Continuous>  dextrose 50% Injectable 25 Gram(s) IV Push once  dextrose 50% Injectable 25 Gram(s) IV Push once  dextrose 50% Injectable 12.5 Gram(s) IV Push once  enoxaparin Injectable 40 milliGRAM(s) SubCutaneous every 12 hours  glucagon  Injectable 1 milliGRAM(s) IntraMuscular once  influenza   Vaccine 0.5 milliLiter(s) IntraMuscular once  insulin lispro (ADMELOG) corrective regimen sliding scale   SubCutaneous three times a day before meals  insulin lispro (ADMELOG) corrective regimen sliding scale   SubCutaneous at bedtime  losartan 50 milliGRAM(s) Oral daily  montelukast 10 milliGRAM(s) Oral daily  pantoprazole    Tablet 40 milliGRAM(s) Oral before breakfast    MEDICATIONS  (PRN):  acetaminophen     Tablet .. 650 milliGRAM(s) Oral every 6 hours PRN Moderate Pain (4 - 6), Severe Pain (7 - 10)  dextrose Oral Gel 15 Gram(s) Oral once PRN Blood Glucose LESS THAN 70 milliGRAM(s)/deciliter      ALLERGIES: Allergies    amoxicillin (Hives)    Intolerances        OBJECTIVE:  ICU Vital Signs Last 24 Hrs  T(C): 36.7 (18 Dec 2023 05:53), Max: 36.7 (17 Dec 2023 12:19)  T(F): 98.1 (18 Dec 2023 05:53), Max: 98.1 (17 Dec 2023 12:19)  HR: 87 (18 Dec 2023 05:53) (77 - 100)  BP: 94/61 (18 Dec 2023 05:53) (94/61 - 135/78)  BP(mean): --  ABP: --  ABP(mean): --  RR: 18 (18 Dec 2023 05:53) (16 - 20)  SpO2: 100% (18 Dec 2023 05:53) (97% - 100%)    O2 Parameters below as of 18 Dec 2023 05:53  Patient On (Oxygen Delivery Method): room air            CAPILLARY BLOOD GLUCOSE      POCT Blood Glucose.: 110 mg/dL (17 Dec 2023 21:40)  POCT Blood Glucose.: 93 mg/dL (17 Dec 2023 18:00)  POCT Blood Glucose.: 89 mg/dL (17 Dec 2023 16:04)  POCT Blood Glucose.: 121 mg/dL (17 Dec 2023 08:50)    CAPILLARY BLOOD GLUCOSE      POCT Blood Glucose.: 110 mg/dL (17 Dec 2023 21:40)    I&O's Summary    Daily Height in cm: 162.56 (17 Dec 2023 17:37)    Daily     PHYSICAL EXAMINATION:  General: Uncomfortable, in pain, lying on side to avoid lying on back  Respiratory: CTAB, normal respiratory effort, no coughing, wheezes, crackles, or rales.  CV: RRR, S1S2, no murmurs, rubs or gallops.  Abdominal: Soft, nontender, nondistended, no rebound or guarding, normal bowel sounds.  Neurology: AOx3, no focal neuro defects, ROGERS x 4.  Extremities: No pitting edema, + Peripheral pulses.      LABS:                          9.9    2.38  )-----------( 125      ( 18 Dec 2023 06:30 )             30.4     -    137  |  97<L>  |  24<H>  ----------------------------<  112<H>  4.0   |  26  |  0.89    Ca    10.1      17 Dec 2023 10:20  Phos  2.3     12-  Mg     1.50     12-    TPro  6.7  /  Alb  4.4  /  TBili  1.5<H>  /  DBili  x   /  AST  15  /  ALT  15  /  AlkPhos  128<H>  12-17    LIVER FUNCTIONS - ( 17 Dec 2023 10:20 )  Alb: 4.4 g/dL / Pro: 6.7 g/dL / ALK PHOS: 128 U/L / ALT: 15 U/L / AST: 15 U/L / GGT: x                   Urinalysis Basic - ( 17 Dec 2023 11:30 )    Color: Yellow / Appearance: Clear / S.019 / pH: x  Gluc: x / Ketone: Trace mg/dL  / Bili: Negative / Urobili: 1.0 mg/dL   Blood: x / Protein: 30 mg/dL / Nitrite: Negative   Leuk Esterase: Moderate / RBC: 2 /HPF /  /HPF   Sq Epi: x / Non Sq Epi: 0 /HPF / Bacteria: Occasional /HPF       PATIENT: JACQUIE GRANDA, MRN: [id number]    CHIEF COMPLAINT: Patient is a 63y old  Female who presents with a chief complaint of pyelonephritis (17 Dec 2023 17:05)      INTERVAL HISTORY/OVERNIGHT EVENTS: Worsening L CVA tenderness overnight as she had not received morphine or lidocaine patch overnight. She has attempted to eat more, but the taste is stopping her. She is hungry but does not have the taste for things after her chemo. She is also having numbness/tingling in her fingertips of her L hand.      MEDICATIONS:  MEDICATIONS  (STANDING):  atorvastatin 40 milliGRAM(s) Oral at bedtime  cefTRIAXone   IVPB 1000 milliGRAM(s) IV Intermittent every 24 hours  chlorhexidine 2% Cloths 1 Application(s) Topical daily  dextrose 5%. 1000 milliLiter(s) (50 mL/Hr) IV Continuous <Continuous>  dextrose 5%. 1000 milliLiter(s) (100 mL/Hr) IV Continuous <Continuous>  dextrose 50% Injectable 25 Gram(s) IV Push once  dextrose 50% Injectable 25 Gram(s) IV Push once  dextrose 50% Injectable 12.5 Gram(s) IV Push once  enoxaparin Injectable 40 milliGRAM(s) SubCutaneous every 12 hours  glucagon  Injectable 1 milliGRAM(s) IntraMuscular once  influenza   Vaccine 0.5 milliLiter(s) IntraMuscular once  insulin lispro (ADMELOG) corrective regimen sliding scale   SubCutaneous three times a day before meals  insulin lispro (ADMELOG) corrective regimen sliding scale   SubCutaneous at bedtime  losartan 50 milliGRAM(s) Oral daily  montelukast 10 milliGRAM(s) Oral daily  pantoprazole    Tablet 40 milliGRAM(s) Oral before breakfast    MEDICATIONS  (PRN):  acetaminophen     Tablet .. 650 milliGRAM(s) Oral every 6 hours PRN Moderate Pain (4 - 6), Severe Pain (7 - 10)  dextrose Oral Gel 15 Gram(s) Oral once PRN Blood Glucose LESS THAN 70 milliGRAM(s)/deciliter      ALLERGIES: Allergies    amoxicillin (Hives)    Intolerances        OBJECTIVE:  ICU Vital Signs Last 24 Hrs  T(C): 36.7 (18 Dec 2023 05:53), Max: 36.7 (17 Dec 2023 12:19)  T(F): 98.1 (18 Dec 2023 05:53), Max: 98.1 (17 Dec 2023 12:19)  HR: 87 (18 Dec 2023 05:53) (77 - 100)  BP: 94/61 (18 Dec 2023 05:53) (94/61 - 135/78)  BP(mean): --  ABP: --  ABP(mean): --  RR: 18 (18 Dec 2023 05:53) (16 - 20)  SpO2: 100% (18 Dec 2023 05:53) (97% - 100%)    O2 Parameters below as of 18 Dec 2023 05:53  Patient On (Oxygen Delivery Method): room air            CAPILLARY BLOOD GLUCOSE      POCT Blood Glucose.: 110 mg/dL (17 Dec 2023 21:40)  POCT Blood Glucose.: 93 mg/dL (17 Dec 2023 18:00)  POCT Blood Glucose.: 89 mg/dL (17 Dec 2023 16:04)  POCT Blood Glucose.: 121 mg/dL (17 Dec 2023 08:50)    CAPILLARY BLOOD GLUCOSE      POCT Blood Glucose.: 110 mg/dL (17 Dec 2023 21:40)    I&O's Summary    Daily Height in cm: 162.56 (17 Dec 2023 17:37)    Daily     PHYSICAL EXAMINATION:  General: Uncomfortable, in pain, lying on side to avoid lying on back  Respiratory: CTAB, normal respiratory effort, no coughing, wheezes, crackles, or rales.  CV: RRR, S1S2, no murmurs, rubs or gallops.  Abdominal: Soft, nontender, nondistended, no rebound or guarding, normal bowel sounds.  Neurology: no focal neuro defects, ROGERS x 4.  Extremities: No pitting edema  Skin: L CVA tenderness, no R CVA tenderness, no spinal tenderness      LABS:                          9.9    2.38  )-----------( 125      ( 18 Dec 2023 06:30 )             30.4     12-17    137  |  97<L>  |  24<H>  ----------------------------<  112<H>  4.0   |  26  |  0.89    Ca    10.1      17 Dec 2023 10:20  Phos  2.3     12-17  Mg     1.50     12-17    TPro  6.7  /  Alb  4.4  /  TBili  1.5<H>  /  DBili  x   /  AST  15  /  ALT  15  /  AlkPhos  128<H>  12-17    LIVER FUNCTIONS - ( 17 Dec 2023 10:20 )  Alb: 4.4 g/dL / Pro: 6.7 g/dL / ALK PHOS: 128 U/L / ALT: 15 U/L / AST: 15 U/L / GGT: x                   Urinalysis Basic - ( 17 Dec 2023 11:30 )    Color: Yellow / Appearance: Clear / S.019 / pH: x  Gluc: x / Ketone: Trace mg/dL  / Bili: Negative / Urobili: 1.0 mg/dL   Blood: x / Protein: 30 mg/dL / Nitrite: Negative   Leuk Esterase: Moderate / RBC: 2 /HPF /  /HPF   Sq Epi: x / Non Sq Epi: 0 /HPF / Bacteria: Occasional /HPF       PATIENT: JACQUIE GRANDA, MRN: [id number]    CHIEF COMPLAINT: Patient is a 63y old  Female who presents with a chief complaint of pyelonephritis (17 Dec 2023 17:05)      INTERVAL HISTORY/OVERNIGHT EVENTS: Worsening L CVA tenderness overnight as she had not received morphine or lidocaine patch overnight. She has attempted to eat more, but the taste is stopping her. She is hungry but does not have the taste for things after her chemo. She is also having numbness/tingling in her fingertips of her L hand.      MEDICATIONS:  MEDICATIONS  (STANDING):  atorvastatin 40 milliGRAM(s) Oral at bedtime  cefTRIAXone   IVPB 1000 milliGRAM(s) IV Intermittent every 24 hours  chlorhexidine 2% Cloths 1 Application(s) Topical daily  dextrose 5%. 1000 milliLiter(s) (50 mL/Hr) IV Continuous <Continuous>  dextrose 5%. 1000 milliLiter(s) (100 mL/Hr) IV Continuous <Continuous>  dextrose 50% Injectable 25 Gram(s) IV Push once  dextrose 50% Injectable 25 Gram(s) IV Push once  dextrose 50% Injectable 12.5 Gram(s) IV Push once  enoxaparin Injectable 40 milliGRAM(s) SubCutaneous every 12 hours  glucagon  Injectable 1 milliGRAM(s) IntraMuscular once  influenza   Vaccine 0.5 milliLiter(s) IntraMuscular once  insulin lispro (ADMELOG) corrective regimen sliding scale   SubCutaneous three times a day before meals  insulin lispro (ADMELOG) corrective regimen sliding scale   SubCutaneous at bedtime  losartan 50 milliGRAM(s) Oral daily  montelukast 10 milliGRAM(s) Oral daily  pantoprazole    Tablet 40 milliGRAM(s) Oral before breakfast    MEDICATIONS  (PRN):  acetaminophen     Tablet .. 650 milliGRAM(s) Oral every 6 hours PRN Moderate Pain (4 - 6), Severe Pain (7 - 10)  dextrose Oral Gel 15 Gram(s) Oral once PRN Blood Glucose LESS THAN 70 milliGRAM(s)/deciliter      ALLERGIES: Allergies    amoxicillin (Hives)    Intolerances        OBJECTIVE:  ICU Vital Signs Last 24 Hrs  T(C): 36.7 (18 Dec 2023 05:53), Max: 36.7 (17 Dec 2023 12:19)  T(F): 98.1 (18 Dec 2023 05:53), Max: 98.1 (17 Dec 2023 12:19)  HR: 87 (18 Dec 2023 05:53) (77 - 100)  BP: 94/61 (18 Dec 2023 05:53) (94/61 - 135/78)  BP(mean): --  ABP: --  ABP(mean): --  RR: 18 (18 Dec 2023 05:53) (16 - 20)  SpO2: 100% (18 Dec 2023 05:53) (97% - 100%)    O2 Parameters below as of 18 Dec 2023 05:53  Patient On (Oxygen Delivery Method): room air            CAPILLARY BLOOD GLUCOSE      POCT Blood Glucose.: 110 mg/dL (17 Dec 2023 21:40)  POCT Blood Glucose.: 93 mg/dL (17 Dec 2023 18:00)  POCT Blood Glucose.: 89 mg/dL (17 Dec 2023 16:04)  POCT Blood Glucose.: 121 mg/dL (17 Dec 2023 08:50)    CAPILLARY BLOOD GLUCOSE      POCT Blood Glucose.: 110 mg/dL (17 Dec 2023 21:40)    I&O's Summary    Daily Height in cm: 162.56 (17 Dec 2023 17:37)    Daily     PHYSICAL EXAMINATION:  General: Uncomfortable, in pain, lying on side to avoid lying on back  Respiratory: CTAB, normal respiratory effort, no coughing, wheezes, crackles, or rales.  CV: RRR, S1S2, no murmurs, rubs or gallops.  Abdominal: Soft, nontender, nondistended, no rebound or guarding, normal bowel sounds.  Neurology: no focal neuro defects, ROGERS x 4.  Extremities: No pitting edema  Skin: L CVA tenderness, no R CVA tenderness, no spinal tenderness      LABS:                          9.9    2.38  )-----------( 125      ( 18 Dec 2023 06:30 )             30.4     12-17    137  |  97<L>  |  24<H>  ----------------------------<  112<H>  4.0   |  26  |  0.89    Ca    10.1      17 Dec 2023 10:20  Phos  2.3     12-17  Mg     1.50     12-17    TPro  6.7  /  Alb  4.4  /  TBili  1.5<H>  /  DBili  x   /  AST  15  /  ALT  15  /  AlkPhos  128<H>  12-17    LIVER FUNCTIONS - ( 17 Dec 2023 10:20 )  Alb: 4.4 g/dL / Pro: 6.7 g/dL / ALK PHOS: 128 U/L / ALT: 15 U/L / AST: 15 U/L / GGT: x                   Urinalysis Basic - ( 17 Dec 2023 11:30 )    Color: Yellow / Appearance: Clear / S.019 / pH: x  Gluc: x / Ketone: Trace mg/dL  / Bili: Negative / Urobili: 1.0 mg/dL   Blood: x / Protein: 30 mg/dL / Nitrite: Negative   Leuk Esterase: Moderate / RBC: 2 /HPF /  /HPF   Sq Epi: x / Non Sq Epi: 0 /HPF / Bacteria: Occasional /HPF

## 2023-12-18 NOTE — PROGRESS NOTE ADULT - PROBLEM SELECTOR PLAN 4
DM2, has been off insulin for a month. Prescribed 15U Lantus at home, but does not use because her BGs are 80s-90s.  - LDSSI DM2, has been off insulin for a month. Prescribed 15U Lantus at home, but does not use because her BGs are 80s-90s.  - LDSSI  - A1C 5.8

## 2023-12-18 NOTE — PROGRESS NOTE ADULT - PROBLEM SELECTOR PLAN 1
UA with moderate LE and bacteria. L CVA tenderness and urinary symptoms. Although, CT Abdomen Pelvis unrevealing. Patient was started on CTX, given fluids, and pain regimen, and has been improving. Admitted due to pancytopenia iso pancytopenia 2/2 chemo  - Hold on maintenance fluids at this time, cap refill normal  - Continue CTX for total of 5-7 days, transition to oral when urine culture sensitivities result  - F/u bcx UA with moderate LE and bacteria. L CVA tenderness and urinary symptoms. Although, CT Abdomen Pelvis unrevealing. Patient was started on CTX, given fluids, and pain regimen, and has been improving. Admitted due to pancytopenia iso chemo  - Hold on maintenance fluids at this time, cap refill normal  - Continue CTX for total of 5-7 days, transition to oral when urine culture sensitivities result  - F/u bcx

## 2023-12-19 ENCOUNTER — TRANSCRIPTION ENCOUNTER (OUTPATIENT)
Age: 63
End: 2023-12-19

## 2023-12-19 DIAGNOSIS — K52.9 NONINFECTIVE GASTROENTERITIS AND COLITIS, UNSPECIFIED: ICD-10-CM

## 2023-12-19 LAB
ALBUMIN SERPL ELPH-MCNC: 4.1 G/DL — SIGNIFICANT CHANGE UP (ref 3.3–5)
ALBUMIN SERPL ELPH-MCNC: 4.1 G/DL — SIGNIFICANT CHANGE UP (ref 3.3–5)
ALP SERPL-CCNC: 120 U/L — SIGNIFICANT CHANGE UP (ref 40–120)
ALP SERPL-CCNC: 120 U/L — SIGNIFICANT CHANGE UP (ref 40–120)
ALT FLD-CCNC: 15 U/L — SIGNIFICANT CHANGE UP (ref 4–33)
ALT FLD-CCNC: 15 U/L — SIGNIFICANT CHANGE UP (ref 4–33)
ANION GAP SERPL CALC-SCNC: 10 MMOL/L — SIGNIFICANT CHANGE UP (ref 7–14)
ANION GAP SERPL CALC-SCNC: 10 MMOL/L — SIGNIFICANT CHANGE UP (ref 7–14)
AST SERPL-CCNC: 15 U/L — SIGNIFICANT CHANGE UP (ref 4–32)
AST SERPL-CCNC: 15 U/L — SIGNIFICANT CHANGE UP (ref 4–32)
BASOPHILS # BLD AUTO: 0.03 K/UL — SIGNIFICANT CHANGE UP (ref 0–0.2)
BASOPHILS # BLD AUTO: 0.03 K/UL — SIGNIFICANT CHANGE UP (ref 0–0.2)
BASOPHILS NFR BLD AUTO: 0.9 % — SIGNIFICANT CHANGE UP (ref 0–2)
BASOPHILS NFR BLD AUTO: 0.9 % — SIGNIFICANT CHANGE UP (ref 0–2)
BILIRUB SERPL-MCNC: 0.6 MG/DL — SIGNIFICANT CHANGE UP (ref 0.2–1.2)
BILIRUB SERPL-MCNC: 0.6 MG/DL — SIGNIFICANT CHANGE UP (ref 0.2–1.2)
BUN SERPL-MCNC: 12 MG/DL — SIGNIFICANT CHANGE UP (ref 7–23)
BUN SERPL-MCNC: 12 MG/DL — SIGNIFICANT CHANGE UP (ref 7–23)
CALCIUM SERPL-MCNC: 9.7 MG/DL — SIGNIFICANT CHANGE UP (ref 8.4–10.5)
CALCIUM SERPL-MCNC: 9.7 MG/DL — SIGNIFICANT CHANGE UP (ref 8.4–10.5)
CHLORIDE SERPL-SCNC: 102 MMOL/L — SIGNIFICANT CHANGE UP (ref 98–107)
CHLORIDE SERPL-SCNC: 102 MMOL/L — SIGNIFICANT CHANGE UP (ref 98–107)
CO2 SERPL-SCNC: 27 MMOL/L — SIGNIFICANT CHANGE UP (ref 22–31)
CO2 SERPL-SCNC: 27 MMOL/L — SIGNIFICANT CHANGE UP (ref 22–31)
CREAT SERPL-MCNC: 0.81 MG/DL — SIGNIFICANT CHANGE UP (ref 0.5–1.3)
CREAT SERPL-MCNC: 0.81 MG/DL — SIGNIFICANT CHANGE UP (ref 0.5–1.3)
EGFR: 82 ML/MIN/1.73M2 — SIGNIFICANT CHANGE UP
EGFR: 82 ML/MIN/1.73M2 — SIGNIFICANT CHANGE UP
EOSINOPHIL # BLD AUTO: 0.01 K/UL — SIGNIFICANT CHANGE UP (ref 0–0.5)
EOSINOPHIL # BLD AUTO: 0.01 K/UL — SIGNIFICANT CHANGE UP (ref 0–0.5)
EOSINOPHIL NFR BLD AUTO: 0.3 % — SIGNIFICANT CHANGE UP (ref 0–6)
EOSINOPHIL NFR BLD AUTO: 0.3 % — SIGNIFICANT CHANGE UP (ref 0–6)
GI PCR PANEL: SIGNIFICANT CHANGE UP
GI PCR PANEL: SIGNIFICANT CHANGE UP
GLUCOSE BLDC GLUCOMTR-MCNC: 83 MG/DL — SIGNIFICANT CHANGE UP (ref 70–99)
GLUCOSE BLDC GLUCOMTR-MCNC: 83 MG/DL — SIGNIFICANT CHANGE UP (ref 70–99)
GLUCOSE BLDC GLUCOMTR-MCNC: 89 MG/DL — SIGNIFICANT CHANGE UP (ref 70–99)
GLUCOSE BLDC GLUCOMTR-MCNC: 89 MG/DL — SIGNIFICANT CHANGE UP (ref 70–99)
GLUCOSE BLDC GLUCOMTR-MCNC: 92 MG/DL — SIGNIFICANT CHANGE UP (ref 70–99)
GLUCOSE BLDC GLUCOMTR-MCNC: 92 MG/DL — SIGNIFICANT CHANGE UP (ref 70–99)
GLUCOSE BLDC GLUCOMTR-MCNC: 94 MG/DL — SIGNIFICANT CHANGE UP (ref 70–99)
GLUCOSE BLDC GLUCOMTR-MCNC: 94 MG/DL — SIGNIFICANT CHANGE UP (ref 70–99)
GLUCOSE SERPL-MCNC: 100 MG/DL — HIGH (ref 70–99)
GLUCOSE SERPL-MCNC: 100 MG/DL — HIGH (ref 70–99)
HCT VFR BLD CALC: 30.2 % — LOW (ref 34.5–45)
HCT VFR BLD CALC: 30.2 % — LOW (ref 34.5–45)
HGB BLD-MCNC: 9.5 G/DL — LOW (ref 11.5–15.5)
HGB BLD-MCNC: 9.5 G/DL — LOW (ref 11.5–15.5)
IANC: 1.15 K/UL — LOW (ref 1.8–7.4)
IANC: 1.15 K/UL — LOW (ref 1.8–7.4)
IMM GRANULOCYTES NFR BLD AUTO: 0.6 % — SIGNIFICANT CHANGE UP (ref 0–0.9)
IMM GRANULOCYTES NFR BLD AUTO: 0.6 % — SIGNIFICANT CHANGE UP (ref 0–0.9)
LYMPHOCYTES # BLD AUTO: 1.53 K/UL — SIGNIFICANT CHANGE UP (ref 1–3.3)
LYMPHOCYTES # BLD AUTO: 1.53 K/UL — SIGNIFICANT CHANGE UP (ref 1–3.3)
LYMPHOCYTES # BLD AUTO: 47.8 % — HIGH (ref 13–44)
LYMPHOCYTES # BLD AUTO: 47.8 % — HIGH (ref 13–44)
MAGNESIUM SERPL-MCNC: 1.7 MG/DL — SIGNIFICANT CHANGE UP (ref 1.6–2.6)
MAGNESIUM SERPL-MCNC: 1.7 MG/DL — SIGNIFICANT CHANGE UP (ref 1.6–2.6)
MCHC RBC-ENTMCNC: 29.5 PG — SIGNIFICANT CHANGE UP (ref 27–34)
MCHC RBC-ENTMCNC: 29.5 PG — SIGNIFICANT CHANGE UP (ref 27–34)
MCHC RBC-ENTMCNC: 31.5 GM/DL — LOW (ref 32–36)
MCHC RBC-ENTMCNC: 31.5 GM/DL — LOW (ref 32–36)
MCV RBC AUTO: 93.8 FL — SIGNIFICANT CHANGE UP (ref 80–100)
MCV RBC AUTO: 93.8 FL — SIGNIFICANT CHANGE UP (ref 80–100)
MONOCYTES # BLD AUTO: 0.46 K/UL — SIGNIFICANT CHANGE UP (ref 0–0.9)
MONOCYTES # BLD AUTO: 0.46 K/UL — SIGNIFICANT CHANGE UP (ref 0–0.9)
MONOCYTES NFR BLD AUTO: 14.4 % — HIGH (ref 2–14)
MONOCYTES NFR BLD AUTO: 14.4 % — HIGH (ref 2–14)
NEUTROPHILS # BLD AUTO: 1.15 K/UL — LOW (ref 1.8–7.4)
NEUTROPHILS # BLD AUTO: 1.15 K/UL — LOW (ref 1.8–7.4)
NEUTROPHILS NFR BLD AUTO: 36 % — LOW (ref 43–77)
NEUTROPHILS NFR BLD AUTO: 36 % — LOW (ref 43–77)
NRBC # BLD: 0 /100 WBCS — SIGNIFICANT CHANGE UP (ref 0–0)
NRBC # BLD: 0 /100 WBCS — SIGNIFICANT CHANGE UP (ref 0–0)
NRBC # FLD: 0 K/UL — SIGNIFICANT CHANGE UP (ref 0–0)
NRBC # FLD: 0 K/UL — SIGNIFICANT CHANGE UP (ref 0–0)
PHOSPHATE SERPL-MCNC: 3.2 MG/DL — SIGNIFICANT CHANGE UP (ref 2.5–4.5)
PHOSPHATE SERPL-MCNC: 3.2 MG/DL — SIGNIFICANT CHANGE UP (ref 2.5–4.5)
PLATELET # BLD AUTO: 120 K/UL — LOW (ref 150–400)
PLATELET # BLD AUTO: 120 K/UL — LOW (ref 150–400)
POTASSIUM SERPL-MCNC: 4.4 MMOL/L — SIGNIFICANT CHANGE UP (ref 3.5–5.3)
POTASSIUM SERPL-MCNC: 4.4 MMOL/L — SIGNIFICANT CHANGE UP (ref 3.5–5.3)
POTASSIUM SERPL-SCNC: 4.4 MMOL/L — SIGNIFICANT CHANGE UP (ref 3.5–5.3)
POTASSIUM SERPL-SCNC: 4.4 MMOL/L — SIGNIFICANT CHANGE UP (ref 3.5–5.3)
PROT SERPL-MCNC: 6.7 G/DL — SIGNIFICANT CHANGE UP (ref 6–8.3)
PROT SERPL-MCNC: 6.7 G/DL — SIGNIFICANT CHANGE UP (ref 6–8.3)
RBC # BLD: 3.22 M/UL — LOW (ref 3.8–5.2)
RBC # BLD: 3.22 M/UL — LOW (ref 3.8–5.2)
RBC # FLD: 16 % — HIGH (ref 10.3–14.5)
RBC # FLD: 16 % — HIGH (ref 10.3–14.5)
SODIUM SERPL-SCNC: 139 MMOL/L — SIGNIFICANT CHANGE UP (ref 135–145)
SODIUM SERPL-SCNC: 139 MMOL/L — SIGNIFICANT CHANGE UP (ref 135–145)
WBC # BLD: 3.1 K/UL — LOW (ref 3.8–10.5)
WBC # BLD: 3.1 K/UL — LOW (ref 3.8–10.5)
WBC # FLD AUTO: 3.1 K/UL — LOW (ref 3.8–10.5)
WBC # FLD AUTO: 3.1 K/UL — LOW (ref 3.8–10.5)

## 2023-12-19 PROCEDURE — 99232 SBSQ HOSP IP/OBS MODERATE 35: CPT | Mod: GC

## 2023-12-19 RX ORDER — SODIUM CHLORIDE 9 MG/ML
1000 INJECTION, SOLUTION INTRAVENOUS
Refills: 0 | Status: DISCONTINUED | OUTPATIENT
Start: 2023-12-19 | End: 2023-12-20

## 2023-12-19 RX ORDER — LIDOCAINE 4 G/100G
1 CREAM TOPICAL DAILY
Refills: 0 | Status: DISCONTINUED | OUTPATIENT
Start: 2023-12-19 | End: 2023-12-20

## 2023-12-19 RX ORDER — MORPHINE SULFATE 50 MG/1
2 CAPSULE, EXTENDED RELEASE ORAL ONCE
Refills: 0 | Status: DISCONTINUED | OUTPATIENT
Start: 2023-12-19 | End: 2023-12-19

## 2023-12-19 RX ADMIN — SODIUM CHLORIDE 100 MILLILITER(S): 9 INJECTION, SOLUTION INTRAVENOUS at 17:42

## 2023-12-19 RX ADMIN — MORPHINE SULFATE 2 MILLIGRAM(S): 50 CAPSULE, EXTENDED RELEASE ORAL at 21:55

## 2023-12-19 RX ADMIN — LIDOCAINE 1 PATCH: 4 CREAM TOPICAL at 15:25

## 2023-12-19 RX ADMIN — Medication 650 MILLIGRAM(S): at 16:25

## 2023-12-19 RX ADMIN — CEFTRIAXONE 100 MILLIGRAM(S): 500 INJECTION, POWDER, FOR SOLUTION INTRAMUSCULAR; INTRAVENOUS at 13:28

## 2023-12-19 RX ADMIN — PANTOPRAZOLE SODIUM 40 MILLIGRAM(S): 20 TABLET, DELAYED RELEASE ORAL at 05:44

## 2023-12-19 RX ADMIN — MONTELUKAST 10 MILLIGRAM(S): 4 TABLET, CHEWABLE ORAL at 13:28

## 2023-12-19 RX ADMIN — Medication 650 MILLIGRAM(S): at 15:25

## 2023-12-19 RX ADMIN — ATORVASTATIN CALCIUM 40 MILLIGRAM(S): 80 TABLET, FILM COATED ORAL at 21:42

## 2023-12-19 RX ADMIN — LIDOCAINE 1 PATCH: 4 CREAM TOPICAL at 03:00

## 2023-12-19 RX ADMIN — ENOXAPARIN SODIUM 40 MILLIGRAM(S): 100 INJECTION SUBCUTANEOUS at 05:44

## 2023-12-19 RX ADMIN — ENOXAPARIN SODIUM 40 MILLIGRAM(S): 100 INJECTION SUBCUTANEOUS at 17:42

## 2023-12-19 RX ADMIN — LIDOCAINE 1 PATCH: 4 CREAM TOPICAL at 19:29

## 2023-12-19 RX ADMIN — CHLORHEXIDINE GLUCONATE 1 APPLICATION(S): 213 SOLUTION TOPICAL at 13:28

## 2023-12-19 RX ADMIN — LOSARTAN POTASSIUM 50 MILLIGRAM(S): 100 TABLET, FILM COATED ORAL at 05:44

## 2023-12-19 RX ADMIN — MORPHINE SULFATE 2 MILLIGRAM(S): 50 CAPSULE, EXTENDED RELEASE ORAL at 21:40

## 2023-12-19 NOTE — PROGRESS NOTE ADULT - PROBLEM SELECTOR PLAN 2
[General Appearance - Well Developed] : well developed [Normal Appearance] : normal appearance [Well Groomed] : well groomed [No Deformities] : no deformities [General Appearance - Well Nourished] : well nourished [General Appearance - In No Acute Distress] : no acute distress [Normal Oral Mucosa] : normal oral mucosa [Normal Conjunctiva] : the conjunctiva exhibited no abnormalities [Eyelids - No Xanthelasma] : the eyelids demonstrated no xanthelasmas [No Oral Cyanosis] : no oral cyanosis [No Oral Pallor] : no oral pallor [Normal Jugular Venous A Waves Present] : normal jugular venous A waves present [No Jugular Venous Oleary A Waves] : no jugular venous oleary A waves [Normal Jugular Venous V Waves Present] : normal jugular venous V waves present [Respiration, Rhythm And Depth] : normal respiratory rhythm and effort [Exaggerated Use Of Accessory Muscles For Inspiration] : no accessory muscle use [Auscultation Breath Sounds / Voice Sounds] : lungs were clear to auscultation bilaterally [Heart Sounds] : normal S1 and S2 [Heart Rate And Rhythm] : heart rate and rhythm were normal [Murmurs] : no murmurs present Patient with pancytopenia after chemo on 12/12. Denies fevers and no signs of systemic symptoms of her infection at this time. ANC count lowering at this time. Now mild neutropenia.  - Continue to monitor CBC, bandemia at this time  - Hold on inpatient chemo while being treated for infection [Gait - Sufficient For Exercise Testing] : the gait was sufficient for exercise testing [Abnormal Walk] : normal gait [FreeTextEntry1] : MILD L LEG EDEMA [Skin Color & Pigmentation] : normal skin color and pigmentation [No Venous Stasis] : no venous stasis [] : no rash [No Skin Ulcers] : no skin ulcer [Skin Lesions] : no skin lesions [Oriented To Time, Place, And Person] : oriented to person, place, and time [No Xanthoma] : no  xanthoma was observed [No Anxiety] : not feeling anxious [Affect] : the affect was normal [Mood] : the mood was normal Likely 2/2 antibiotics, less likely C Diff no WBC count no fever  - F/u GI PCR, F/u C Diff

## 2023-12-19 NOTE — PROGRESS NOTE ADULT - PROBLEM SELECTOR PLAN 1
UA with moderate LE and bacteria. L CVA tenderness and urinary symptoms. Although, CT Abdomen Pelvis unrevealing. Patient was started on CTX, given fluids, and pain regimen, and has been improving. Admitted due to pancytopenia iso chemo  - Hold on maintenance fluids at this time, cap refill normal  - Continue CTX for total of 5-7 days, transition to oral when urine culture sensitivities result  - F/u bcx

## 2023-12-19 NOTE — PROGRESS NOTE ADULT - PROBLEM SELECTOR PLAN 3
Followed by Dr. Blankenship and on chemo, last dose on 12/12.  - Reach out to Dr. Blankenship at [facility] and acquire chemotherapy records. Patient states she is scheduled for surgery down the line Followed by Dr. Blankenship and on chemo, last dose on 12/12.  - Reach out to Dr. Blankenship at [facility] and acquire chemotherapy records. Patient states she is scheduled for surgery down the line Patient with pancytopenia after chemo on 12/12. Denies fevers and no signs of systemic symptoms of her infection at this time. ANC count lowering at this time. Now mild neutropenia.  - Continue to monitor CBC, bandemia at this time. Stable  - Hold on inpatient chemo while being treated for infection

## 2023-12-19 NOTE — PROGRESS NOTE ADULT - PROBLEM SELECTOR PLAN 4
DM2, has been off insulin for a month. Prescribed 15U Lantus at home, but does not use because her BGs are 80s-90s.  - LDSSI  - A1C 5.8 Followed by Dr. Blankenship and on chemo, last dose on 12/12.  - Patient on neoadjuvant chemo at this time with plan for lumpectomy with Dr. Blankenship.

## 2023-12-19 NOTE — DISCHARGE NOTE PROVIDER - NSDCFUADDINST_GEN_ALL_CORE_FT
Important Medication Changes and Reason:    Please take your Bactrim for 14 total days, ending on 1/3/2024.    Active or Pending Issues Requiring Follow-up:    Please follow-up with your PCP in 1-2 weeks  Please follow-up with your oncologist in 1-2 weeks  Mediport not accessed during this admission, please follow up with your oncologist Dr. Blankenship for further care.

## 2023-12-19 NOTE — DISCHARGE NOTE PROVIDER - HOSPITAL COURSE
HPI:  63F hx of breast cancer on chemo with Dr. Blankenship at [facility], DM2, HLD, HTN here for dysuria and urinary urgency with flank pain concerning for pyelonephritis.    Symptoms began 3 days prior, last chemotherapy session on 12/12. Patient began having significant polyuria, to the point she was worried about incontinence, urgency, dysuria (stinging but not burning per patient). She has not had a hx of kidney stones and she has denies hematuria. However, she has had reduced PO intake over the past several days. She complains of nausea, but denies vomiting.    Patient denies a history of UTIs, BMs are regular, and she denies chest pain, shortness of breath/wheezing.    Upon arrival to the ED, she was given 1L NS and started on IV CTX. Patient had flank pain, worse on the L side and was given lidocaine patch and IV morphine. Patient's pain has mostly dissipated. CT Chest Abdomen Pelvis without any acute findings. (17 Dec 2023 17:05)    Hospital Course: Patient was hospitalized with pyelonephritis iso pancytopenia 2/2 recent chemo. Patient on Neulasta outpatient, and did have mild neutropenia. However, patient was afebrile during stay, and was on CTX. Urine cultures grew GNRs which eventually speciated to ____, and patient was transitioned to ___. Pain was managed with lidocaine patches, morphine, and tylenol. Pancytopenia remained stable during admission. Course was complicated by watery diarrhea, although patient never developed a rising WBC count or fever. GI PCR and C Diff Panel showed ***.    Important Medication Changes and Reason:    Please take your ____ for 7 total days, ending on 12/23.    Active or Pending Issues Requiring Follow-up:    Please follow-up with your PCP in 1-2 weeks  Please follow-up with your oncologist in 1-2 weeks         HPI:  63F hx of breast cancer on chemo with Dr. Blankenship at [facility], DM2, HLD, HTN here for dysuria and urinary urgency with flank pain concerning for pyelonephritis.    Symptoms began 3 days prior, last chemotherapy session on 12/12. Patient began having significant polyuria, to the point she was worried about incontinence, urgency, dysuria (stinging but not burning per patient). She has not had a hx of kidney stones and she has denies hematuria. However, she has had reduced PO intake over the past several days. She complains of nausea, but denies vomiting.    Patient denies a history of UTIs, BMs are regular, and she denies chest pain, shortness of breath/wheezing.    Upon arrival to the ED, she was given 1L NS and started on IV CTX. Patient had flank pain, worse on the L side and was given lidocaine patch and IV morphine. Patient's pain has mostly dissipated. CT Chest Abdomen Pelvis without any acute findings. (17 Dec 2023 17:05)    Hospital Course: Patient was hospitalized with pyelonephritis iso pancytopenia 2/2 recent chemo. Patient on Neulasta outpatient, and did have mild neutropenia. However, patient was afebrile during stay, and was on CTX. Urine cultures grew GNRs which eventually speciated to ____, and patient was transitioned to ___. Pain was managed with lidocaine patches, morphine, and tylenol. Pancytopenia remained stable during admission. Course was complicated by watery diarrhea, although patient never developed a rising WBC count or fever. GI PCR and C Diff Panel showed ***.    Important Medication Changes and Reason:    Please take your ____ for 7 total days, ending on 12/23.    Active or Pending Issues Requiring Follow-up:    Please follow-up with your PCP in 1-2 weeks  Please follow-up with your oncologist in 1-2 weeks         HPI:  63F hx of breast cancer on chemo with Dr. Blankenship at [facility], DM2, HLD, HTN here for dysuria and urinary urgency with flank pain concerning for pyelonephritis.    Symptoms began 3 days prior, last chemotherapy session on 12/12. Patient began having significant polyuria, to the point she was worried about incontinence, urgency, dysuria (stinging but not burning per patient). She has not had a hx of kidney stones and she has denies hematuria. However, she has had reduced PO intake over the past several days. She complains of nausea, but denies vomiting.    Patient denies a history of UTIs, BMs are regular, and she denies chest pain, shortness of breath/wheezing.    Upon arrival to the ED, she was given 1L NS and started on IV CTX. Patient had flank pain, worse on the L side and was given lidocaine patch and IV morphine. Patient's pain has mostly dissipated. CT Chest Abdomen Pelvis without any acute findings. (17 Dec 2023 17:05)    Hospital Course: Patient was hospitalized with pyelonephritis iso pancytopenia 2/2 recent chemo. Patient on Neulasta outpatient, and did have mild neutropenia. However, patient was afebrile during stay, and was on CTX. Urine cultures grew GNRs which eventually speciated to Citrobacter freundii, and patient was transitioned to ___. Pain was managed with lidocaine patches, morphine, and tylenol. Pancytopenia remained stable during admission. Course was complicated by watery diarrhea 2/2 antibiotics, although patient never developed a rising WBC count or fever. GI PCR and C Diff Panel were both negative.    Important Medication Changes and Reason:    Please take your ____ for 7 total days, ending on 12/23.    Active or Pending Issues Requiring Follow-up:    Please follow-up with your PCP in 1-2 weeks  Please follow-up with your oncologist in 1-2 weeks         HPI:  63F hx of breast cancer on chemo with Dr. Blankenship at [facility], DM2, HLD, HTN here for dysuria and urinary urgency with flank pain concerning for pyelonephritis.    Symptoms began 3 days prior, last chemotherapy session on 12/12. Patient began having significant polyuria, to the point she was worried about incontinence, urgency, dysuria (stinging but not burning per patient). She has not had a hx of kidney stones and she has denies hematuria. However, she has had reduced PO intake over the past several days. She complains of nausea, but denies vomiting.    Patient denies a history of UTIs, BMs are regular, and she denies chest pain, shortness of breath/wheezing.    Upon arrival to the ED, she was given 1L NS and started on IV CTX. Patient had flank pain, worse on the L side and was given lidocaine patch and IV morphine. Patient's pain has mostly dissipated. CT Chest Abdomen Pelvis without any acute findings. (17 Dec 2023 17:05)    Hospital Course: Patient was hospitalized with pyelonephritis iso pancytopenia 2/2 recent chemo. Patient on Neulasta outpatient, and did have mild neutropenia. However, patient was afebrile during stay, and was on CTX. Urine cultures grew GNRs which eventually speciated to Citrobacter freundii, and patient was transitioned to ___. Pain was managed with lidocaine patches, morphine, and tylenol. Pancytopenia remained stable during admission. Course was complicated by watery diarrhea 2/2 antibiotics, although patient never developed a rising WBC count or fever. GI PCR and C Diff Panel were both negative.    Important Medication Changes and Reason:    Please take your ____ for 7 total days, ending on 12/23.    Active or Pending Issues Requiring Follow-up:    Please follow-up with your PCP in 1-2 weeks  Please follow-up with your oncologist in 1-2 weeks         HPI:  63F hx of breast cancer on chemo with Dr. Blankenship at [facility], DM2, HLD, HTN here for dysuria and urinary urgency with flank pain concerning for pyelonephritis.    Symptoms began 3 days prior, last chemotherapy session on 12/12. Patient began having significant polyuria, to the point she was worried about incontinence, urgency, dysuria (stinging but not burning per patient). She has not had a hx of kidney stones and she has denies hematuria. However, she has had reduced PO intake over the past several days. She complains of nausea, but denies vomiting.    Patient denies a history of UTIs, BMs are regular, and she denies chest pain, shortness of breath/wheezing.    Upon arrival to the ED, she was given 1L NS and started on IV CTX. Patient had flank pain, worse on the L side and was given lidocaine patch and IV morphine. Patient's pain has mostly dissipated. CT Chest Abdomen Pelvis without any acute findings. (17 Dec 2023 17:05)    Hospital Course: Patient was hospitalized with pyelonephritis iso pancytopenia 2/2 recent chemo. Patient on Neulasta outpatient, and did have mild neutropenia. However, patient was afebrile during stay, and was on CTX. Urine cultures grew GNRs which eventually speciated to Citrobacter freundii, and patient was transitioned to Bactrim. Pain was managed with lidocaine patches, morphine, and tylenol. Pancytopenia remained stable during admission. Course was complicated by watery diarrhea 2/2 antibiotics, although patient never developed a rising WBC count or fever. GI PCR and C Diff Panel were both negative.    Important Medication Changes and Reason:    Please take your Bactrim for 14 total days, ending on 1/3/2024.    Active or Pending Issues Requiring Follow-up:    Please follow-up with your PCP in 1-2 weeks  Please follow-up with your oncologist in 1-2 weeks         HPI:  63F hx of breast cancer on chemo with Dr. Blankenship at [facility], DM2, HLD, HTN here for dysuria and urinary urgency with flank pain concerning for pyelonephritis.    Symptoms began 3 days prior, last chemotherapy session on 12/12. Patient began having significant polyuria, to the point she was worried about incontinence, urgency, dysuria (stinging but not burning per patient). She has not had a hx of kidney stones and she has denies hematuria. However, she has had reduced PO intake over the past several days. She complains of nausea, but denies vomiting.    Patient denies a history of UTIs, BMs are regular, and she denies chest pain, shortness of breath/wheezing.    Upon arrival to the ED, she was given 1L NS and started on IV CTX. Patient had flank pain, worse on the L side and was given lidocaine patch and IV morphine. Patient's pain has mostly dissipated. CT Chest Abdomen Pelvis without any acute findings. (17 Dec 2023 17:05)    Hospital Course: Patient was hospitalized with pyelonephritis iso pancytopenia 2/2 recent chemo. Patient on Neulasta outpatient, and did have mild neutropenia. However, patient was afebrile during stay, and was on CTX. Urine cultures grew GNRs which eventually speciated to Citrobacter freundii, and patient was transitioned to Bactrim. Pain was managed with lidocaine patches, morphine, and tylenol. Pancytopenia remained stable during admission. Course was complicated by watery diarrhea 2/2 antibiotics, although patient never developed a rising WBC count or fever. GI PCR and C Diff Panel were both negative.    Important Medication Changes and Reason:    Please take your Bactrim for 14 total days, ending on 1/3/2024.    Active or Pending Issues Requiring Follow-up:    Please follow-up with your PCP in 1-2 weeks  Please follow-up with your oncologist in 1-2 weeks         HPI:  63F hx of breast cancer on chemo with Dr. Blankenship at [facility], DM2, HLD, HTN here for dysuria and urinary urgency with flank pain concerning for pyelonephritis.    Symptoms began 3 days prior, last chemotherapy session on 12/12. Patient began having significant polyuria, to the point she was worried about incontinence, urgency, dysuria (stinging but not burning per patient). She has not had a hx of kidney stones and she has denies hematuria. However, she has had reduced PO intake over the past several days. She complains of nausea, but denies vomiting.    Patient denies a history of UTIs, BMs are regular, and she denies chest pain, shortness of breath/wheezing.    Upon arrival to the ED, she was given 1L NS and started on IV CTX. Patient had flank pain, worse on the L side and was given lidocaine patch and IV morphine. Patient's pain has mostly dissipated. CT Chest Abdomen Pelvis without any acute findings. (17 Dec 2023 17:05)    Hospital Course: Patient was hospitalized with pyelonephritis iso pancytopenia 2/2 recent chemo. Patient on Neulasta outpatient, and did have mild neutropenia. However, patient was afebrile during stay, and was on CTX. Urine cultures grew GNRs which eventually speciated to Citrobacter freundii, and patient was transitioned to Bactrim. Pain was managed with lidocaine patches, morphine, and tylenol. Pancytopenia remained stable during admission. Course was complicated by watery diarrhea 2/2 antibiotics, although patient never developed a rising WBC count or fever. GI PCR and C Diff Panel were both negative.    Important Medication Changes and Reason:    Please take your Bactrim for 14 total days, ending on 1/3/2024.    Active or Pending Issues Requiring Follow-up:    Please follow-up with your PCP in 1-2 weeks  Please follow-up with your oncologist in 1-2 weeks  Mediport not accessed during this admission, please follow up with your oncologist Dr. Blankenship for further care.         HPI:  63F hx of breast cancer on chemo with Dr. Blankenship at [facility], DM2, HLD, HTN here for dysuria and urinary urgency with flank pain concerning for pyelonephritis.    Symptoms began 3 days prior, last chemotherapy session on 12/12. Patient began having significant polyuria, to the point she was worried about incontinence, urgency, dysuria (stinging but not burning per patient). She has not had a hx of kidney stones and she has denies hematuria. However, she has had reduced PO intake over the past several days. She complains of nausea, but denies vomiting.    Patient denies a history of UTIs, BMs are regular, and she denies chest pain, shortness of breath/wheezing.    Upon arrival to the ED, she was given 1L NS and started on IV CTX. Patient had flank pain, worse on the L side and was given lidocaine patch and IV morphine. Patient's pain has mostly dissipated. CT Chest Abdomen Pelvis without any acute findings. (17 Dec 2023 17:05)    Hospital Course: Patient was hospitalized with pyelonephritis iso pancytopenia 2/2 recent chemo. Patient on Neulasta outpatient, and did have mild neutropenia. However, patient was afebrile during stay, and was on CTX. Urine cultures grew GNRs which eventually speciated to Citrobacter freundii, and patient was transitioned to Bactrim. Pain was managed with lidocaine patches, morphine, and tylenol. Pancytopenia remained stable during admission. Course was complicated by watery diarrhea 2/2 antibiotics, although patient never developed a rising WBC count or fever. GI PCR and C Diff Panel were both negative.    Important Medication Changes and Reason:    Please take your Bactrim for 14 total days, ending on 1/3/2024.    Active or Pending Issues Requiring Follow-up:    Please follow-up with your PCP in 1-2 weeks  Please follow-up with your oncologist in 1-2 weeks  Mediport not accessed during this admission, please follow up with your oncologist Dr. Blankenship for further care.

## 2023-12-19 NOTE — PROGRESS NOTE ADULT - PROBLEM SELECTOR PLAN 5
On telmisartan 40mg at home  - Losartan 50mg inpatient DM2, has been off insulin for a month. Prescribed 15U Lantus at home, but does not use because her BGs are 80s-90s.  - LDSSI  - A1C 5.8

## 2023-12-19 NOTE — DISCHARGE NOTE PROVIDER - CARE PROVIDER_API CALL
uRssell Horn  Phone: [phone]  Fax: (   )    -  Follow Up Time: 2 weeks   Russell Horn  Phone: [phone]  Fax: (   )    -  Follow Up Time: 2 weeks   Russell Horn  Phone: [phone]  Fax: (   )    -  Follow Up Time: 2 weeks    Antoinette Blankenship [facility]  [address] [place] [id number]  Phone: [phone]  Fax: [phone]  Follow Up Time: 2 weeks   Russell Horn  Phone: [phone]  Fax: (   )    -  Follow Up Time: 2 weeks    Antoinette Blankenship [facility]  [address] [place] [id number]  Phone: [phone]  Fax: [phone]  Follow Up Time: 2 weeks

## 2023-12-19 NOTE — DISCHARGE NOTE PROVIDER - NSDCMRMEDTOKEN_GEN_ALL_CORE_FT
ALBUTEROL HFA 90 MCG INHALER:   ATORVASTATIN 40MG TABLETS: TAKE 1 TABLET BY MOUTH ONCE DAILY  LANTUS SOLOSTAR 100 UNIT/ML:   MONTELUKAST 10MG TABLETS: TAKE 1 TABLET BY MOUTH ONCE DAILY  OMEPRAZOLE DR 40 MG CAPSULE:   TELMISARTAN 40MG TABLETS: TAKE 1 TABLET BY MOUTH ONCE DAILY   ALBUTEROL HFA 90 MCG INHALER:   ATORVASTATIN 40MG TABLETS: TAKE 1 TABLET BY MOUTH ONCE DAILY  LANTUS SOLOSTAR 100 UNIT/ML:   MONTELUKAST 10MG TABLETS: TAKE 1 TABLET BY MOUTH ONCE DAILY  OMEPRAZOLE DR 40 MG CAPSULE:   Outpatient Physical Therapy: ICD-10: M54.50  Low back pain, unspecified  TELMISARTAN 40MG TABLETS: TAKE 1 TABLET BY MOUTH ONCE DAILY   acetaminophen 325 mg oral tablet: 2 tab(s) orally every 6 hours as needed for  Mild to Moderate Pain  ALBUTEROL HFA 90 MCG INHALER:   ATORVASTATIN 40MG TABLETS: TAKE 1 TABLET BY MOUTH ONCE DAILY  cyclobenzaprine 5 mg oral tablet: 1 tab(s) orally 3 times a day as needed for Muscle Spasm Medication can be sedating. Avoid operating heavy machinery after taking medication.  LANTUS SOLOSTAR 100 UNIT/ML:   lidocaine 4% topical film: Apply topically to affected area once a day  loperamide 2 mg oral capsule: 1 cap(s) orally every 4 hours as needed for Diarrhea  MONTELUKAST 10MG TABLETS: TAKE 1 TABLET BY MOUTH ONCE DAILY  OMEPRAZOLE DR 40 MG CAPSULE:   Outpatient Physical Therapy: ICD-10: M54.50  Low back pain, unspecified  TELMISARTAN 40MG TABLETS: TAKE 1 TABLET BY MOUTH ONCE DAILY   acetaminophen 325 mg oral tablet: 2 tab(s) orally every 6 hours as needed for  Mild to Moderate Pain  ALBUTEROL HFA 90 MCG INHALER:   ATORVASTATIN 40MG TABLETS: 1 tab(s) orally once a day TAKE 1 TABLET BY MOUTH ONCE DAILY  cyclobenzaprine 5 mg oral tablet: 1 tab(s) orally 3 times a day as needed for Muscle Spasm Medication can be sedating. Avoid operating heavy machinery after taking medication.  ibuprofen 600 mg oral tablet: 1 tab(s) orally 3 times a day as needed for Moderate Pain (4 - 6), Severe Pain (7 - 10)  lidocaine 4% topical film: Apply topically to affected area once a day  loperamide 2 mg oral capsule: 1 cap(s) orally every 4 hours as needed for Diarrhea  MONTELUKAST 10MG TABLETS: 1 tab(s) orally once a day TAKE 1 TABLET BY MOUTH ONCE DAILY  OMEPRAZOLE DR 40 MG CAPSULE: 1 tab(s) orally once a day  Outpatient Physical Therapy: ICD-10: M54.50  Low back pain, unspecified  sulfamethoxazole-trimethoprim 800 mg-160 mg oral tablet: 1 tab(s) orally 2 times a day  TELMISARTAN 40MG TABLETS: TAKE 1 TABLET BY MOUTH ONCE DAILY

## 2023-12-19 NOTE — DISCHARGE NOTE PROVIDER - PROVIDER TOKENS
FREE:[LAST:[Kory],FIRST:[Russell],PHONE:[phone]],FAX:[(   )    -],FOLLOWUP:[2 weeks]] FREE:[LAST:[Kory],FIRST:[Russell],PHONE:[phone]],FAX:[(   )    -],FOLLOWUP:[2 weeks]] FREE:[LAST:[Kory],FIRST:[Russell],PHONE:[phone]],FAX:[(   )    -],FOLLOWUP:[2 weeks]],PROVIDER:[TOKEN:[[id number]],FOLLOWUP:[2 weeks]] FREE:[LAST:[Kory],FIRST:[Russell],PHONE:[phone]],FAX:[(   )    -],FOLLOWUP:[2 weeks]],PROVIDER:[TOKEN:[[id number]],FOLLOWUP:[2 weeks]]

## 2023-12-19 NOTE — PROGRESS NOTE ADULT - SUBJECTIVE AND OBJECTIVE BOX
PATIENT: JACQUIE GRANDA, MRN: [id number]    CHIEF COMPLAINT: Patient is a 63y old  Female who presents with a chief complaint of pyelonephritis (18 Dec 2023 07:55)      INTERVAL HISTORY/OVERNIGHT EVENTS: No overnight events.       MEDICATIONS:  MEDICATIONS  (STANDING):  atorvastatin 40 milliGRAM(s) Oral at bedtime  cefTRIAXone   IVPB 1000 milliGRAM(s) IV Intermittent every 24 hours  chlorhexidine 2% Cloths 1 Application(s) Topical daily  dextrose 5%. 1000 milliLiter(s) (50 mL/Hr) IV Continuous <Continuous>  dextrose 5%. 1000 milliLiter(s) (100 mL/Hr) IV Continuous <Continuous>  dextrose 50% Injectable 12.5 Gram(s) IV Push once  dextrose 50% Injectable 25 Gram(s) IV Push once  dextrose 50% Injectable 25 Gram(s) IV Push once  enoxaparin Injectable 40 milliGRAM(s) SubCutaneous every 12 hours  glucagon  Injectable 1 milliGRAM(s) IntraMuscular once  influenza   Vaccine 0.5 milliLiter(s) IntraMuscular once  insulin lispro (ADMELOG) corrective regimen sliding scale   SubCutaneous three times a day before meals  insulin lispro (ADMELOG) corrective regimen sliding scale   SubCutaneous at bedtime  losartan 50 milliGRAM(s) Oral daily  montelukast 10 milliGRAM(s) Oral daily  pantoprazole    Tablet 40 milliGRAM(s) Oral before breakfast    MEDICATIONS  (PRN):  acetaminophen     Tablet .. 650 milliGRAM(s) Oral every 6 hours PRN Moderate Pain (4 - 6), Severe Pain (7 - 10)  dextrose Oral Gel 15 Gram(s) Oral once PRN Blood Glucose LESS THAN 70 milliGRAM(s)/deciliter      ALLERGIES: Allergies    amoxicillin (Hives)    Intolerances        OBJECTIVE:  ICU Vital Signs Last 24 Hrs  T(C): 36.5 (19 Dec 2023 05:23), Max: 36.7 (18 Dec 2023 21:10)  T(F): 97.7 (19 Dec 2023 05:23), Max: 98.1 (18 Dec 2023 21:10)  HR: 85 (19 Dec 2023 05:23) (79 - 95)  BP: 113/67 (19 Dec 2023 05:23) (102/61 - 124/89)  BP(mean): --  ABP: --  ABP(mean): --  RR: 17 (19 Dec 2023 05:23) (17 - 18)  SpO2: 100% (19 Dec 2023 05:23) (100% - 100%)    O2 Parameters below as of 19 Dec 2023 05:23  Patient On (Oxygen Delivery Method): room air            CAPILLARY BLOOD GLUCOSE      POCT Blood Glucose.: 81 mg/dL (18 Dec 2023 21:27)  POCT Blood Glucose.: 78 mg/dL (18 Dec 2023 18:07)  POCT Blood Glucose.: 126 mg/dL (18 Dec 2023 12:57)  POCT Blood Glucose.: 94 mg/dL (18 Dec 2023 08:55)    CAPILLARY BLOOD GLUCOSE      POCT Blood Glucose.: 81 mg/dL (18 Dec 2023 21:27)    I&O's Summary    Daily     Daily     PHYSICAL EXAMINATION:  General: Comfortable, no acute distress, cooperative with exam.  HEENT: PERRLA  Respiratory: CTAB, normal respiratory effort, no coughing, wheezes, crackles, or rales.  CV: RRR, S1S2, no murmurs, rubs or gallops. No JVD. Distal pulses intact.  Abdominal: Soft, nontender, nondistended, no rebound or guarding, normal bowel sounds.  Neurology: AOx3, no focal neuro defects, ROGERS x 4.  Extremities: No pitting edema, + Peripheral pulses.      LABS:                          9.9    2.38  )-----------( 125      ( 18 Dec 2023 06:30 )             30.4     12-18    137  |  99  |  17  ----------------------------<  106<H>  4.1   |  24  |  0.82    Ca    9.7      18 Dec 2023 06:30  Phos  3.2     12-18  Mg     1.70     12-18    TPro  6.6  /  Alb  4.3  /  TBili  1.1  /  DBili  x   /  AST  17  /  ALT  16  /  AlkPhos  125<H>  12-18    LIVER FUNCTIONS - ( 18 Dec 2023 06:30 )  Alb: 4.3 g/dL / Pro: 6.6 g/dL / ALK PHOS: 125 U/L / ALT: 16 U/L / AST: 17 U/L / GGT: x                   Urinalysis Basic - ( 18 Dec 2023 06:30 )    Color: x / Appearance: x / SG: x / pH: x  Gluc: 106 mg/dL / Ketone: x  / Bili: x / Urobili: x   Blood: x / Protein: x / Nitrite: x   Leuk Esterase: x / RBC: x / WBC x   Sq Epi: x / Non Sq Epi: x / Bacteria: x       PATIENT: JACQUIE GRANDA, MRN: [id number]    CHIEF COMPLAINT: Patient is a 63y old  Female who presents with a chief complaint of pyelonephritis (18 Dec 2023 07:55)      INTERVAL HISTORY/OVERNIGHT EVENTS: No overnight events.       MEDICATIONS:  MEDICATIONS  (STANDING):  atorvastatin 40 milliGRAM(s) Oral at bedtime  cefTRIAXone   IVPB 1000 milliGRAM(s) IV Intermittent every 24 hours  chlorhexidine 2% Cloths 1 Application(s) Topical daily  dextrose 5%. 1000 milliLiter(s) (50 mL/Hr) IV Continuous <Continuous>  dextrose 5%. 1000 milliLiter(s) (100 mL/Hr) IV Continuous <Continuous>  dextrose 50% Injectable 12.5 Gram(s) IV Push once  dextrose 50% Injectable 25 Gram(s) IV Push once  dextrose 50% Injectable 25 Gram(s) IV Push once  enoxaparin Injectable 40 milliGRAM(s) SubCutaneous every 12 hours  glucagon  Injectable 1 milliGRAM(s) IntraMuscular once  influenza   Vaccine 0.5 milliLiter(s) IntraMuscular once  insulin lispro (ADMELOG) corrective regimen sliding scale   SubCutaneous three times a day before meals  insulin lispro (ADMELOG) corrective regimen sliding scale   SubCutaneous at bedtime  losartan 50 milliGRAM(s) Oral daily  montelukast 10 milliGRAM(s) Oral daily  pantoprazole    Tablet 40 milliGRAM(s) Oral before breakfast    MEDICATIONS  (PRN):  acetaminophen     Tablet .. 650 milliGRAM(s) Oral every 6 hours PRN Moderate Pain (4 - 6), Severe Pain (7 - 10)  dextrose Oral Gel 15 Gram(s) Oral once PRN Blood Glucose LESS THAN 70 milliGRAM(s)/deciliter      ALLERGIES: Allergies    amoxicillin (Hives)    Intolerances        OBJECTIVE:  ICU Vital Signs Last 24 Hrs  T(C): 36.5 (19 Dec 2023 05:23), Max: 36.7 (18 Dec 2023 21:10)  T(F): 97.7 (19 Dec 2023 05:23), Max: 98.1 (18 Dec 2023 21:10)  HR: 85 (19 Dec 2023 05:23) (79 - 95)  BP: 113/67 (19 Dec 2023 05:23) (102/61 - 124/89)  BP(mean): --  ABP: --  ABP(mean): --  RR: 17 (19 Dec 2023 05:23) (17 - 18)  SpO2: 100% (19 Dec 2023 05:23) (100% - 100%)    O2 Parameters below as of 19 Dec 2023 05:23  Patient On (Oxygen Delivery Method): room air            CAPILLARY BLOOD GLUCOSE      POCT Blood Glucose.: 81 mg/dL (18 Dec 2023 21:27)  POCT Blood Glucose.: 78 mg/dL (18 Dec 2023 18:07)  POCT Blood Glucose.: 126 mg/dL (18 Dec 2023 12:57)  POCT Blood Glucose.: 94 mg/dL (18 Dec 2023 08:55)    CAPILLARY BLOOD GLUCOSE      POCT Blood Glucose.: 81 mg/dL (18 Dec 2023 21:27)    I&O's Summary    Daily     Daily     PHYSICAL EXAMINATION:  General: Comfortable, no acute distress, cooperative with exam.  HEENT: PERRLA  Respiratory: CTAB, normal respiratory effort, no coughing, wheezes, crackles, or rales.  CV: RRR, S1S2, no murmurs, rubs or gallops. No JVD. Distal pulses intact.  Abdominal: Soft, nontender, nondistended, no rebound or guarding, normal bowel sounds.  Neurology: AOx3, no focal neuro defects, ROGERS x 4.  Extremities: No pitting edema, + Peripheral pulses.      LABS:                          9.9    2.38  )-----------( 125      ( 18 Dec 2023 06:30 )             30.4     12-18    137  |  99  |  17  ----------------------------<  106<H>  4.1   |  24  |  0.82    Ca    9.7      18 Dec 2023 06:30  Phos  3.2     12-18  Mg     1.70     12-18    TPro  6.6  /  Alb  4.3  /  TBili  1.1  /  DBili  x   /  AST  17  /  ALT  16  /  AlkPhos  125<H>  12-18    LIVER FUNCTIONS - ( 18 Dec 2023 06:30 )  Alb: 4.3 g/dL / Pro: 6.6 g/dL / ALK PHOS: 125 U/L / ALT: 16 U/L / AST: 17 U/L / GGT: x                   Urinalysis Basic - ( 18 Dec 2023 06:30 )    Color: x / Appearance: x / SG: x / pH: x  Gluc: 106 mg/dL / Ketone: x  / Bili: x / Urobili: x   Blood: x / Protein: x / Nitrite: x   Leuk Esterase: x / RBC: x / WBC x   Sq Epi: x / Non Sq Epi: x / Bacteria: x       PATIENT: JACQUIE GRANDA, MRN: [id number]    CHIEF COMPLAINT: Patient is a 63y old  Female who presents with a chief complaint of pyelonephritis (18 Dec 2023 07:55)      INTERVAL HISTORY/OVERNIGHT EVENTS: No overnight events. Still with similar CVA tenderness on L side, no fevers, eating but not very well. 6 episodes of watery diarrhea after antibiotics, has had similar reactions previously. No oil slick. Brown stools.      MEDICATIONS:  MEDICATIONS  (STANDING):  atorvastatin 40 milliGRAM(s) Oral at bedtime  cefTRIAXone   IVPB 1000 milliGRAM(s) IV Intermittent every 24 hours  chlorhexidine 2% Cloths 1 Application(s) Topical daily  dextrose 5%. 1000 milliLiter(s) (50 mL/Hr) IV Continuous <Continuous>  dextrose 5%. 1000 milliLiter(s) (100 mL/Hr) IV Continuous <Continuous>  dextrose 50% Injectable 12.5 Gram(s) IV Push once  dextrose 50% Injectable 25 Gram(s) IV Push once  dextrose 50% Injectable 25 Gram(s) IV Push once  enoxaparin Injectable 40 milliGRAM(s) SubCutaneous every 12 hours  glucagon  Injectable 1 milliGRAM(s) IntraMuscular once  influenza   Vaccine 0.5 milliLiter(s) IntraMuscular once  insulin lispro (ADMELOG) corrective regimen sliding scale   SubCutaneous three times a day before meals  insulin lispro (ADMELOG) corrective regimen sliding scale   SubCutaneous at bedtime  losartan 50 milliGRAM(s) Oral daily  montelukast 10 milliGRAM(s) Oral daily  pantoprazole    Tablet 40 milliGRAM(s) Oral before breakfast    MEDICATIONS  (PRN):  acetaminophen     Tablet .. 650 milliGRAM(s) Oral every 6 hours PRN Moderate Pain (4 - 6), Severe Pain (7 - 10)  dextrose Oral Gel 15 Gram(s) Oral once PRN Blood Glucose LESS THAN 70 milliGRAM(s)/deciliter      ALLERGIES: Allergies    amoxicillin (Hives)    Intolerances        OBJECTIVE:  ICU Vital Signs Last 24 Hrs  T(C): 36.5 (19 Dec 2023 05:23), Max: 36.7 (18 Dec 2023 21:10)  T(F): 97.7 (19 Dec 2023 05:23), Max: 98.1 (18 Dec 2023 21:10)  HR: 85 (19 Dec 2023 05:23) (79 - 95)  BP: 113/67 (19 Dec 2023 05:23) (102/61 - 124/89)  BP(mean): --  ABP: --  ABP(mean): --  RR: 17 (19 Dec 2023 05:23) (17 - 18)  SpO2: 100% (19 Dec 2023 05:23) (100% - 100%)    O2 Parameters below as of 19 Dec 2023 05:23  Patient On (Oxygen Delivery Method): room air            CAPILLARY BLOOD GLUCOSE      POCT Blood Glucose.: 81 mg/dL (18 Dec 2023 21:27)  POCT Blood Glucose.: 78 mg/dL (18 Dec 2023 18:07)  POCT Blood Glucose.: 126 mg/dL (18 Dec 2023 12:57)  POCT Blood Glucose.: 94 mg/dL (18 Dec 2023 08:55)    CAPILLARY BLOOD GLUCOSE      POCT Blood Glucose.: 81 mg/dL (18 Dec 2023 21:27)    I&O's Summary    Daily     Daily     PHYSICAL EXAMINATION:  General: Comfortable, no acute distress, cooperative with exam.  Respiratory: CTAB, normal respiratory effort, no coughing, wheezes, crackles, or rales.  CV: RRR, S1S2, no murmurs, rubs or gallops. No JVD. Distal pulses intact.  Abdominal: Soft, nontender, nondistended, no rebound or guarding  Skin: Mild L CVA tenderness  Neurology: no focal neuro defects, ROGERS x 4.  Extremities: No pitting edema      LABS:                          9.9    2.38  )-----------( 125      ( 18 Dec 2023 06:30 )             30.4     12-18    137  |  99  |  17  ----------------------------<  106<H>  4.1   |  24  |  0.82    Ca    9.7      18 Dec 2023 06:30  Phos  3.2     12-18  Mg     1.70     12-18    TPro  6.6  /  Alb  4.3  /  TBili  1.1  /  DBili  x   /  AST  17  /  ALT  16  /  AlkPhos  125<H>  12-18    LIVER FUNCTIONS - ( 18 Dec 2023 06:30 )  Alb: 4.3 g/dL / Pro: 6.6 g/dL / ALK PHOS: 125 U/L / ALT: 16 U/L / AST: 17 U/L / GGT: x                   Urinalysis Basic - ( 18 Dec 2023 06:30 )    Color: x / Appearance: x / SG: x / pH: x  Gluc: 106 mg/dL / Ketone: x  / Bili: x / Urobili: x   Blood: x / Protein: x / Nitrite: x   Leuk Esterase: x / RBC: x / WBC x   Sq Epi: x / Non Sq Epi: x / Bacteria: x       PATIENT: JACQUIE GRANDA, MRN: [id number]    CHIEF COMPLAINT: Patient is a 63y old  Female who presents with a chief complaint of pyelonephritis (18 Dec 2023 07:55)      INTERVAL HISTORY/OVERNIGHT EVENTS: No overnight events. Still with similar CVA tenderness on L side, no fevers, eating but not very well. 6 episodes of watery diarrhea after antibiotics, has had similar reactions previously. No oil slick. Brown stools.      MEDICATIONS:  MEDICATIONS  (STANDING):  atorvastatin 40 milliGRAM(s) Oral at bedtime  cefTRIAXone   IVPB 1000 milliGRAM(s) IV Intermittent every 24 hours  chlorhexidine 2% Cloths 1 Application(s) Topical daily  dextrose 5%. 1000 milliLiter(s) (50 mL/Hr) IV Continuous <Continuous>  dextrose 5%. 1000 milliLiter(s) (100 mL/Hr) IV Continuous <Continuous>  dextrose 50% Injectable 12.5 Gram(s) IV Push once  dextrose 50% Injectable 25 Gram(s) IV Push once  dextrose 50% Injectable 25 Gram(s) IV Push once  enoxaparin Injectable 40 milliGRAM(s) SubCutaneous every 12 hours  glucagon  Injectable 1 milliGRAM(s) IntraMuscular once  influenza   Vaccine 0.5 milliLiter(s) IntraMuscular once  insulin lispro (ADMELOG) corrective regimen sliding scale   SubCutaneous three times a day before meals  insulin lispro (ADMELOG) corrective regimen sliding scale   SubCutaneous at bedtime  losartan 50 milliGRAM(s) Oral daily  montelukast 10 milliGRAM(s) Oral daily  pantoprazole    Tablet 40 milliGRAM(s) Oral before breakfast    MEDICATIONS  (PRN):  acetaminophen     Tablet .. 650 milliGRAM(s) Oral every 6 hours PRN Moderate Pain (4 - 6), Severe Pain (7 - 10)  dextrose Oral Gel 15 Gram(s) Oral once PRN Blood Glucose LESS THAN 70 milliGRAM(s)/deciliter      ALLERGIES: Allergies    amoxicillin (Hives)    Intolerances        OBJECTIVE:  ICU Vital Signs Last 24 Hrs  T(C): 36.5 (19 Dec 2023 05:23), Max: 36.7 (18 Dec 2023 21:10)  T(F): 97.7 (19 Dec 2023 05:23), Max: 98.1 (18 Dec 2023 21:10)  HR: 85 (19 Dec 2023 05:23) (79 - 95)  BP: 113/67 (19 Dec 2023 05:23) (102/61 - 124/89)  BP(mean): --  ABP: --  ABP(mean): --  RR: 17 (19 Dec 2023 05:23) (17 - 18)  SpO2: 100% (19 Dec 2023 05:23) (100% - 100%)    O2 Parameters below as of 19 Dec 2023 05:23  Patient On (Oxygen Delivery Method): room air            CAPILLARY BLOOD GLUCOSE      POCT Blood Glucose.: 81 mg/dL (18 Dec 2023 21:27)  POCT Blood Glucose.: 78 mg/dL (18 Dec 2023 18:07)  POCT Blood Glucose.: 126 mg/dL (18 Dec 2023 12:57)  POCT Blood Glucose.: 94 mg/dL (18 Dec 2023 08:55)    CAPILLARY BLOOD GLUCOSE      POCT Blood Glucose.: 81 mg/dL (18 Dec 2023 21:27)    I&O's Summary    Daily     Daily     PHYSICAL EXAMINATION:  General: Comfortable, no acute distress, cooperative with exam.  Respiratory: CTAB, normal respiratory effort, no coughing, wheezes, crackles, or rales.  CV: RRR, S1S2, no murmurs, rubs or gallops. No JVD. Distal pulses intact.  Abdominal: Soft, nontender, nondistended, no rebound or guarding  Skin: Mild L CVA tenderness  Neurology: no focal neuro defects, ROGERS x 4.  Extremities: No pitting edema      LABS:                          9.9    2.38  )-----------( 125      ( 18 Dec 2023 06:30 )             30.4     12-18    137  |  99  |  17  ----------------------------<  106<H>  4.1   |  24  |  0.82    Ca    9.7      18 Dec 2023 06:30  Phos  3.2     12-18  Mg     1.70     12-18    TPro  6.6  /  Alb  4.3  /  TBili  1.1  /  DBili  x   /  AST  17  /  ALT  16  /  AlkPhos  125<H>  12-18    LIVER FUNCTIONS - ( 18 Dec 2023 06:30 )  Alb: 4.3 g/dL / Pro: 6.6 g/dL / ALK PHOS: 125 U/L / ALT: 16 U/L / AST: 17 U/L / GGT: x                   Urinalysis Basic - ( 18 Dec 2023 06:30 )    Color: x / Appearance: x / SG: x / pH: x  Gluc: 106 mg/dL / Ketone: x  / Bili: x / Urobili: x   Blood: x / Protein: x / Nitrite: x   Leuk Esterase: x / RBC: x / WBC x   Sq Epi: x / Non Sq Epi: x / Bacteria: x

## 2023-12-19 NOTE — PROGRESS NOTE ADULT - PROBLEM SELECTOR PLAN 6
On atorvastatin 40mg at home  - Continue home atorvastatin On telmisartan 40mg at home  - Losartan 50mg inpatient

## 2023-12-19 NOTE — DISCHARGE NOTE PROVIDER - NSDCCPCAREPLAN_GEN_ALL_CORE_FT
PRINCIPAL DISCHARGE DIAGNOSIS  Diagnosis: Pyelonephritis  Assessment and Plan of Treatment: Your pyelonephritis caused you to have pain in your back, which responded well to antibiotics. However, because you have a weakened immune system from recent chemotherapy, and because you were not able to eat very much, you were admitted. You are being discharged on oral antibiotics. If you are unable to eat and drink, notice you are becoming more dehydrated, or develop a fever or worsening back pain, please call your PCP or return to the hospital.      SECONDARY DISCHARGE DIAGNOSES  Diagnosis: Pancytopenia due to chemotherapy  Assessment and Plan of Treatment: You recently had chemotherapy, which likely led to a drop in your cell counts. This is normal after chemo, but does put your immune system at risk. This is part of the reason you were admitted for pyelonephritis. Please continue to follow with your oncologist to monitor your blood levels.    Diagnosis: Diabetes  Assessment and Plan of Treatment: Your diabetes was well controlled in the hospital, and your sugars were generally within normal limits. Please return to the hospital if you begin to feel symptoms of hypoglycemia such as shaking chills, sweating, or confusion.    Diagnosis: Diarrheal disease  Assessment and Plan of Treatment: Diarrhea can be caused by antibiotics, which is what we believe happened. We collected samples of your stool which showed ***.     PRINCIPAL DISCHARGE DIAGNOSIS  Diagnosis: Pyelonephritis  Assessment and Plan of Treatment: Your pyelonephritis caused you to have pain in your back, which responded well to antibiotics. However, because you have a weakened immune system from recent chemotherapy, and because you were not able to eat very much, you were admitted. You are being discharged on oral antibiotics. If you are unable to eat and drink, notice you are becoming more dehydrated, or develop a fever or worsening back pain, please call your PCP or return to the hospital.      SECONDARY DISCHARGE DIAGNOSES  Diagnosis: Diarrheal disease  Assessment and Plan of Treatment: Diarrhea can be caused by antibiotics, which is what we believe happened. We collected samples of your stool which did not show an infection. If you develop worsening diarrhea and are not able to stay hydrated, please call your PCP or go to the hospital.    Diagnosis: Pancytopenia due to chemotherapy  Assessment and Plan of Treatment: You recently had chemotherapy, which likely led to a drop in your cell counts. This is normal after chemo, but does put your immune system at risk. This is part of the reason you were admitted for pyelonephritis. Please continue to follow with your oncologist to monitor your blood levels.    Diagnosis: Diabetes  Assessment and Plan of Treatment: Your diabetes was well controlled in the hospital, and your sugars were generally within normal limits. Please return to the hospital if you begin to feel symptoms of hypoglycemia such as shaking chills, sweating, or confusion.

## 2023-12-19 NOTE — DISCHARGE NOTE PROVIDER - NSDCCPTREATMENT_GEN_ALL_CORE_FT
PRINCIPAL PROCEDURE  Procedure: CT abdomen  Findings and Treatment: FINDINGS:  CHEST:  LUNGS AND LARGE AIRWAYS: No endobronchial lesions. No pulmonary nodules   or parenchymal consolidation.  PLEURA: No pleural effusion.  VESSELS: Atherosclerotic changes of the aorta and coronary arteries.  HEART: Heart size is normal. No pericardial effusion.  MEDIASTINUM AND FAYE: No lymphadenopathy.  CHEST WALL AND LOWER NECK: Right chest wall port with catheter   terminating in the SVC.  ABDOMEN AND PELVIS:  LIVER: Within normal limits.  BILE DUCTS: Normal caliber.  GALLBLADDER: Within normal limits.  SPLEEN: Within normal limits.  PANCREAS: Within normal limits.  ADRENALS: Within normal limits.  KIDNEYS/URETERS: No hydronephrosis. Bilateral subcentimeter hypodensities   too small characterize. Symmetric renal enhancement.  BLADDER: Within normal limits.  REPRODUCTIVE ORGANS: Myomatous uterus.  BOWEL: No bowel obstruction. Colonic diverticulosis without   diverticulitis. Appendix is normal.  PERITONEUM: No ascites.  VESSELS: Atherosclerotic changes.  RETROPERITONEUM/LYMPH NODES: No lymphadenopathy.  ABDOMINAL WALL: Postsurgical changes.  BONES: Degenerative changes. Grade 1 anterolisthesis of L3 on L4.  IMPRESSION:  No acute pathology.

## 2023-12-20 ENCOUNTER — TRANSCRIPTION ENCOUNTER (OUTPATIENT)
Age: 63
End: 2023-12-20

## 2023-12-20 VITALS
TEMPERATURE: 98 F | OXYGEN SATURATION: 100 % | HEART RATE: 81 BPM | DIASTOLIC BLOOD PRESSURE: 61 MMHG | RESPIRATION RATE: 18 BRPM | SYSTOLIC BLOOD PRESSURE: 103 MMHG

## 2023-12-20 LAB
-  AMOXICILLIN/CLAVULANIC ACID: SIGNIFICANT CHANGE UP
-  AMOXICILLIN/CLAVULANIC ACID: SIGNIFICANT CHANGE UP
-  AMPICILLIN/SULBACTAM: SIGNIFICANT CHANGE UP
-  AMPICILLIN/SULBACTAM: SIGNIFICANT CHANGE UP
-  AMPICILLIN: SIGNIFICANT CHANGE UP
-  AMPICILLIN: SIGNIFICANT CHANGE UP
-  AZTREONAM: SIGNIFICANT CHANGE UP
-  AZTREONAM: SIGNIFICANT CHANGE UP
-  CEFAZOLIN: SIGNIFICANT CHANGE UP
-  CEFAZOLIN: SIGNIFICANT CHANGE UP
-  CEFEPIME: SIGNIFICANT CHANGE UP
-  CEFEPIME: SIGNIFICANT CHANGE UP
-  CEFOXITIN: SIGNIFICANT CHANGE UP
-  CEFOXITIN: SIGNIFICANT CHANGE UP
-  CEFTRIAXONE: SIGNIFICANT CHANGE UP
-  CEFTRIAXONE: SIGNIFICANT CHANGE UP
-  CIPROFLOXACIN: SIGNIFICANT CHANGE UP
-  CIPROFLOXACIN: SIGNIFICANT CHANGE UP
-  ERTAPENEM: SIGNIFICANT CHANGE UP
-  ERTAPENEM: SIGNIFICANT CHANGE UP
-  GENTAMICIN: SIGNIFICANT CHANGE UP
-  GENTAMICIN: SIGNIFICANT CHANGE UP
-  IMIPENEM: SIGNIFICANT CHANGE UP
-  IMIPENEM: SIGNIFICANT CHANGE UP
-  LEVOFLOXACIN: SIGNIFICANT CHANGE UP
-  LEVOFLOXACIN: SIGNIFICANT CHANGE UP
-  MEROPENEM: SIGNIFICANT CHANGE UP
-  MEROPENEM: SIGNIFICANT CHANGE UP
-  NITROFURANTOIN: SIGNIFICANT CHANGE UP
-  NITROFURANTOIN: SIGNIFICANT CHANGE UP
-  PIPERACILLIN/TAZOBACTAM: SIGNIFICANT CHANGE UP
-  PIPERACILLIN/TAZOBACTAM: SIGNIFICANT CHANGE UP
-  TOBRAMYCIN: SIGNIFICANT CHANGE UP
-  TOBRAMYCIN: SIGNIFICANT CHANGE UP
-  TRIMETHOPRIM/SULFAMETHOXAZOLE: SIGNIFICANT CHANGE UP
-  TRIMETHOPRIM/SULFAMETHOXAZOLE: SIGNIFICANT CHANGE UP
C DIFF BY PCR RESULT: SIGNIFICANT CHANGE UP
C DIFF BY PCR RESULT: SIGNIFICANT CHANGE UP
CULTURE RESULTS: ABNORMAL
CULTURE RESULTS: ABNORMAL
GLUCOSE BLDC GLUCOMTR-MCNC: 87 MG/DL — SIGNIFICANT CHANGE UP (ref 70–99)
GLUCOSE BLDC GLUCOMTR-MCNC: 87 MG/DL — SIGNIFICANT CHANGE UP (ref 70–99)
GLUCOSE BLDC GLUCOMTR-MCNC: 90 MG/DL — SIGNIFICANT CHANGE UP (ref 70–99)
GLUCOSE BLDC GLUCOMTR-MCNC: 90 MG/DL — SIGNIFICANT CHANGE UP (ref 70–99)
METHOD TYPE: SIGNIFICANT CHANGE UP
METHOD TYPE: SIGNIFICANT CHANGE UP
ORGANISM # SPEC MICROSCOPIC CNT: ABNORMAL
SPECIMEN SOURCE: SIGNIFICANT CHANGE UP
SPECIMEN SOURCE: SIGNIFICANT CHANGE UP

## 2023-12-20 PROCEDURE — 99232 SBSQ HOSP IP/OBS MODERATE 35: CPT | Mod: GC

## 2023-12-20 RX ORDER — ACETAMINOPHEN 500 MG
2 TABLET ORAL
Qty: 240 | Refills: 0
Start: 2023-12-20 | End: 2024-01-18

## 2023-12-20 RX ORDER — ATORVASTATIN CALCIUM 80 MG/1
1 TABLET, FILM COATED ORAL
Qty: 0 | Refills: 1 | DISCHARGE

## 2023-12-20 RX ORDER — IBUPROFEN 200 MG
1 TABLET ORAL
Qty: 15 | Refills: 0
Start: 2023-12-20 | End: 2023-12-24

## 2023-12-20 RX ORDER — CYCLOBENZAPRINE HYDROCHLORIDE 10 MG/1
1 TABLET, FILM COATED ORAL
Qty: 90 | Refills: 0
Start: 2023-12-20 | End: 2024-01-18

## 2023-12-20 RX ORDER — OMEPRAZOLE 10 MG/1
1 CAPSULE, DELAYED RELEASE ORAL
Qty: 0 | Refills: 0 | DISCHARGE

## 2023-12-20 RX ORDER — CYCLOBENZAPRINE HYDROCHLORIDE 10 MG/1
5 TABLET, FILM COATED ORAL ONCE
Refills: 0 | Status: COMPLETED | OUTPATIENT
Start: 2023-12-20 | End: 2023-12-20

## 2023-12-20 RX ORDER — INSULIN GLARGINE 100 [IU]/ML
0 INJECTION, SOLUTION SUBCUTANEOUS
Qty: 0 | Refills: 0 | DISCHARGE

## 2023-12-20 RX ORDER — LIDOCAINE 4 G/100G
1 CREAM TOPICAL
Qty: 14 | Refills: 0
Start: 2023-12-20 | End: 2024-01-02

## 2023-12-20 RX ORDER — CYCLOBENZAPRINE HYDROCHLORIDE 10 MG/1
5 TABLET, FILM COATED ORAL THREE TIMES A DAY
Refills: 0 | Status: DISCONTINUED | OUTPATIENT
Start: 2023-12-20 | End: 2023-12-20

## 2023-12-20 RX ORDER — MONTELUKAST 4 MG/1
1 TABLET, CHEWABLE ORAL
Qty: 0 | Refills: 0 | DISCHARGE

## 2023-12-20 RX ORDER — LOPERAMIDE HCL 2 MG
1 TABLET ORAL
Qty: 18 | Refills: 0
Start: 2023-12-20 | End: 2023-12-22

## 2023-12-20 RX ORDER — IBUPROFEN 200 MG
600 TABLET ORAL THREE TIMES A DAY
Refills: 0 | Status: DISCONTINUED | OUTPATIENT
Start: 2023-12-20 | End: 2023-12-20

## 2023-12-20 RX ORDER — LIDOCAINE 4 G/100G
1 CREAM TOPICAL
Qty: 3 | Refills: 0
Start: 2023-12-20 | End: 2024-01-02

## 2023-12-20 RX ORDER — LOPERAMIDE HCL 2 MG
2 TABLET ORAL EVERY 4 HOURS
Refills: 0 | Status: DISCONTINUED | OUTPATIENT
Start: 2023-12-20 | End: 2023-12-20

## 2023-12-20 RX ADMIN — Medication 650 MILLIGRAM(S): at 08:02

## 2023-12-20 RX ADMIN — Medication 650 MILLIGRAM(S): at 07:02

## 2023-12-20 RX ADMIN — PANTOPRAZOLE SODIUM 40 MILLIGRAM(S): 20 TABLET, DELAYED RELEASE ORAL at 05:47

## 2023-12-20 RX ADMIN — MONTELUKAST 10 MILLIGRAM(S): 4 TABLET, CHEWABLE ORAL at 12:08

## 2023-12-20 RX ADMIN — CYCLOBENZAPRINE HYDROCHLORIDE 5 MILLIGRAM(S): 10 TABLET, FILM COATED ORAL at 12:06

## 2023-12-20 RX ADMIN — ENOXAPARIN SODIUM 40 MILLIGRAM(S): 100 INJECTION SUBCUTANEOUS at 05:47

## 2023-12-20 RX ADMIN — CHLORHEXIDINE GLUCONATE 1 APPLICATION(S): 213 SOLUTION TOPICAL at 12:07

## 2023-12-20 RX ADMIN — LIDOCAINE 1 PATCH: 4 CREAM TOPICAL at 12:07

## 2023-12-20 RX ADMIN — LIDOCAINE 1 PATCH: 4 CREAM TOPICAL at 03:25

## 2023-12-20 RX ADMIN — LOSARTAN POTASSIUM 50 MILLIGRAM(S): 100 TABLET, FILM COATED ORAL at 05:47

## 2023-12-20 RX ADMIN — CEFTRIAXONE 100 MILLIGRAM(S): 500 INJECTION, POWDER, FOR SOLUTION INTRAMUSCULAR; INTRAVENOUS at 12:06

## 2023-12-20 NOTE — PROGRESS NOTE ADULT - PROBLEM SELECTOR PLAN 2
Likely 2/2 antibiotics, less likely C Diff no WBC count no fever  - F/u GI PCR, F/u C Diff Likely 2/2 antibiotics, less likely C Diff no WBC count no fever  - F/u C Diff

## 2023-12-20 NOTE — PROGRESS NOTE ADULT - ATTENDING COMMENTS
63 y.o. F w/ a hx of breast cancer, DM2, HTN hospitalized for acute complicated UTI on Ceftriaxone.     Continues to report L sided flank pain which is unchanged- worse with movement. Dysuria resolved. Patient reports only drinking one cup of orange juice yesterday, no water. PO intake fair. Also reports 7 episodes of loose, watery stool in the last 24 hours.   Ucx >100K GNR     # Acute complicated UTI: Cont Ceftriaxone pending Ucx and Bcx. IVF while PO intake is limited   # Diarrhea: Check GI PCR, C diff   # Pancytopenia: Likely 2/2 chemotherapy. Follow up w/ OP Onc   # Breast cancer: On chemotherapy. See above. Glucerna supplements. IVF.
63 y.o. F w/ a hx of breast cancer, DM2, HTN hospitalized for acute complicated UTI on Ceftriaxone.     Continues to report L sided flank pain. Reports decreased PO intake as sense of taste has changed and patient is only able to tolerate soups she makes at home. Pancytopenic on labs.     # Acute complicated UTI: Cont Ceftriaxone pending Ucx and Bcx.   # Pancytopenia: Likely 2/2 chemotherapy. Will call OP onc office   # Breast cancer: On chemotherapy. See above. Glucerna supplements.
63 y.o. F w/ a hx of breast cancer, DM2, HTN hospitalized for acute complicated UTI on Ceftriaxone.     L sided pain improved today with heat pack. No other complaints. Had 3 BM's today.   Ucx >100K Citrobacter freundii   GI PCR neg    # Acute complicated UTI: Cont Ceftriaxone pending Ucx sensi and Bcx.   # Diarrhea: GI PCR neg, C diff pending    # Pancytopenia: Likely 2/2 chemotherapy. Follow up w/ OP Onc   # Breast cancer: On chemotherapy. See above. Glucerna supplements. IVF.

## 2023-12-20 NOTE — PROVIDER CONTACT NOTE (OTHER) - BACKGROUND
Patient diagnosed with tubulointerstitial nephritis
Patient admitted for tubulointestinal nephritis.   PMH of  GERD, HTN, diabetes, breast cancer.
standing/walking/toileting

## 2023-12-20 NOTE — PROGRESS NOTE ADULT - SUBJECTIVE AND OBJECTIVE BOX
PATIENT: JACQUIE GRANDA, MRN: [id number]    CHIEF COMPLAINT: Patient is a 63y old  Female who presents with a chief complaint of pyelonephritis (19 Dec 2023 16:49)      INTERVAL HISTORY/OVERNIGHT EVENTS: No overnight events.       MEDICATIONS:  MEDICATIONS  (STANDING):  atorvastatin 40 milliGRAM(s) Oral at bedtime  cefTRIAXone   IVPB 1000 milliGRAM(s) IV Intermittent every 24 hours  chlorhexidine 2% Cloths 1 Application(s) Topical daily  dextrose 5%. 1000 milliLiter(s) (50 mL/Hr) IV Continuous <Continuous>  dextrose 5%. 1000 milliLiter(s) (100 mL/Hr) IV Continuous <Continuous>  dextrose 50% Injectable 25 Gram(s) IV Push once  dextrose 50% Injectable 25 Gram(s) IV Push once  dextrose 50% Injectable 12.5 Gram(s) IV Push once  enoxaparin Injectable 40 milliGRAM(s) SubCutaneous every 12 hours  glucagon  Injectable 1 milliGRAM(s) IntraMuscular once  influenza   Vaccine 0.5 milliLiter(s) IntraMuscular once  insulin lispro (ADMELOG) corrective regimen sliding scale   SubCutaneous at bedtime  insulin lispro (ADMELOG) corrective regimen sliding scale   SubCutaneous three times a day before meals  lactated ringers. 1000 milliLiter(s) (100 mL/Hr) IV Continuous <Continuous>  lidocaine   4% Patch 1 Patch Transdermal daily  losartan 50 milliGRAM(s) Oral daily  montelukast 10 milliGRAM(s) Oral daily  pantoprazole    Tablet 40 milliGRAM(s) Oral before breakfast    MEDICATIONS  (PRN):  acetaminophen     Tablet .. 650 milliGRAM(s) Oral every 6 hours PRN Moderate Pain (4 - 6), Severe Pain (7 - 10)  dextrose Oral Gel 15 Gram(s) Oral once PRN Blood Glucose LESS THAN 70 milliGRAM(s)/deciliter      ALLERGIES: Allergies    amoxicillin (Hives)    Intolerances        OBJECTIVE:  ICU Vital Signs Last 24 Hrs  T(C): 36.3 (20 Dec 2023 05:38), Max: 36.3 (19 Dec 2023 21:30)  T(F): 97.4 (20 Dec 2023 05:38), Max: 97.4 (19 Dec 2023 21:30)  HR: 66 (20 Dec 2023 05:38) (66 - 98)  BP: 130/97 (20 Dec 2023 05:38) (99/74 - 130/97)  BP(mean): --  ABP: --  ABP(mean): --  RR: 17 (20 Dec 2023 05:38) (17 - 18)  SpO2: 100% (20 Dec 2023 05:38) (100% - 100%)    O2 Parameters below as of 20 Dec 2023 05:38  Patient On (Oxygen Delivery Method): room air            CAPILLARY BLOOD GLUCOSE      POCT Blood Glucose.: 94 mg/dL (19 Dec 2023 21:26)  POCT Blood Glucose.: 89 mg/dL (19 Dec 2023 18:06)  POCT Blood Glucose.: 83 mg/dL (19 Dec 2023 12:33)  POCT Blood Glucose.: 92 mg/dL (19 Dec 2023 08:43)    CAPILLARY BLOOD GLUCOSE      POCT Blood Glucose.: 94 mg/dL (19 Dec 2023 21:26)    I&O's Summary    Daily     Daily     PHYSICAL EXAMINATION:  General: Comfortable, no acute distress, cooperative with exam.  HEENT: PERRLA  Respiratory: CTAB, normal respiratory effort, no coughing, wheezes, crackles, or rales.  CV: RRR, S1S2, no murmurs, rubs or gallops. No JVD. Distal pulses intact.  Abdominal: Soft, nontender, nondistended, no rebound or guarding, normal bowel sounds.  Neurology: AOx3, no focal neuro defects, ROGERS x 4.  Extremities: No pitting edema, + Peripheral pulses.      LABS:                          9.5    3.10  )-----------( 120      ( 19 Dec 2023 07:20 )             30.2     12-19    139  |  102  |  12  ----------------------------<  100<H>  4.4   |  27  |  0.81    Ca    9.7      19 Dec 2023 07:20  Phos  3.2     12-19  Mg     1.70     12-19    TPro  6.7  /  Alb  4.1  /  TBili  0.6  /  DBili  x   /  AST  15  /  ALT  15  /  AlkPhos  120  12-19    LIVER FUNCTIONS - ( 19 Dec 2023 07:20 )  Alb: 4.1 g/dL / Pro: 6.7 g/dL / ALK PHOS: 120 U/L / ALT: 15 U/L / AST: 15 U/L / GGT: x                   Urinalysis Basic - ( 19 Dec 2023 07:20 )    Color: x / Appearance: x / SG: x / pH: x  Gluc: 100 mg/dL / Ketone: x  / Bili: x / Urobili: x   Blood: x / Protein: x / Nitrite: x   Leuk Esterase: x / RBC: x / WBC x   Sq Epi: x / Non Sq Epi: x / Bacteria: x       PATIENT: JACQUIE GRANDA, MRN: [id number]    CHIEF COMPLAINT: Patient is a 63y old  Female who presents with a chief complaint of pyelonephritis (19 Dec 2023 16:49)      INTERVAL HISTORY/OVERNIGHT EVENTS: No overnight events.       MEDICATIONS:  MEDICATIONS  (STANDING):  atorvastatin 40 milliGRAM(s) Oral at bedtime  cefTRIAXone   IVPB 1000 milliGRAM(s) IV Intermittent every 24 hours  chlorhexidine 2% Cloths 1 Application(s) Topical daily  dextrose 5%. 1000 milliLiter(s) (50 mL/Hr) IV Continuous <Continuous>  dextrose 5%. 1000 milliLiter(s) (100 mL/Hr) IV Continuous <Continuous>  dextrose 50% Injectable 25 Gram(s) IV Push once  dextrose 50% Injectable 25 Gram(s) IV Push once  dextrose 50% Injectable 12.5 Gram(s) IV Push once  enoxaparin Injectable 40 milliGRAM(s) SubCutaneous every 12 hours  glucagon  Injectable 1 milliGRAM(s) IntraMuscular once  influenza   Vaccine 0.5 milliLiter(s) IntraMuscular once  insulin lispro (ADMELOG) corrective regimen sliding scale   SubCutaneous at bedtime  insulin lispro (ADMELOG) corrective regimen sliding scale   SubCutaneous three times a day before meals  lactated ringers. 1000 milliLiter(s) (100 mL/Hr) IV Continuous <Continuous>  lidocaine   4% Patch 1 Patch Transdermal daily  losartan 50 milliGRAM(s) Oral daily  montelukast 10 milliGRAM(s) Oral daily  pantoprazole    Tablet 40 milliGRAM(s) Oral before breakfast    MEDICATIONS  (PRN):  acetaminophen     Tablet .. 650 milliGRAM(s) Oral every 6 hours PRN Moderate Pain (4 - 6), Severe Pain (7 - 10)  dextrose Oral Gel 15 Gram(s) Oral once PRN Blood Glucose LESS THAN 70 milliGRAM(s)/deciliter      ALLERGIES: Allergies    amoxicillin (Hives)    Intolerances        OBJECTIVE:  ICU Vital Signs Last 24 Hrs  T(C): 36.3 (20 Dec 2023 05:38), Max: 36.3 (19 Dec 2023 21:30)  T(F): 97.4 (20 Dec 2023 05:38), Max: 97.4 (19 Dec 2023 21:30)  HR: 66 (20 Dec 2023 05:38) (66 - 98)  BP: 130/97 (20 Dec 2023 05:38) (99/74 - 130/97)  BP(mean): --  ABP: --  ABP(mean): --  RR: 17 (20 Dec 2023 05:38) (17 - 18)  SpO2: 100% (20 Dec 2023 05:38) (100% - 100%)    O2 Parameters below as of 20 Dec 2023 05:38  Patient On (Oxygen Delivery Method): room air            CAPILLARY BLOOD GLUCOSE      POCT Blood Glucose.: 94 mg/dL (19 Dec 2023 21:26)  POCT Blood Glucose.: 89 mg/dL (19 Dec 2023 18:06)  POCT Blood Glucose.: 83 mg/dL (19 Dec 2023 12:33)  POCT Blood Glucose.: 92 mg/dL (19 Dec 2023 08:43)    CAPILLARY BLOOD GLUCOSE      POCT Blood Glucose.: 94 mg/dL (19 Dec 2023 21:26)    I&O's Summary    Daily     Daily     PHYSICAL EXAMINATION:  General: Comfortable, no acute distress, cooperative with exam.  HEENT: PERRLA  Respiratory: CTAB, normal respiratory effort, no coughing, wheezes, crackles, or rales.  CV: RRR, S1S2, no murmurs, rubs or gallops. No JVD. Distal pulses intact.  Abdominal: Soft, nontender, nondistended, no rebound or guarding, normal bowel sounds.  Neurology: AOx3, no focal neuro defects, ROGERS x 4.  Extremities: No pitting edema, + Peripheral pulses.      LABS:                          9.5    3.10  )-----------( 120      ( 19 Dec 2023 07:20 )             30.2     12-19    139  |  102  |  12  ----------------------------<  100<H>  4.4   |  27  |  0.81    Ca    9.7      19 Dec 2023 07:20  Phos  3.2     12-19  Mg     1.70     12-19    TPro  6.7  /  Alb  4.1  /  TBili  0.6  /  DBili  x   /  AST  15  /  ALT  15  /  AlkPhos  120  12-19    LIVER FUNCTIONS - ( 19 Dec 2023 07:20 )  Alb: 4.1 g/dL / Pro: 6.7 g/dL / ALK PHOS: 120 U/L / ALT: 15 U/L / AST: 15 U/L / GGT: x                   Urinalysis Basic - ( 19 Dec 2023 07:20 )    Color: x / Appearance: x / SG: x / pH: x  Gluc: 100 mg/dL / Ketone: x  / Bili: x / Urobili: x   Blood: x / Protein: x / Nitrite: x   Leuk Esterase: x / RBC: x / WBC x   Sq Epi: x / Non Sq Epi: x / Bacteria: x       PATIENT: JACQUIE GRANDA, MRN: [id number]    CHIEF COMPLAINT: Patient is a 63y old  Female who presents with a chief complaint of pyelonephritis (19 Dec 2023 16:49)      INTERVAL HISTORY/OVERNIGHT EVENTS: No overnight events. Still with back pain on L side, not improving very much with tylenol or ibuprofen. States that morphine did help, but would come back as soon as morphine wears off. Pain is actually more intense, but quality is the same sharp pain. Had 5 episodes of diarrhea overnight, loose. No fevers/chills      MEDICATIONS:  MEDICATIONS  (STANDING):  atorvastatin 40 milliGRAM(s) Oral at bedtime  cefTRIAXone   IVPB 1000 milliGRAM(s) IV Intermittent every 24 hours  chlorhexidine 2% Cloths 1 Application(s) Topical daily  dextrose 5%. 1000 milliLiter(s) (50 mL/Hr) IV Continuous <Continuous>  dextrose 5%. 1000 milliLiter(s) (100 mL/Hr) IV Continuous <Continuous>  dextrose 50% Injectable 25 Gram(s) IV Push once  dextrose 50% Injectable 25 Gram(s) IV Push once  dextrose 50% Injectable 12.5 Gram(s) IV Push once  enoxaparin Injectable 40 milliGRAM(s) SubCutaneous every 12 hours  glucagon  Injectable 1 milliGRAM(s) IntraMuscular once  influenza   Vaccine 0.5 milliLiter(s) IntraMuscular once  insulin lispro (ADMELOG) corrective regimen sliding scale   SubCutaneous at bedtime  insulin lispro (ADMELOG) corrective regimen sliding scale   SubCutaneous three times a day before meals  lactated ringers. 1000 milliLiter(s) (100 mL/Hr) IV Continuous <Continuous>  lidocaine   4% Patch 1 Patch Transdermal daily  losartan 50 milliGRAM(s) Oral daily  montelukast 10 milliGRAM(s) Oral daily  pantoprazole    Tablet 40 milliGRAM(s) Oral before breakfast    MEDICATIONS  (PRN):  acetaminophen     Tablet .. 650 milliGRAM(s) Oral every 6 hours PRN Moderate Pain (4 - 6), Severe Pain (7 - 10)  dextrose Oral Gel 15 Gram(s) Oral once PRN Blood Glucose LESS THAN 70 milliGRAM(s)/deciliter      ALLERGIES: Allergies    amoxicillin (Hives)    Intolerances        OBJECTIVE:  ICU Vital Signs Last 24 Hrs  T(C): 36.3 (20 Dec 2023 05:38), Max: 36.3 (19 Dec 2023 21:30)  T(F): 97.4 (20 Dec 2023 05:38), Max: 97.4 (19 Dec 2023 21:30)  HR: 66 (20 Dec 2023 05:38) (66 - 98)  BP: 130/97 (20 Dec 2023 05:38) (99/74 - 130/97)  BP(mean): --  ABP: --  ABP(mean): --  RR: 17 (20 Dec 2023 05:38) (17 - 18)  SpO2: 100% (20 Dec 2023 05:38) (100% - 100%)    O2 Parameters below as of 20 Dec 2023 05:38  Patient On (Oxygen Delivery Method): room air            CAPILLARY BLOOD GLUCOSE      POCT Blood Glucose.: 94 mg/dL (19 Dec 2023 21:26)  POCT Blood Glucose.: 89 mg/dL (19 Dec 2023 18:06)  POCT Blood Glucose.: 83 mg/dL (19 Dec 2023 12:33)  POCT Blood Glucose.: 92 mg/dL (19 Dec 2023 08:43)    CAPILLARY BLOOD GLUCOSE      POCT Blood Glucose.: 94 mg/dL (19 Dec 2023 21:26)    I&O's Summary    Daily     Daily     PHYSICAL EXAMINATION:  General: Comfortable, no acute distress, cooperative with exam.  Respiratory: CTAB, normal respiratory effort, no coughing, wheezes, crackles, or rales.  CV: RRR, S1S2, no murmurs, rubs or gallops.   Abdominal: Soft, nontender, nondistended, no rebound or guarding  Back: L CVA tenderness, no midline tenderness, no R CVA tenderness  Neurology: no focal neuro defects, ROGERS x 4.  Extremities: No pitting edema      LABS:                          9.5    3.10  )-----------( 120      ( 19 Dec 2023 07:20 )             30.2     12-19    139  |  102  |  12  ----------------------------<  100<H>  4.4   |  27  |  0.81    Ca    9.7      19 Dec 2023 07:20  Phos  3.2     12-19  Mg     1.70     12-19    TPro  6.7  /  Alb  4.1  /  TBili  0.6  /  DBili  x   /  AST  15  /  ALT  15  /  AlkPhos  120  12-19    LIVER FUNCTIONS - ( 19 Dec 2023 07:20 )  Alb: 4.1 g/dL / Pro: 6.7 g/dL / ALK PHOS: 120 U/L / ALT: 15 U/L / AST: 15 U/L / GGT: x                   Urinalysis Basic - ( 19 Dec 2023 07:20 )    Color: x / Appearance: x / SG: x / pH: x  Gluc: 100 mg/dL / Ketone: x  / Bili: x / Urobili: x   Blood: x / Protein: x / Nitrite: x   Leuk Esterase: x / RBC: x / WBC x   Sq Epi: x / Non Sq Epi: x / Bacteria: x       PATIENT: JACQUIE GRANDA, MRN: [id number]    CHIEF COMPLAINT: Patient is a 63y old  Female who presents with a chief complaint of pyelonephritis (19 Dec 2023 16:49)      INTERVAL HISTORY/OVERNIGHT EVENTS: No overnight events. Still with back pain on L side, not improving very much with tylenol or ibuprofen. States that morphine did help, but would come back as soon as morphine wears off. Pain is actually more intense, but quality is the same sharp pain. Had 5 episodes of diarrhea overnight, loose. No fevers/chills      MEDICATIONS:  MEDICATIONS  (STANDING):  atorvastatin 40 milliGRAM(s) Oral at bedtime  cefTRIAXone   IVPB 1000 milliGRAM(s) IV Intermittent every 24 hours  chlorhexidine 2% Cloths 1 Application(s) Topical daily  dextrose 5%. 1000 milliLiter(s) (50 mL/Hr) IV Continuous <Continuous>  dextrose 5%. 1000 milliLiter(s) (100 mL/Hr) IV Continuous <Continuous>  dextrose 50% Injectable 25 Gram(s) IV Push once  dextrose 50% Injectable 25 Gram(s) IV Push once  dextrose 50% Injectable 12.5 Gram(s) IV Push once  enoxaparin Injectable 40 milliGRAM(s) SubCutaneous every 12 hours  glucagon  Injectable 1 milliGRAM(s) IntraMuscular once  influenza   Vaccine 0.5 milliLiter(s) IntraMuscular once  insulin lispro (ADMELOG) corrective regimen sliding scale   SubCutaneous at bedtime  insulin lispro (ADMELOG) corrective regimen sliding scale   SubCutaneous three times a day before meals  lactated ringers. 1000 milliLiter(s) (100 mL/Hr) IV Continuous <Continuous>  lidocaine   4% Patch 1 Patch Transdermal daily  losartan 50 milliGRAM(s) Oral daily  montelukast 10 milliGRAM(s) Oral daily  pantoprazole    Tablet 40 milliGRAM(s) Oral before breakfast    MEDICATIONS  (PRN):  acetaminophen     Tablet .. 650 milliGRAM(s) Oral every 6 hours PRN Moderate Pain (4 - 6), Severe Pain (7 - 10)  dextrose Oral Gel 15 Gram(s) Oral once PRN Blood Glucose LESS THAN 70 milliGRAM(s)/deciliter      ALLERGIES: Allergies    amoxicillin (Hives)    Intolerances        OBJECTIVE:  ICU Vital Signs Last 24 Hrs  T(C): 36.3 (20 Dec 2023 05:38), Max: 36.3 (19 Dec 2023 21:30)  T(F): 97.4 (20 Dec 2023 05:38), Max: 97.4 (19 Dec 2023 21:30)  HR: 66 (20 Dec 2023 05:38) (66 - 98)  BP: 130/97 (20 Dec 2023 05:38) (99/74 - 130/97)  BP(mean): --  ABP: --  ABP(mean): --  RR: 17 (20 Dec 2023 05:38) (17 - 18)  SpO2: 100% (20 Dec 2023 05:38) (100% - 100%)    O2 Parameters below as of 20 Dec 2023 05:38  Patient On (Oxygen Delivery Method): room air            CAPILLARY BLOOD GLUCOSE      POCT Blood Glucose.: 94 mg/dL (19 Dec 2023 21:26)  POCT Blood Glucose.: 89 mg/dL (19 Dec 2023 18:06)  POCT Blood Glucose.: 83 mg/dL (19 Dec 2023 12:33)  POCT Blood Glucose.: 92 mg/dL (19 Dec 2023 08:43)    CAPILLARY BLOOD GLUCOSE      POCT Blood Glucose.: 94 mg/dL (19 Dec 2023 21:26)    I&O's Summary    Daily     Daily     PHYSICAL EXAMINATION:  General: Comfortable, no acute distress, cooperative with exam.  Respiratory: CTAB, normal respiratory effort, no coughing, wheezes, crackles, or rales.  CV: RRR, S1S2, no murmurs, rubs or gallops.   Abdominal: Soft, nontender, nondistended, no rebound or guarding  Back: L CVA tenderness, no midline tenderness, no R CVA tenderness  Neurology: no focal neuro defects, ROGERS x 4.  Extremities: No pitting edema      LABS:                          9.5    3.10  )-----------( 120      ( 19 Dec 2023 07:20 )             30.2     12-19    139  |  102  |  12  ----------------------------<  100<H>  4.4   |  27  |  0.81    Ca    9.7      19 Dec 2023 07:20  Phos  3.2     12-19  Mg     1.70     12-19    TPro  6.7  /  Alb  4.1  /  TBili  0.6  /  DBili  x   /  AST  15  /  ALT  15  /  AlkPhos  120  12-19    LIVER FUNCTIONS - ( 19 Dec 2023 07:20 )  Alb: 4.1 g/dL / Pro: 6.7 g/dL / ALK PHOS: 120 U/L / ALT: 15 U/L / AST: 15 U/L / GGT: x                   Urinalysis Basic - ( 19 Dec 2023 07:20 )    Color: x / Appearance: x / SG: x / pH: x  Gluc: 100 mg/dL / Ketone: x  / Bili: x / Urobili: x   Blood: x / Protein: x / Nitrite: x   Leuk Esterase: x / RBC: x / WBC x   Sq Epi: x / Non Sq Epi: x / Bacteria: x

## 2023-12-20 NOTE — PROVIDER CONTACT NOTE (OTHER) - ACTION/TREATMENT ORDERED:
MD notified and made aware. No new interventions ordered at this time.
MD Simms made aware. Nursing care to continue

## 2023-12-20 NOTE — PROGRESS NOTE ADULT - PROBLEM SELECTOR PLAN 4
Followed by Dr. Blankenship and on chemo, last dose on 12/12.  - Patient on neoadjuvant chemo at this time with plan for lumpectomy with Dr. Blankenship.

## 2023-12-20 NOTE — PROVIDER CONTACT NOTE (OTHER) - ASSESSMENT
Patient assessed. No signs or symptoms of acute distress noted.
Patient refusing blood draw this morning Patient refusing blood draw this morning

## 2023-12-20 NOTE — PROGRESS NOTE ADULT - PROBLEM SELECTOR PLAN 3
Patient with pancytopenia after chemo on 12/12. Denies fevers and no signs of systemic symptoms of her infection at this time. ANC count lowering at this time. Now mild neutropenia.  - Continue to monitor CBC, bandemia at this time. Stable  - Hold on inpatient chemo while being treated for infection

## 2023-12-20 NOTE — DISCHARGE NOTE NURSING/CASE MANAGEMENT/SOCIAL WORK - PATIENT PORTAL LINK FT
You can access the FollowMyHealth Patient Portal offered by [facility] by registering at the following website: [email or web address]. By joining [facility]’s FollowMyHealth portal, you will also be able to view your health information using other applications (apps) compatible with our system. You can access the FollowMyHealth Patient Portal offered by [facility] by registering at the following website: [email or web address]. By joining [facility]’s FollowMyHealth portal, you will also be able to view your health information using other applications (apps) compatible with our system.

## 2023-12-20 NOTE — CHART NOTE - NSCHARTNOTEFT_GEN_A_CORE
This report was requested by: Reynaldo Ridley | Reference #: [id number]    Practitioner Count: 2  Pharmacy Count: 2  Current Opioid Prescriptions: 0  Current Benzodiazepine Prescriptions: 0  Current Stimulant Prescriptions: 0      Patient Demographic Information (PDI)       PDI	First Name	Last Name	Birth Date	Gender	Street Address	[facility]  A	Lavonne Dominguez	1960	Female	[address]	[place]	[address]  B	Lavonne Dominguez	1960	Female	[address]	[place]	[address]    Prescription Information      PDI Filter:    PDI	Current Rx	Drug Type	Rx Written	Rx Dispensed	Drug	Quantity	Days Supply	Prescriber Name	Prescriber SHAI #	Payment Method	Dispenser  A	N	O	10/10/2023	10/10/2023	diphenoxylate-atropine 2.5-0.025 mg tablet	120	30	Magdiel Cole M	[id number]	Insurance	[facility]  B	N	O	11/22/2023	11/22/2023	acetaminophen-cod #3 tablet	45	7	Cathy Morgan	[id number]	Insurance	[facility] [id number] This report was requested by: Reynaldo Ridley | Reference #: [id number]    Practitioner Count: 2  Pharmacy Count: 2  Current Opioid Prescriptions: 0  Current Benzodiazepine Prescriptions: 0  Current Stimulant Prescriptions: 0      Patient Demographic Information (PDI)       PDI	First Name	Last Name	Birth Date	Gender	Street Address	[facility]  A	Lavonne Dominguez	1960	Female	[address]	[place]	[address]  B	Lavonne Dominguez	1960	Female	[address]	[place]	[address]    Prescription Information      PDI Filter:    PDI	Current Rx	Drug Type	Rx Written	Rx Dispensed	Drug	Quantity	Days Supply	Prescriber Name	Prescriber SHAI #	Payment Method	Dispenser  A	N	O	10/10/2023	10/10/2023	diphenoxylate-atropine 2.5-0.025 mg tablet	120	30	Magdiel Cole M	[id number]	Insurance	[facility]  B	N	O	11/22/2023	11/22/2023	acetaminophen-cod #3 tablet	45	7	Cathy Morgan	[id number]	Insurance	[facility] [id number]

## 2023-12-20 NOTE — DISCHARGE NOTE NURSING/CASE MANAGEMENT/SOCIAL WORK - NSDCPEFALRISK_GEN_ALL_CORE
For information on Fall & Injury Prevention, visit: [email or web address] OR  [email or web address] OR  https://www.cdc.gov/steadi/patient.html For information on Fall & Injury Prevention, visit: [email or web address] OR  [email or web address] OR  https://www.cdc.gov/steadi/patient.html

## 2023-12-20 NOTE — PROGRESS NOTE ADULT - ASSESSMENT
63F hx of DM2, HLD, HTN, breast ca on chemotherapy last chemo on 12/12 following with Dr. Blankenship at [facility] here for pyelonephritis iso pancytopenia after chemo on CTX 63F hx of DM2, HLD, HTN, breast ca on chemotherapy last chemo on 12/12 following with Dr. Blankenship at [facility] here for pyelonephritis iso pancytopenia after chemo on CTX

## 2023-12-20 NOTE — PROGRESS NOTE ADULT - PROBLEM/PLAN-2
Psychotherapy Provided: Individual Psychotherapy 45 minutes     Length of time in session: 45 minutes, follow up in 2 week    Goals addressed in session: Goal 1     Pain:      moderate to severe-- 5-- pain re: fibromyalgia/migraine    Current suicide risk : Low     DATA: Met with Cruz for scheduled individual session  "I already had my appointment for the medical marijuana card " Cruz spent some time talking about the process of getting her evaluation for her medical marijuana card  Mikaela Sandhu is now waiting for the actual card  She will then set up an appointment at the dispensary to determine what products are suggested for her  Cruz discussed her overall levels of anxiety  She described one incident regarding her mother parking in a handicapped parking spot when she felt that she should not have been parking there  Her mother brought up her level of anxiety with the doctor  Her PCP has prescribed xanax for her, until she is able to get in to see Dr Grissom Serum  She is not currently taking the klonopin or ativan  We discussed the importance of her not taking the other medications, as these are all the same family of medication  The clinician discussed her incidents of anxiety and how they impact her daily life  She states that this one incident lasted approximately 30 minutes (when they left the appointment)  Mikaela Sandhu identifies that her mother advocates for her, due to her understanding of anxiety  She states that her mother has a good understanding of anxiety  Mikaela Sandhu states that her best friend knows that she has anxiety; however, she does not have as strong of an understanding about it as her mother  Cruz reports she has been experiencing some increased physical symptoms of her anxiety (e g , chest pain)  Clinician inquired about physical health issues  Cruz reports that she has had an EKG in the past and feels pretty certain that the physical sensations are anxiety-related 
Cruz discussed some of her physical health issues (e g , migraines and fibromyalgia)  She reports some cognitive dysfunction when she is experiencing migraines (e g , she "forgot how to use the ice machine" earlier today)  Cruz reports she was again denied for her long-term disability from her previous employment  She states that the reports that were issued to explain the decision were very contradictory  She states that she was also denied for [facility] Disability  She states that she is getting a  to help with the appeal process  Oscar Torres states she really does want to work; however, she is unable to maintain a job at this time--due to her levels of pain and physical discomfort  Oscar Torres states she will be helping some friends with dog-sitting  Cruz and her friend have been spending time together  She talked about being present for her friend's daughter's first day of school this year  ASSESSMENT: Oscar Torres presents with a primarily euthymic mood  Her affect is full range and congruent with her mood and the content of the session  Oscar Torres exhibits a positive therapeutic rapport with this clinician  Cruz appears to have improving insight regarding things that help her to manage her anxiety in a healthy and effective manner  Oscar Torres continues to exhibit willingness to work on treatment goals and objectives  PLAN: Oscar Torres will return in two weeks for the next session  Between sessions, Oscar Torres will continue to use her mindfulness-based coping skills and will report back during the next session re: successes and barriers  At the next session, this clinician will use mindfulness-based strategies, CBT techniques, and client-centered therapy to address her levels of anxiety, in an effort to assist Cruz with meeting treatment goals       Behavioral Health Treatment Plan [facility]: Diagnosis and Treatment Plan explained to Em Edwards relates
understanding diagnosis and is agreeable to Treatment Plan   Yes
DISPLAY PLAN FREE TEXT

## 2023-12-20 NOTE — PROGRESS NOTE ADULT - PROBLEM SELECTOR PLAN 1
UA with moderate LE and bacteria. L CVA tenderness and urinary symptoms. Although, CT Abdomen Pelvis unrevealing. Patient was started on CTX, given fluids, and pain regimen, and has been improving. Admitted due to pancytopenia iso chemo  - Hold on maintenance fluids at this time, cap refill normal  - Continue CTX for total of 5-7 days, transition to oral when urine culture sensitivities result  - F/u bcx UA with moderate LE and bacteria. L CVA tenderness and urinary symptoms. Although, CT Abdomen Pelvis unrevealing. Patient was started on CTX, given fluids, and pain regimen, and was initially improving. Admitted due to pancytopenia iso chemo. However, L CVA tenderness is persistent and more intense.  - Cap refill prolonged, will likely need additional IV fluids, but patient also not enjoying inpatient meals  - Continue CTX for total of 5-7 days, transition to oral when urine culture sensitivities result  - F/u bcx  - No hematuria, lower likelihood of renal stone, but will trial tamsulosin to see if pain improves. Kidney/bladder ultrasound? UA with moderate LE and bacteria. L CVA tenderness and urinary symptoms. Although, CT Abdomen Pelvis unrevealing. Patient was started on CTX, given fluids, and pain regimen, and was initially improving. Admitted due to pancytopenia iso chemo. However, L CVA tenderness is persistent and more intense.  - Cap refill prolonged, will likely need additional IV fluids, but patient also not enjoying inpatient meals  - Continue CTX for total of 5-7 days, transition to oral when urine culture sensitivities result  - F/u bcx  - No hematuria, lower likelihood of renal stone, more likely musculoskeletal pain given worsening with movement. Will trial ibuprofen and cyclobenzaprine

## 2023-12-20 NOTE — PROGRESS NOTE ADULT - PROBLEM SELECTOR PLAN 5
DM2, has been off insulin for a month. Prescribed 15U Lantus at home, but does not use because her BGs are 80s-90s.  - LDSSI  - A1C 5.8

## 2023-12-22 LAB
CULTURE RESULTS: SIGNIFICANT CHANGE UP
SPECIMEN SOURCE: SIGNIFICANT CHANGE UP

## 2024-02-05 ENCOUNTER — APPOINTMENT (OUTPATIENT)
Dept: ORTHOPEDIC SURGERY | Facility: CLINIC | Age: 64
End: 2024-02-05
Payer: COMMERCIAL

## 2024-02-05 VITALS — WEIGHT: 271 LBS | BODY MASS INDEX: 46.26 KG/M2 | HEIGHT: 64 IN

## 2024-02-05 DIAGNOSIS — M76.51 PATELLAR TENDINITIS, RIGHT KNEE: ICD-10-CM

## 2024-02-05 DIAGNOSIS — M25.569 PAIN IN UNSPECIFIED KNEE: ICD-10-CM

## 2024-02-05 PROCEDURE — 73564 X-RAY EXAM KNEE 4 OR MORE: CPT | Mod: RT

## 2024-02-05 PROCEDURE — 99213 OFFICE O/P EST LOW 20 MIN: CPT

## 2024-02-05 NOTE — PHYSICAL EXAM
[5___] : hamstring 5[unfilled]/5 [Right] : right knee [All Views] : anteroposterior, lateral, skyline, and anteroposterior standing [Degenerative change] : Degenerative change [Negative] : negative Nat's [] : patient ambulates with assistive device [[id number]] : flexion 120 degrees [de-identified] : extension 0 degrees

## 2024-02-05 NOTE — DISCUSSION/SUMMARY
[de-identified] : modify activities use elastic sleeve try OTC meds ice as needed try topical lidocaine reviewed current medications used by this patient 02/05/2024  RE:  JACQUIE JESUS ALBERTO   Acct #- [id number]  Attention:  Nurse Reviewer /Medical Director  I am writing this letter as a medical necessity for HA euflexxa R knee Patient has tried analgesics, non-steroid anti-inflammatory agents,  physical therapy, hot or cold compresses,injections of corticosteroids, etc)  which in combination or by themselves has not worked. Based on my patient's condition, I strongly believe that the Hyaluronic aid injections is medically needed.   Thank you for your time and consideration.

## 2024-02-05 NOTE — HISTORY OF PRESENT ILLNESS
[de-identified] : 63 year old female with pain in the right knee, symptoms started yesterday, no specific injury. She has pain anterior aspect of the knee, worse with stairs, walking, getting up from a seated position. Feels a little better today. No mechanical symptoms. She just completed chemo for breast Ca, scheduled for a mastectomy this Wednesday. ON no meds [FreeTextEntry5] : Patient is here for right knee pain that began yesterday, not due to injury. Swelling. Pain is anterior. Denies clicking/buckling/locking. Worsens with bending/walking. No prior injury. No formal treatment. Tried Tylenol.

## 2024-02-06 RX ORDER — HYALURONATE SODIUM 30 MG/2 ML
30 SYRINGE (ML) INTRAARTICULAR
Qty: 4 | Refills: 0 | Status: ACTIVE | COMMUNITY
Start: 2022-09-09 | End: 1900-01-01

## 2024-02-26 ENCOUNTER — TRANSCRIPTION ENCOUNTER (OUTPATIENT)
Age: 64
End: 2024-02-26

## 2024-04-12 ENCOUNTER — APPOINTMENT (OUTPATIENT)
Dept: ORTHOPEDIC SURGERY | Facility: CLINIC | Age: 64
End: 2024-04-12
Payer: MEDICAID

## 2024-04-12 PROCEDURE — 20610 DRAIN/INJ JOINT/BURSA W/O US: CPT | Mod: RT

## 2024-04-12 PROCEDURE — 99024 POSTOP FOLLOW-UP VISIT: CPT

## 2024-04-12 NOTE — DISCUSSION/SUMMARY
[de-identified] :   - We discussed their diagnosis and treatment options at length including the risks and benefits of both surgical treatment with a knee replacement and non-surgical options.  - We will continue conservative treatment with activity modification, PT, icing, weight loss, and anti-inflammatory medications.  - The patient was provided with a PT prescription to work on ROM, hip ER/abductors strengthening, quad/hamstring stretches and strengthening, and other exercises   - The patient was advised to let pain guide the gradual advancement of activities.   - We also discussed the possible of a corticosteroid injection in order to help decrease inflammation and pain so that they can perform better therapy.  - The risks, benefits, and alternatives to corticosteroid injection were reviewed with the patient and they wished to proceed with this treatment course.   - Follow up as needed in 6 weeks to re-evaluate, if no improvement we spoke about possibility of viscosupplementation injections

## 2024-04-12 NOTE — DATA REVIEWED
[FreeTextEntry1] : Right  X-Ray Examination of the KNEE (4 views): medial and patellofemoral degenerate changes.

## 2024-04-12 NOTE — IMAGING
[de-identified] : RIGHT KNEE  Inspection:  mild effusion  Palpation: medial joint line tenderness, anterior tenderness  Knee Range of Motion:  3-125   Strength: 5/5 Quadriceps strength, 5/5 Hamstring strength  Neurological: light touch is intact throughout  Ligament Stability and Special Tests:   McMurrays: neg  Lachman: neg  Pivot Shift: neg  Posterior Drawer: neg  Valgus: neg  Varus: neg  Patella Apprehension: neg  Patella Maltracking: neg

## 2024-04-12 NOTE — HISTORY OF PRESENT ILLNESS
[de-identified] : Date of Injury/Onset:  Patient is here for evaluation of right knee. Patient has been seen by Dr. Dumont for right knee pain. Patient has had right knee pain for the past 6 months. Patient has pain anterior aspect of knee and pain is worse with stairs, standing up after prolonged sitting. Patient was authorized for right knee Orthovisc. Patient notes no prior injury.  Pain:    At Rest: 3/10   With Activity:  6/10   Mechanism of injury:  No injury This is [not] a Work Related Injury being treated under Worker's Compensation.  This is [not] an athletic injury occurring associated with an interscholastic or organized sports team.  Quality of symptoms:  Aching Improves with:  Tylenol Worse with:  Stairs, walking, standing up Prior treatment:  No treatment Prior Imaging:  XR Reports Available For Review Today: Available today Out of work/sport: [Yes], since [date of injury]  School/Sport/Position/Occupation:_  Personal goal:  Additional Information: [None]

## 2024-04-12 NOTE — PROCEDURE
[FreeTextEntry1] : Orthovisc #1 [FreeTextEntry2] : symptomatic R knee OA [FreeTextEntry3] : The risks, benefits, and alternatives to viscosupplementation injection were reviewed with the patient. Risks outlined include but are not limited to infection, sepsis, bleeding, scarring, temporary increase in pain, syncopal episode, failure to resolve symptoms, symptoms recurrence, allergic reaction, flare reaction, pseudoseptic reaction.  Patient understood the risks and asked to proceed with this treatment course.    Large joint injection was performed of the right knee. The indication for this procedure was pain, inflammation and x-ray evidence of Osteoarthritis on this or prior visit. The site was prepped with alcohol, betadine, ethyl chloride sprayed topically and sterile technique used. An injection of Orthovisc, series #1  was used.   Patient tolerated procedure well. Patient was advised to call if redness, pain or fever occur and apply ice for 15 minutes out of every hour for the next 12-24 hours as tolerated.     Patient has tried OTC's including aspirin, Ibuprofen, Aleve, etc or prescription NSAIDS, and/or exercises at home and/or physical therapy without satisfactory response, patient had decreased mobility in the joint and the risks benefits, and alternatives have been discussed, and verbal consent was obtained.

## 2024-04-16 NOTE — ED PROVIDER NOTE - INPATIENT RESIDENT/ACP NOTIFIED DATE
2024    TELEHEALTH EVALUATION -- Audio/Visual    HPI:    Shun Hunt (:  1970) has requested an audio/video evaluation for the following concern(s):    Patient had a CT calcium score obtained this week  Results are the following:    FINDINGS:  LEFT MAIN: 8     LEFT ANTERIOR DESCENDIN     CIRCUMFLEX: 5     RIGHT CORONARY ARTERY: Zero (0).     POSTERIOR DESCENDING ARTERY: Zero (0)     TOTAL AGATSTON CALCIUM SCORE: 479     EXTRACARDIAC STRUCTURES: No evidence of mediastinal or hilar lymphadenopathy.  No pericardial effusion.  No cardiomegaly.  No evidence of acute process in  the lungs.  A pneumatocele is present in the right lower lobe.  There is  bibasilar atelectasis.  Limited images of the upper abdomen are grossly  unremarkable.  Visualized osseous structures demonstrate age related  degenerative changes without acute abnormality.     IMPRESSION:  Total calcium score of 479 is within the 90th percentile for the patient's  age of 54 y/o .     No incidental clinically important extracardiac CT findings.    Patient has medical history of HTN and Hyperlipidemia, family history of CAD.      Today, he denied chest pain, sob, n, v, or diarrhea.       Review of Systems   Constitutional:  Negative for activity change and fatigue.   Respiratory:  Negative for cough and shortness of breath.    Cardiovascular:  Negative for chest pain, palpitations and leg swelling.   Gastrointestinal:  Negative for abdominal pain, diarrhea, nausea and vomiting.       Prior to Visit Medications    Medication Sig Taking? Authorizing Provider   lisinopril (PRINIVIL;ZESTRIL) 10 MG tablet Take 1 tablet by mouth daily Yes Alex Daugherty DO   atorvastatin (LIPITOR) 20 MG tablet Take 1 tablet by mouth daily Yes Alex Daugherty DO       Social History     Tobacco Use    Smoking status: Never    Smokeless tobacco: Never   Vaping Use    Vaping Use: Never used   Substance Use Topics    Alcohol use: No    Drug use: No    
25-Jan-2022 15:29

## 2024-04-22 ENCOUNTER — APPOINTMENT (OUTPATIENT)
Dept: ORTHOPEDIC SURGERY | Facility: CLINIC | Age: 64
End: 2024-04-22
Payer: MEDICAID

## 2024-04-22 PROCEDURE — 99024 POSTOP FOLLOW-UP VISIT: CPT

## 2024-04-22 PROCEDURE — 20610 DRAIN/INJ JOINT/BURSA W/O US: CPT | Mod: RT

## 2024-04-22 NOTE — PROCEDURE
[FreeTextEntry1] : Orthovisc #2 [FreeTextEntry2] : symptomatic R knee OA [FreeTextEntry3] : The risks, benefits, and alternatives to viscosupplementation injection were reviewed with the patient. Risks outlined include but are not limited to infection, sepsis, bleeding, scarring, temporary increase in pain, syncopal episode, failure to resolve symptoms, symptoms recurrence, allergic reaction, flare reaction, pseudoseptic reaction.  Patient understood the risks and asked to proceed with this treatment course.    Large joint injection was performed of the right knee. The indication for this procedure was pain, inflammation and x-ray evidence of Osteoarthritis on this or prior visit. The site was prepped with alcohol, betadine, ethyl chloride sprayed topically and sterile technique used. An injection of Orthovisc, series #2  was used.   Patient tolerated procedure well. Patient was advised to call if redness, pain or fever occur and apply ice for 15 minutes out of every hour for the next 12-24 hours as tolerated.     Patient has tried OTC's including aspirin, Ibuprofen, Aleve, etc or prescription NSAIDS, and/or exercises at home and/or physical therapy without satisfactory response, patient had decreased mobility in the joint and the risks benefits, and alternatives have been discussed, and verbal consent was obtained.

## 2024-04-22 NOTE — DISCUSSION/SUMMARY
[de-identified] :   - We discussed their diagnosis and treatment options at length including the risks and benefits of both surgical treatment with a knee replacement and non-surgical options.  - We will continue conservative treatment with activity modification, PT, icing, weight loss, and anti-inflammatory medications.  - The patient was provided with a PT prescription to work on ROM, hip ER/abductors strengthening, quad/hamstring stretches and strengthening, and other exercises   - The patient was advised to let pain guide the gradual advancement of activities.   - We also discussed the possible of a corticosteroid injection in order to help decrease inflammation and pain so that they can perform better therapy.  - The risks, benefits, and alternatives to corticosteroid injection were reviewed with the patient and they wished to proceed with this treatment course.   - Follow up as needed in 6 weeks to re-evaluate, if no improvement we spoke about possibility of viscosupplementation injections *** pt here for orthovisc #2 tolerated injection well mild improvement thus far has not started PT or HEP will plan for CSI + orthovisc at 4th injection

## 2024-04-22 NOTE — IMAGING
[de-identified] : RIGHT KNEE  Inspection:  mild effusion  Palpation: medial joint line tenderness, anterior tenderness  Knee Range of Motion:  3-125   Strength: 5/5 Quadriceps strength, 5/5 Hamstring strength  Neurological: light touch is intact throughout  Ligament Stability and Special Tests:   McMurrays: neg  Lachman: neg  Pivot Shift: neg  Posterior Drawer: neg  Valgus: neg  Varus: neg  Patella Apprehension: neg  Patella Maltracking: neg

## 2024-04-22 NOTE — HISTORY OF PRESENT ILLNESS
[de-identified] : Date of Injury/Onset:  Patient is here for evaluation of right knee. Patient has been seen by Dr. Dumont for right knee pain. Patient has had right knee pain for the past 6 months. Patient has pain anterior aspect of knee and pain is worse with stairs, standing up after prolonged sitting. Patient was authorized for right knee Orthovisc. Patient notes no prior injury.  Pain:    At Rest: 3/10   With Activity:  6/10   Mechanism of injury:  No injury This is [not] a Work Related Injury being treated under Worker's Compensation.  This is [not] an athletic injury occurring associated with an interscholastic or organized sports team.  Quality of symptoms:  Aching Improves with:  Tylenol Worse with:  Stairs, walking, standing up Prior treatment:  No treatment Prior Imaging:  XR Reports Available For Review Today: Available today Out of work/sport: [Yes], since [date of injury]  School/Sport/Position/Occupation:_  Personal goal:  Additional Information: [None]    04/22/2024 JACQUIE 63 year F is here today to follow up on right knee and Orthovisc inj #2. Patient reports no changes in pain since last visit.

## 2024-05-03 ENCOUNTER — APPOINTMENT (OUTPATIENT)
Dept: ORTHOPEDIC SURGERY | Facility: CLINIC | Age: 64
End: 2024-05-03
Payer: MEDICAID

## 2024-05-03 DIAGNOSIS — M17.12 UNILATERAL PRIMARY OSTEOARTHRITIS, LEFT KNEE: ICD-10-CM

## 2024-05-03 PROCEDURE — 99024 POSTOP FOLLOW-UP VISIT: CPT

## 2024-05-03 PROCEDURE — 20610 DRAIN/INJ JOINT/BURSA W/O US: CPT | Mod: RT

## 2024-05-03 NOTE — HISTORY OF PRESENT ILLNESS
[de-identified] : Date of Injury/Onset:  Patient is here for evaluation of right knee. Patient has been seen by Dr. Dumont for right knee pain. Patient has had right knee pain for the past 6 months. Patient has pain anterior aspect of knee and pain is worse with stairs, standing up after prolonged sitting. Patient was authorized for right knee Orthovisc. Patient notes no prior injury.  Pain:    At Rest: 3/10   With Activity:  6/10   Mechanism of injury:  No injury This is [not] a Work Related Injury being treated under Worker's Compensation.  This is [not] an athletic injury occurring associated with an interscholastic or organized sports team.  Quality of symptoms:  Aching Improves with:  Tylenol Worse with:  Stairs, walking, standing up Prior treatment:  No treatment Prior Imaging:  XR Reports Available For Review Today: Available today Out of work/sport: [Yes], since [date of injury]  School/Sport/Position/Occupation:_  Personal goal:  Additional Information: [None]    04/22/2024 JACQUIE 63 year F is here today to follow up on right knee and Orthovisc inj #2. Patient reports no changes in pain since last visit.  05/03/2024 JACQUIE 63 year F today for Orthovisc Inj#3. Patient notes pain will fluctuate day to day.

## 2024-05-03 NOTE — PROCEDURE
[FreeTextEntry1] : Orthovisc #3 [FreeTextEntry2] : symptomatic R knee OA [FreeTextEntry3] : The risks, benefits, and alternatives to viscosupplementation injection were reviewed with the patient. Risks outlined include but are not limited to infection, sepsis, bleeding, scarring, temporary increase in pain, syncopal episode, failure to resolve symptoms, symptoms recurrence, allergic reaction, flare reaction, pseudoseptic reaction.  Patient understood the risks and asked to proceed with this treatment course.    Large joint injection was performed of the right knee. The indication for this procedure was pain, inflammation and x-ray evidence of Osteoarthritis on this or prior visit. The site was prepped with alcohol, betadine, ethyl chloride sprayed topically and sterile technique used. An injection of Orthovisc, series #3  was used.   Patient tolerated procedure well. Patient was advised to call if redness, pain or fever occur and apply ice for 15 minutes out of every hour for the next 12-24 hours as tolerated.     Patient has tried OTC's including aspirin, Ibuprofen, Alieve, etc or prescription NSAIDS, and/or exercises at home and/or physical therapy without satisfactory response, patient had decreased mobility in the joint and the risks benefits, and alternatives have been discussed, and verbal consent was obtained.

## 2024-05-03 NOTE — IMAGING
[de-identified] : RIGHT KNEE  Inspection:  mild effusion  Palpation: medial joint line tenderness, anterior tenderness  Knee Range of Motion:  3-125   Strength: 5/5 Quadriceps strength, 5/5 Hamstring strength  Neurological: light touch is intact throughout  Ligament Stability and Special Tests:   McMurrays: neg  Lachman: neg  Pivot Shift: neg  Posterior Drawer: neg  Valgus: neg  Varus: neg  Patella Apprehension: neg  Patella Maltracking: neg

## 2024-05-03 NOTE — DISCUSSION/SUMMARY
[de-identified] :   - We discussed their diagnosis and treatment options at length including the risks and benefits of both surgical treatment with a knee replacement and non-surgical options.  - We will continue conservative treatment with activity modification, PT, icing, weight loss, and anti-inflammatory medications.  - The patient was provided with a PT prescription to work on ROM, hip ER/abductors strengthening, quad/hamstring stretches and strengthening, and other exercises   - The patient was advised to let pain guide the gradual advancement of activities.   - We also discussed the possible of a corticosteroid injection in order to help decrease inflammation and pain so that they can perform better therapy.  - The risks, benefits, and alternatives to corticosteroid injection were reviewed with the patient and they wished to proceed with this treatment course.   - Follow up as needed in 6 weeks to re-evaluate, if no improvement we spoke about possibility of viscosupplementation injections *** pt here for orthovisc #2 tolerated injection well mild improvement thus far has not started PT or HEP will plan for CSI + orthovisc at 4th injection *** pt here for orthovisc #3 tolerated injection well mild improvement thus far has not started PT or HEP will plan for CSI + orthovisc at 4th injection

## 2024-05-13 ENCOUNTER — APPOINTMENT (OUTPATIENT)
Dept: ORTHOPEDIC SURGERY | Facility: CLINIC | Age: 64
End: 2024-05-13
Payer: MEDICAID

## 2024-05-13 DIAGNOSIS — M17.11 UNILATERAL PRIMARY OSTEOARTHRITIS, RIGHT KNEE: ICD-10-CM

## 2024-05-13 PROCEDURE — 99024 POSTOP FOLLOW-UP VISIT: CPT

## 2024-05-13 PROCEDURE — 20610 DRAIN/INJ JOINT/BURSA W/O US: CPT | Mod: RT

## 2024-05-13 NOTE — HISTORY OF PRESENT ILLNESS
[de-identified] : Date of Injury/Onset:  Patient is here for evaluation of right knee. Patient has been seen by Dr. Dumont for right knee pain. Patient has had right knee pain for the past 6 months. Patient has pain anterior aspect of knee and pain is worse with stairs, standing up after prolonged sitting. Patient was authorized for right knee Orthovisc. Patient notes no prior injury.  Pain:    At Rest: 3/10   With Activity:  6/10   Mechanism of injury:  No injury This is [not] a Work Related Injury being treated under Worker's Compensation.  This is [not] an athletic injury occurring associated with an interscholastic or organized sports team.  Quality of symptoms:  Aching Improves with:  Tylenol Worse with:  Stairs, walking, standing up Prior treatment:  No treatment Prior Imaging:  XR Reports Available For Review Today: Available today Out of work/sport: [Yes], since [date of injury]  School/Sport/Position/Occupation:_  Personal goal:  Additional Information: [None]    04/22/2024 JACQUIE 63 year F is here today to follow up on right knee and Orthovisc inj #2. Patient reports no changes in pain since last visit.  05/03/2024 JACQUIE 63 year F today for Orthovisc Inj#3. Patient notes pain will fluctuate day to day.   05/13/2024 JACQUIE 63 year F is here today for Orthovisc inj #4. Patient notes moderate significant improvement since last visit.

## 2024-05-13 NOTE — DISCUSSION/SUMMARY
[de-identified] :   - We discussed their diagnosis and treatment options at length including the risks and benefits of both surgical treatment with a knee replacement and non-surgical options.  - We will continue conservative treatment with activity modification, PT, icing, weight loss, and anti-inflammatory medications.  - The patient was provided with a PT prescription to work on ROM, hip ER/abductors strengthening, quad/hamstring stretches and strengthening, and other exercises   - The patient was advised to let pain guide the gradual advancement of activities.   - We also discussed the possible of a corticosteroid injection in order to help decrease inflammation and pain so that they can perform better therapy.  - The risks, benefits, and alternatives to corticosteroid injection were reviewed with the patient and they wished to proceed with this treatment course.   - Follow up as needed in 6 weeks to re-evaluate, if no improvement we spoke about possibility of viscosupplementation injections *** pt here for orthovisc #2 tolerated injection well mild improvement thus far has not started PT or HEP will plan for CSI + orthovisc at 4th injection *** pt here for orthovisc #3 tolerated injection well mild improvement thus far has not started PT or HEP will plan for CSI + orthovisc at 4th injection *** pt here for orthovisc #4 + CSI R knee tolerated injection well mild improvement thus far has not started PT or HEP RTC 3 months  next visit: consider csi

## 2024-05-13 NOTE — PROCEDURE
[FreeTextEntry1] : Orthovisc #4 [FreeTextEntry2] : symptomatic R knee OA [FreeTextEntry3] : The risks, benefits, and alternatives to viscosupplementation injection were reviewed with the patient. Risks outlined include but are not limited to infection, sepsis, bleeding, scarring, temporary increase in pain, syncopal episode, failure to resolve symptoms, symptoms recurrence, allergic reaction, flare reaction, pseudoseptic reaction.  Patient understood the risks and asked to proceed with this treatment course.    Large joint injection was performed of the right knee. The indication for this procedure was pain, inflammation and x-ray evidence of Osteoarthritis on this or prior visit. The site was prepped with alcohol, betadine, ethyl chloride sprayed topically and sterile technique used. An injection of Orthovisc, series #4  was used.   Patient tolerated procedure well. Patient was advised to call if redness, pain or fever occur and apply ice for 15 minutes out of every hour for the next 12-24 hours as tolerated.     Patient has tried OTC's including aspirin, Ibuprofen, Alieve, etc or prescription NSAIDS, and/or exercises at home and/or physical therapy without satisfactory response, patient had decreased mobility in the joint and the risks benefits, and alternatives have been discussed, and verbal consent was obtained.

## 2024-05-13 NOTE — IMAGING
[de-identified] : RIGHT KNEE  Inspection:  mild effusion  Palpation: medial joint line tenderness, anterior tenderness  Knee Range of Motion:  3-125   Strength: 5/5 Quadriceps strength, 5/5 Hamstring strength  Neurological: light touch is intact throughout  Ligament Stability and Special Tests:   McMurrays: neg  Lachman: neg  Pivot Shift: neg  Posterior Drawer: neg  Valgus: neg  Varus: neg  Patella Apprehension: neg  Patella Maltracking: neg

## 2024-08-12 ENCOUNTER — APPOINTMENT (OUTPATIENT)
Dept: ORTHOPEDIC SURGERY | Facility: CLINIC | Age: 64
End: 2024-08-12

## 2024-09-04 ENCOUNTER — APPOINTMENT (OUTPATIENT)
Dept: GASTROENTEROLOGY | Facility: CLINIC | Age: 64
End: 2024-09-04
Payer: MEDICAID

## 2024-09-04 VITALS
SYSTOLIC BLOOD PRESSURE: 88 MMHG | WEIGHT: 240 LBS | BODY MASS INDEX: 40.97 KG/M2 | HEART RATE: 65 BPM | HEIGHT: 64 IN | DIASTOLIC BLOOD PRESSURE: 62 MMHG

## 2024-09-04 DIAGNOSIS — R19.7 DIARRHEA, UNSPECIFIED: ICD-10-CM

## 2024-09-04 DIAGNOSIS — R11.0 NAUSEA: ICD-10-CM

## 2024-09-04 DIAGNOSIS — K57.32 DIVERTICULITIS OF LARGE INTESTINE W/OUT PERFORATION OR ABSCESS W/OUT BLEEDING: ICD-10-CM

## 2024-09-04 DIAGNOSIS — Z85.3 PERSONAL HISTORY OF MALIGNANT NEOPLASM OF BREAST: ICD-10-CM

## 2024-09-04 PROCEDURE — 99214 OFFICE O/P EST MOD 30 MIN: CPT

## 2024-09-04 RX ORDER — ONDANSETRON 8 MG/1
8 TABLET ORAL
Qty: 30 | Refills: 0 | Status: ACTIVE | COMMUNITY
Start: 2024-09-04 | End: 1900-01-01

## 2024-09-04 RX ORDER — COLESTIPOL HYDROCHLORIDE 1 G/1
1 TABLET, FILM COATED ORAL TWICE DAILY
Qty: 20 | Refills: 3 | Status: ACTIVE | COMMUNITY
Start: 2024-09-04 | End: 1900-01-01

## 2024-09-04 NOTE — REVIEW OF SYSTEMS
[Feeling Poorly] : feeling poorly [Feeling Tired] : feeling tired [Abdominal Pain] : abdominal pain [Diarrhea] : diarrhea [Heartburn] : heartburn [Hot Flashes] : hot flashes [Negative] : Heme/Lymph

## 2024-09-04 NOTE — ASSESSMENT
[FreeTextEntry1] : 1.  Acute diarrhea x 1 week.  Differential includes infectious vs medication-induced (Nerlynx). 2.  History of polyps and family history of colon cancer (brother).  Colonoscopy in 6/2022 with serrated adenomas, tubular adenomas, left-sided diverticulosis, hemorrhoids. 3.  History of uncomplicated sigmoid diverticulitis (2022). 4.  GERD, episodic dysphagia.  EGD in 6/2022 with hiatus hernia, esophagitis, gastritis. 5.  Morbid obesity on Mounjaro. 6.  History of breast cancer (2023). 7.  DM Type 2. 8.  HTN. 9.  DJD of spine. 10.  Status post myomectomy, ovarian cystectomy. 11.  Ex-smoker.  Recs: - Prior records reviewed. - GI PCR, C.Diff ordered. - Increase loperamide to 4x day. - Ondansetron PRN nausea. - Maintain adequate hydration.  [place] diet as tolerated. - Can try colestipol if symptoms persist. - If deterioration, return to ER. - Hold Mounjaro. - Would plan to repeat colonoscopy in 1-3 years for surveillance.

## 2024-09-04 NOTE — PHYSICAL EXAM
[Sclera] : the sclera and conjunctiva were normal [Hearing Threshold Finger Rub Not Hardy] : hearing was normal [Normal Appearance] : the appearance of the neck was normal [No Respiratory Distress] : no respiratory distress [Auscultation Breath Sounds / Voice Sounds] : lungs were clear to auscultation bilaterally [Heart Rate And Rhythm] : heart rate was normal and rhythm regular [Normal S1, S2] : normal S1 and S2 [Bowel Sounds] : normal bowel sounds [Abdomen Tenderness] : non-tender [No Masses] : no abdominal mass palpated [Abdomen Soft] : soft [No Focal Deficits] : no focal deficits [Oriented To Time, Place, And Person] : oriented to person, place, and time [de-identified] : ill-appearing [de-identified] : no rebound/ guarding

## 2024-09-05 LAB — CDIFF BY PCR: NOT DETECTED

## 2024-09-06 LAB — GI PCR PANEL: NOT DETECTED

## 2024-09-17 NOTE — ED PROVIDER NOTE - CLINICAL SUMMARY MEDICAL DECISION MAKING FREE TEXT BOX
- normal exam and chest xray   - normal diet   - observe for changes in behavior, dizziness, shortness of breath, chest pain, abdominal pain and seek care in ER  
60 yo F with a PMHX of HTN, HLD, NIDDM, hemorrhoids here with c/o diffuse lower abdominal pain, cramping, severe in nature, a/w diarrhea and few episodes of BRBPR (last episode last night, had only brown diarrhea this am) first began 4 days ago, was evaluated at [facility] days ago with CT performed showing sigmoid Diverticulosis, fibroid uterus, was discharged home with bentyl and took Tylenol with no relief.  eval for perf, vs, diverticulitis.  h/h, electrolytes, urine  labs, ua, ct a/p. xray abdd.   pain meds, fluids reassess.

## 2024-09-24 ENCOUNTER — NON-APPOINTMENT (OUTPATIENT)
Age: 64
End: 2024-09-24

## 2024-09-26 ENCOUNTER — NON-APPOINTMENT (OUTPATIENT)
Age: 64
End: 2024-09-26

## 2025-06-20 NOTE — ED ADULT NURSE NOTE - NSICDXPASTMEDICALHX_GEN_ALL_CORE_FT
Progress Note      No chief complaint on file.         Subjective:stable      OBJECTIVE:  Blood pressure 111/70, pulse 77, temperature 98.2 °F (36.8 °C), temperature source Oral, resp. rate 16, height 4' 10\" (1.473 m), weight 60.2 kg (132 lb 11.5 oz), SpO2 98%.     Physical Exam  Vitals reviewed.   Constitutional:       General: She is not in acute distress.     Appearance: She is ill-appearing. She is not toxic-appearing.   HENT:      Mouth/Throat:      Mouth: Mucous membranes are moist.   Eyes:      General: No scleral icterus.     Conjunctiva/sclera: Conjunctivae normal.   Cardiovascular:      Rate and Rhythm: Normal rate and regular rhythm.      Heart sounds: Murmur heard.   Pulmonary:      Effort: No respiratory distress.      Breath sounds: No wheezing or rales.   Abdominal:      General: Abdomen is flat. Bowel sounds are normal. There is no distension.      Tenderness: There is no abdominal tenderness.   Musculoskeletal:         General: Tenderness present.      Right lower leg: No edema.      Left lower leg: No edema.      Comments: Sp THR left, known DJD right hip right knee   Skin:     Coloration: Skin is not jaundiced or pale.      Findings: No erythema or rash.   Neurological:      Mental Status: She is alert and oriented to person, place, and time.      Motor: Weakness present.      Gait: Gait abnormal.            Diagnostic Data:     CBC:   WBC (K/mcL)   Date Value   05/30/2025 5.0     HCT (%)   Date Value   06/19/2025 31.6 (L)     HGB (g/dL)   Date Value   06/19/2025 9.8 (L)     PLT (K/mcL)   Date Value   05/30/2025 266       CMP:  Glucose (mg/dL)   Date Value   05/30/2025 92     Sodium (mmol/L)   Date Value   05/30/2025 140     Potassium (mmol/L)   Date Value   05/30/2025 3.6     Chloride (mmol/L)   Date Value   05/30/2025 105     Carbon Dioxide (mmol/L)   Date Value   05/30/2025 26     BUN (mg/dL)   Date Value   05/30/2025 26 (H)     Creatinine (mg/dL)   Date Value   05/30/2025 1.19 (H)     Protein,  Total (g/dL)   Date Value   05/30/2025 6.1 (L)     Albumin (g/dL)   Date Value   05/30/2025 3.5     Calcium (mg/dL)   Date Value   05/30/2025 9.5     Bilirubin, Total (mg/dL)   Date Value   05/30/2025 0.4     GOT/AST (Units/L)   Date Value   05/30/2025 26     Alkaline Phosphatase (Units/L)   Date Value   05/30/2025 65     GPT/ALT (Units/L)   Date Value   05/30/2025 26     Anion Gap (mmol/L)   Date Value   05/30/2025 13     Globulin (g/dL)   Date Value   05/30/2025 2.6     A/G Ratio (no units)   Date Value   05/30/2025 1.3              Imaging:   XR PELVIS 1 VIEW   Final Result   1.   Expected sequela of left hip arthroplasty without immediate   complication.   2.   Severe right hip osteoarthritis.            Electronically Signed by: VENUS CARLSON MD    Signed on: 6/19/2025 11:31 AM    Workstation ID: [id number]            ASSESSMENT / PLAN:    Arthritis of left hip sp THR posterior approach, very debilitated , chronic  HTN on mult psych meds no support at home for care, mult joints severe debility increased fall risk           Meds:     Current Facility-Administered Medications   Medication Dose Route Frequency Provider Last Rate Last Admin    acetaminophen (TYLENOL) tablet 650 mg  650 mg Oral 3 times per day Martinez Aguayo MD        [START ON 6/20/2025] dexAMETHasone (DECADRON) injection 4 mg  4 mg Intravenous Once Martinez Aguayo MD        ketorolac (TORADOL) injection 15 mg  15 mg Intravenous Q6H PRN Martinez Aguayo MD   15 mg at 06/19/25 1242    HYDROcodone-acetaminophen (NORCO) 5-325 MG per tablet 1 tablet  1 tablet Oral Q4H PRN Martinez Aguayo MD   1 tablet at 06/19/25 1347    Or    HYDROcodone-acetaminophen (NORCO)  MG per tablet 1 tablet  1 tablet Oral Q4H PRN Martinez Aguayo MD   1 tablet at 06/19/25 1812    HYDROmorphone (DILAUDID) injection 0.5 mg  0.5 mg Intravenous Q4H PRN Martinez Aguayo MD        ceFAZolin (ANCEF) 2,000 mg in sterile water (preservative free) IV  syringe  2,000 mg Intravenous 3 times per day Martinez Aguayo MD   2,000 mg at 06/19/25 1701    polyethylene glycol (MIRALAX) packet 17 g  17 g Oral Daily PRN Martinez Aguayo MD        calcium carbonate (TUMS) chewable tablet 1,000 mg  1,000 mg Oral Q4H PRN Martinez Aguayo MD        chlorhexidine gluconate (PERIDEX) 0.12 % solution 15 mL  15 mL Swish & Spit 2 times per day Martinez Aguayo MD        sodium chloride 0.9 % injection 10 mL  10 mL Intravenous PRN Martinez Aguayo MD        sodium chloride 0.9 % injection 2 mL  2 mL Intracatheter 2 times per day Martinez Aguayo MD        lactated ringers infusion   Intravenous Continuous Martinez Aguayo MD        [START ON 6/20/2025] aspirin (ECOTRIN) enteric coated tablet 81 mg  81 mg Oral 2 times per day Martinez Aguayo MD        FENTANYL CITRATE 0.05 MG/ML IJ SOLN (WRAPPED) Pyxis Override             HYDROMORPHONE HCL 1 MG/ML IJ SOLN(PF & NON PF)(WRAPPED) Pyxis Override             HYDROMORPHONE HCL 1 MG/ML IJ SOLN(PF & NON PF)(WRAPPED) Pyxis Override             atomoxetine (STRATTERA) capsule 100 mg  100 mg Oral Daily Chandana Gaspar MD        gabapentin (NEURONTIN) capsule 300 mg  300 mg Oral TID Chandana Gaspar MD        [START ON 6/20/2025] levothyroxine (SYNTHROID, LEVOTHROID) tablet 150 mcg  150 mcg Oral Daily Chandana Gaspar MD        [START ON 6/20/2025] lurasidone (LATUDA) tablet 40 mg  40 mg Oral Daily with breakfast Chandana Gaspar MD        [START ON 6/20/2025] magnesium oxide (MAG-OX) tablet 400 mg  400 mg Oral Daily Chandana Gaspar MD        [START ON 6/20/2025] metoPROLOL tartrate (LOPRESSOR) tablet 37.5 mg  37.5 mg Oral QAM Chandana Gaspar MD        And    metoPROLOL tartrate (LOPRESSOR) tablet 25 mg  25 mg Oral Q Evening Chandana Gaspar MD        midodrine (PROAMATINE) tablet 5 mg  5 mg Oral BID PRN Chandana Gaspar MD        [START ON 6/20/2025] pantoprazole (PROTONIX) EC tablet 40 mg  40 mg Oral Daily Hubert  Chandana HANSEN MD        QUEtiapine (SEROquel) tablet 300 mg  300 mg Oral QHS Chandana Gaspar MD        [START ON 6/20/2025] tamsulosin (FLOMAX) capsule 0.4 mg  0.4 mg Oral Daily with breakfast Chandana Gaspar MD        vortioxetine (TRINTELLIX) tablet 10 mg  10 mg Oral Daily Chandana Gaspar MD        [START ON 6/20/2025] vortioxetine (TRINTELLIX) tablet 20 mg  20 mg Oral Daily Chandana Gaspar MD        nystatin (MYCOSTATIN) cream 1 Application  1 Application Topical BID PRN Chandana Gaspar MD                Prophylaxis:   DVT with ASA      Total Time spent with patient and coordinating care:  35 minutes    Thank You,  Chandana Gaspar MD   PAST MEDICAL HISTORY:  Asthma     Diabetes     HLD (hyperlipidemia)     HTN (hypertension)

## (undated) DEVICE — LINE BREATHE SAMPLNG

## (undated) DEVICE — DRSG CURITY GAUZE SPONGE 4 X 4" 12-PLY NON-STERILE

## (undated) DEVICE — TUBING SUCTION NONCONDUCTIVE 6MM X 12FT

## (undated) DEVICE — GOWN LG

## (undated) DEVICE — BASIN EMESIS 10IN GRADUATED MAUVE

## (undated) DEVICE — SALIVA EJECTOR (BLUE)

## (undated) DEVICE — BIOPSY FORCEP RADIAL JAW 4 STANDARD WITH NEEDLE

## (undated) DEVICE — TUBING MEDI-VAC W MAXIGRIP CONNECTORS 1/4"X6'

## (undated) DEVICE — ELCTR GROUNDING PAD ADULT COVIDIEN

## (undated) DEVICE — CLAMP BX HOT RAD JAW 3

## (undated) DEVICE — DENTURE CUP PINK

## (undated) DEVICE — SNARE EXACTO COLD 9MMX230CM

## (undated) DEVICE — UNDERPAD LINEN SAVER 17 X 24"

## (undated) DEVICE — POLY TRAP ETRAP

## (undated) DEVICE — CATH IV SAFE BC 22G X 1" (BLUE)

## (undated) DEVICE — LUBRICATING JELLY HR ONE SHOT 3G

## (undated) DEVICE — FACESHIELD FULL VISOR

## (undated) DEVICE — PACK IV START WITH CHG

## (undated) DEVICE — BITE BLOCK ADULT 20 X 27MM (GREEN)

## (undated) DEVICE — ELCTR ECG CONDUCTIVE ADHESIVE

## (undated) DEVICE — TUBING IV SET GRAVITY 3Y 100" MACRO

## (undated) DEVICE — DRSG 2X2

## (undated) DEVICE — DRSG BANDAID 0.75X3"

## (undated) DEVICE — BIOPSY FORCEP COLD DISP

## (undated) DEVICE — CONTAINER FORMALIN 80ML YELLOW